# Patient Record
Sex: MALE | Race: WHITE | NOT HISPANIC OR LATINO | Employment: OTHER | URBAN - NONMETROPOLITAN AREA
[De-identification: names, ages, dates, MRNs, and addresses within clinical notes are randomized per-mention and may not be internally consistent; named-entity substitution may affect disease eponyms.]

---

## 2017-01-26 RX ORDER — PRAVASTATIN SODIUM 20 MG
TABLET ORAL
Qty: 30 TABLET | Refills: 5 | Status: SHIPPED | OUTPATIENT
Start: 2017-01-26 | End: 2017-08-22 | Stop reason: SDUPTHER

## 2017-02-23 RX ORDER — AMLODIPINE BESYLATE 5 MG/1
TABLET ORAL
Qty: 30 TABLET | Refills: 6 | Status: SHIPPED | OUTPATIENT
Start: 2017-02-23 | End: 2017-06-06

## 2017-02-23 RX ORDER — LOSARTAN POTASSIUM AND HYDROCHLOROTHIAZIDE 12.5; 1 MG/1; MG/1
TABLET ORAL
Qty: 30 TABLET | Refills: 4 | Status: SHIPPED | OUTPATIENT
Start: 2017-02-23 | End: 2017-07-05

## 2017-03-07 RX ORDER — MONTELUKAST SODIUM 10 MG/1
10 TABLET ORAL NIGHTLY
Qty: 30 TABLET | Refills: 2 | Status: SHIPPED | OUTPATIENT
Start: 2017-03-07 | End: 2017-05-22 | Stop reason: SDUPTHER

## 2017-05-22 ENCOUNTER — OFFICE VISIT (OUTPATIENT)
Dept: FAMILY MEDICINE CLINIC | Facility: CLINIC | Age: 62
End: 2017-05-22

## 2017-05-22 VITALS
RESPIRATION RATE: 16 BRPM | BODY MASS INDEX: 23.69 KG/M2 | OXYGEN SATURATION: 94 % | WEIGHT: 169.2 LBS | SYSTOLIC BLOOD PRESSURE: 136 MMHG | HEIGHT: 71 IN | HEART RATE: 80 BPM | DIASTOLIC BLOOD PRESSURE: 84 MMHG | TEMPERATURE: 98.2 F

## 2017-05-22 DIAGNOSIS — J44.9 CHRONIC OBSTRUCTIVE PULMONARY DISEASE, UNSPECIFIED COPD TYPE (HCC): Primary | ICD-10-CM

## 2017-05-22 DIAGNOSIS — J60 COALWORKER'S PNEUMOCONIOSIS (HCC): ICD-10-CM

## 2017-05-22 DIAGNOSIS — I10 ESSENTIAL HYPERTENSION: ICD-10-CM

## 2017-05-22 DIAGNOSIS — E78.2 MIXED HYPERLIPIDEMIA: ICD-10-CM

## 2017-05-22 DIAGNOSIS — I49.9 IRREGULAR HEART BEATS: ICD-10-CM

## 2017-05-22 DIAGNOSIS — R53.83 FATIGUE, UNSPECIFIED TYPE: ICD-10-CM

## 2017-05-22 PROCEDURE — 99214 OFFICE O/P EST MOD 30 MIN: CPT | Performed by: NURSE PRACTITIONER

## 2017-05-22 PROCEDURE — 93000 ELECTROCARDIOGRAM COMPLETE: CPT | Performed by: NURSE PRACTITIONER

## 2017-05-22 RX ORDER — BUDESONIDE AND FORMOTEROL FUMARATE DIHYDRATE 160; 4.5 UG/1; UG/1
2 AEROSOL RESPIRATORY (INHALATION) 2 TIMES DAILY
Qty: 1 INHALER | Refills: 5 | Status: SHIPPED | OUTPATIENT
Start: 2017-05-22 | End: 2019-11-15 | Stop reason: ALTCHOICE

## 2017-05-22 RX ORDER — MONTELUKAST SODIUM 10 MG/1
10 TABLET ORAL NIGHTLY
COMMUNITY
End: 2017-05-22 | Stop reason: SDUPTHER

## 2017-05-22 RX ORDER — MONTELUKAST SODIUM 10 MG/1
10 TABLET ORAL NIGHTLY
Qty: 30 TABLET | Refills: 5 | Status: SHIPPED | OUTPATIENT
Start: 2017-05-22 | End: 2017-10-24 | Stop reason: SDUPTHER

## 2017-05-22 RX ORDER — ALBUTEROL SULFATE 90 UG/1
1-2 AEROSOL, METERED RESPIRATORY (INHALATION) EVERY 4 HOURS PRN
Qty: 1 INHALER | Refills: 5 | Status: SHIPPED | OUTPATIENT
Start: 2017-05-22 | End: 2017-06-06 | Stop reason: SDUPTHER

## 2017-05-24 ENCOUNTER — LAB (OUTPATIENT)
Dept: LAB | Facility: OTHER | Age: 62
End: 2017-05-24

## 2017-05-24 DIAGNOSIS — E78.2 MIXED HYPERLIPIDEMIA: ICD-10-CM

## 2017-05-24 DIAGNOSIS — I49.9 IRREGULAR HEART BEATS: ICD-10-CM

## 2017-05-24 DIAGNOSIS — R53.83 FATIGUE, UNSPECIFIED TYPE: ICD-10-CM

## 2017-05-24 DIAGNOSIS — I10 ESSENTIAL HYPERTENSION: ICD-10-CM

## 2017-05-24 LAB
ALBUMIN SERPL-MCNC: 4.3 G/DL (ref 3.2–5.5)
ALBUMIN/GLOB SERPL: 1.3 G/DL (ref 1–3)
ALP SERPL-CCNC: 57 U/L (ref 15–121)
ALT SERPL W P-5'-P-CCNC: 32 U/L (ref 10–60)
ANION GAP SERPL CALCULATED.3IONS-SCNC: 11 MMOL/L (ref 5–15)
AST SERPL-CCNC: 33 U/L (ref 10–60)
BASOPHILS # BLD AUTO: 0.03 10*3/MM3 (ref 0–0.2)
BASOPHILS NFR BLD AUTO: 0.5 % (ref 0–2)
BILIRUB SERPL-MCNC: 0.7 MG/DL (ref 0.2–1)
BUN BLD-MCNC: 9 MG/DL (ref 8–25)
BUN/CREAT SERPL: 10 (ref 7–25)
CALCIUM SPEC-SCNC: 9.9 MG/DL (ref 8.4–10.8)
CHLORIDE SERPL-SCNC: 97 MMOL/L (ref 100–112)
CHOLEST SERPL-MCNC: 182 MG/DL (ref 150–200)
CO2 SERPL-SCNC: 29 MMOL/L (ref 20–32)
CREAT BLD-MCNC: 0.9 MG/DL (ref 0.4–1.3)
DEPRECATED RDW RBC AUTO: 45.1 FL (ref 35.1–43.9)
EOSINOPHIL # BLD AUTO: 0.43 10*3/MM3 (ref 0–0.7)
EOSINOPHIL NFR BLD AUTO: 6.5 % (ref 0–7)
ERYTHROCYTE [DISTWIDTH] IN BLOOD BY AUTOMATED COUNT: 13.6 % (ref 11.5–14.5)
GFR SERPL CREATININE-BSD FRML MDRD: 86 ML/MIN/1.73 (ref 49–113)
GLOBULIN UR ELPH-MCNC: 3.3 GM/DL (ref 2.5–4.6)
GLUCOSE BLD-MCNC: 120 MG/DL (ref 70–100)
HCT VFR BLD AUTO: 50.6 % (ref 39–49)
HDLC SERPL-MCNC: 91 MG/DL (ref 35–100)
HGB BLD-MCNC: 17.6 G/DL (ref 13.7–17.3)
LDLC SERPL CALC-MCNC: 82 MG/DL
LDLC/HDLC SERPL: 0.9 {RATIO}
LYMPHOCYTES # BLD AUTO: 1.42 10*3/MM3 (ref 0.6–4.2)
LYMPHOCYTES NFR BLD AUTO: 21.4 % (ref 10–50)
MAGNESIUM SERPL-MCNC: 2.1 MG/DL (ref 1.8–2.5)
MCH RBC QN AUTO: 32.5 PG (ref 26.5–34)
MCHC RBC AUTO-ENTMCNC: 34.8 G/DL (ref 31.5–36.3)
MCV RBC AUTO: 93.4 FL (ref 80–98)
MONOCYTES # BLD AUTO: 0.99 10*3/MM3 (ref 0–0.9)
MONOCYTES NFR BLD AUTO: 14.9 % (ref 0–12)
NEUTROPHILS # BLD AUTO: 3.78 10*3/MM3 (ref 2–8.6)
NEUTROPHILS NFR BLD AUTO: 56.7 % (ref 37–80)
PLATELET # BLD AUTO: 343 10*3/MM3 (ref 150–450)
PMV BLD AUTO: 9.6 FL (ref 8–12)
POTASSIUM BLD-SCNC: 5.6 MMOL/L (ref 3.4–5.4)
PROT SERPL-MCNC: 7.6 G/DL (ref 6.7–8.2)
RBC # BLD AUTO: 5.42 10*6/MM3 (ref 4.37–5.74)
SODIUM BLD-SCNC: 137 MMOL/L (ref 134–146)
TRIGL SERPL-MCNC: 47 MG/DL (ref 35–160)
VLDLC SERPL-MCNC: 9.4 MG/DL
WBC NRBC COR # BLD: 6.65 10*3/MM3 (ref 3.2–9.8)

## 2017-05-24 PROCEDURE — 84443 ASSAY THYROID STIM HORMONE: CPT | Performed by: NURSE PRACTITIONER

## 2017-05-24 PROCEDURE — 85025 COMPLETE CBC W/AUTO DIFF WBC: CPT | Performed by: NURSE PRACTITIONER

## 2017-05-24 PROCEDURE — 36415 COLL VENOUS BLD VENIPUNCTURE: CPT | Performed by: NURSE PRACTITIONER

## 2017-05-24 PROCEDURE — 80061 LIPID PANEL: CPT | Performed by: NURSE PRACTITIONER

## 2017-05-24 PROCEDURE — 80053 COMPREHEN METABOLIC PANEL: CPT | Performed by: NURSE PRACTITIONER

## 2017-05-24 PROCEDURE — 83735 ASSAY OF MAGNESIUM: CPT | Performed by: NURSE PRACTITIONER

## 2017-05-24 PROCEDURE — 84439 ASSAY OF FREE THYROXINE: CPT | Performed by: NURSE PRACTITIONER

## 2017-05-25 LAB
T4 FREE SERPL-MCNC: 1.54 NG/DL (ref 0.78–2.19)
TSH SERPL DL<=0.05 MIU/L-ACNC: 0.57 MIU/ML (ref 0.46–4.68)

## 2017-05-26 DIAGNOSIS — E87.5 HYPERKALEMIA: Primary | ICD-10-CM

## 2017-06-02 LAB
ANION GAP SERPL CALCULATED.3IONS-SCNC: 12 MMOL/L (ref 5–15)
BASOPHILS # BLD AUTO: 0.03 10*3/MM3 (ref 0–0.2)
BASOPHILS NFR BLD AUTO: 0.4 % (ref 0–2)
BUN BLD-MCNC: 10 MG/DL (ref 8–25)
BUN/CREAT SERPL: 11.1 (ref 7–25)
CALCIUM SPEC-SCNC: 9.2 MG/DL (ref 8.4–10.8)
CHLORIDE SERPL-SCNC: 98 MMOL/L (ref 100–112)
CO2 SERPL-SCNC: 26 MMOL/L (ref 20–32)
CREAT BLD-MCNC: 0.9 MG/DL (ref 0.4–1.3)
DEPRECATED RDW RBC AUTO: 45.2 FL (ref 35.1–43.9)
EOSINOPHIL # BLD AUTO: 0.4 10*3/MM3 (ref 0–0.7)
EOSINOPHIL NFR BLD AUTO: 5.3 % (ref 0–7)
ERYTHROCYTE [DISTWIDTH] IN BLOOD BY AUTOMATED COUNT: 13.6 % (ref 11.5–14.5)
GFR SERPL CREATININE-BSD FRML MDRD: 86 ML/MIN/1.73 (ref 49–113)
GLUCOSE BLD-MCNC: 121 MG/DL (ref 70–100)
HCT VFR BLD AUTO: 49.8 % (ref 39–49)
HGB BLD-MCNC: 17.3 G/DL (ref 13.7–17.3)
LYMPHOCYTES # BLD AUTO: 1.16 10*3/MM3 (ref 0.6–4.2)
LYMPHOCYTES NFR BLD AUTO: 15.3 % (ref 10–50)
MCH RBC QN AUTO: 32.3 PG (ref 26.5–34)
MCHC RBC AUTO-ENTMCNC: 34.7 G/DL (ref 31.5–36.3)
MCV RBC AUTO: 92.9 FL (ref 80–98)
MONOCYTES # BLD AUTO: 0.75 10*3/MM3 (ref 0–0.9)
MONOCYTES NFR BLD AUTO: 9.9 % (ref 0–12)
NEUTROPHILS # BLD AUTO: 5.24 10*3/MM3 (ref 2–8.6)
NEUTROPHILS NFR BLD AUTO: 69.1 % (ref 37–80)
PLATELET # BLD AUTO: 300 10*3/MM3 (ref 150–450)
PMV BLD AUTO: 9.3 FL (ref 8–12)
POTASSIUM BLD-SCNC: 4.4 MMOL/L (ref 3.4–5.4)
RBC # BLD AUTO: 5.36 10*6/MM3 (ref 4.37–5.74)
SODIUM BLD-SCNC: 136 MMOL/L (ref 134–146)
WBC NRBC COR # BLD: 7.58 10*3/MM3 (ref 3.2–9.8)

## 2017-06-02 PROCEDURE — 85025 COMPLETE CBC W/AUTO DIFF WBC: CPT | Performed by: NURSE PRACTITIONER

## 2017-06-02 PROCEDURE — 36415 COLL VENOUS BLD VENIPUNCTURE: CPT | Performed by: NURSE PRACTITIONER

## 2017-06-02 PROCEDURE — 80048 BASIC METABOLIC PNL TOTAL CA: CPT | Performed by: NURSE PRACTITIONER

## 2017-06-06 ENCOUNTER — OFFICE VISIT (OUTPATIENT)
Dept: FAMILY MEDICINE CLINIC | Facility: CLINIC | Age: 62
End: 2017-06-06

## 2017-06-06 VITALS
WEIGHT: 168 LBS | HEIGHT: 69 IN | DIASTOLIC BLOOD PRESSURE: 82 MMHG | BODY MASS INDEX: 24.88 KG/M2 | TEMPERATURE: 98.8 F | HEART RATE: 76 BPM | SYSTOLIC BLOOD PRESSURE: 152 MMHG

## 2017-06-06 DIAGNOSIS — D75.1 POLYCYTHEMIA: ICD-10-CM

## 2017-06-06 DIAGNOSIS — J44.9 CHRONIC OBSTRUCTIVE PULMONARY DISEASE, UNSPECIFIED COPD TYPE (HCC): ICD-10-CM

## 2017-06-06 DIAGNOSIS — E87.5 HYPERKALEMIA: ICD-10-CM

## 2017-06-06 DIAGNOSIS — I10 ESSENTIAL HYPERTENSION: Primary | ICD-10-CM

## 2017-06-06 PROCEDURE — 99214 OFFICE O/P EST MOD 30 MIN: CPT | Performed by: NURSE PRACTITIONER

## 2017-06-06 RX ORDER — AMLODIPINE BESYLATE 10 MG/1
10 TABLET ORAL DAILY
Qty: 30 TABLET | Refills: 5 | Status: SHIPPED | OUTPATIENT
Start: 2017-06-06 | End: 2017-07-05 | Stop reason: SDUPTHER

## 2017-06-06 RX ORDER — ALBUTEROL SULFATE 90 UG/1
1-2 AEROSOL, METERED RESPIRATORY (INHALATION) EVERY 4 HOURS PRN
Qty: 1 INHALER | Refills: 5 | Status: SHIPPED | OUTPATIENT
Start: 2017-06-06 | End: 2017-06-12 | Stop reason: CLARIF

## 2017-06-06 RX ORDER — ASPIRIN 81 MG/1
81 TABLET, CHEWABLE ORAL DAILY
COMMUNITY

## 2017-06-06 NOTE — PROGRESS NOTES
Subjective   Derrell Bynum is a 62 y.o. male.     Chief Complaint   Patient presents with   • Follow-up     lab results       History of Present Illness     Here to f/u on elevated potassium noted on recent labs.  His potassium had returned to normal on recheck on June 2 after stopping the losartan. His blood pressure is higher today due to not taking the medication.  He has polycythemia, that is stable, but was probably being aggravated by the HCTZ combined with the losartan. He has been noticing hand cramping, but this has improved since stopping the medication.    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  Proventil inhaler for COPD was not affordable.  He requests ventolin.  He continues to smoke and is not ready for smoking cessation despite encouragement.      The following portions of the patient's history were reviewed and updated as appropriate: allergies, past family history, past medical history, past social history, past surgical history and problem list.  Current Outpatient Prescriptions on File Prior to Visit   Medication Sig Dispense Refill   • albuterol (PROVENTIL) (2.5 MG/3ML) 0.083% nebulizer solution Take 2.5 mg by nebulization Every 4 (Four) Hours As Needed for wheezing (Neb tx q6hrs prn cough, congestion, wheezing).     • budesonide-formoterol (SYMBICORT) 160-4.5 MCG/ACT inhaler Inhale 2 puffs 2 (Two) Times a Day. 1 inhaler 5   • fluticasone (FLONASE) 50 MCG/ACT nasal spray 1 spray into each nostril 2 (Two) Times a Day.     • loratadine (CLARITIN) 10 MG tablet Take 10 mg by mouth Daily.     • montelukast (SINGULAIR) 10 MG tablet Take 1 tablet by mouth Every Night. 30 tablet 5   • pravastatin (PRAVACHOL) 20 MG tablet 1 TABLET(S) BY MOUTH AT BEDTIME 30 tablet 5   • [DISCONTINUED] albuterol (PROVENTIL HFA;VENTOLIN HFA) 108 (90 BASE) MCG/ACT inhaler Inhale 1-2 puffs Every 4 (Four) Hours As Needed for Wheezing or Shortness of Air. 1 inhaler 5  "  • [DISCONTINUED] amLODIPine (NORVASC) 5 MG tablet TAKE ONE TABLET BY MOUTH DAILY WITH LISINOPRIL FOR BLOOD PRESSURE 30 tablet 6   • losartan-hydrochlorothiazide (HYZAAR) 100-12.5 MG per tablet TAKE 1 TABLET BY MOUTH EVERY DAY 30 tablet 4   • tiotropium (SPIRIVA) 18 MCG per inhalation capsule Place 1 capsule into inhaler and inhale Daily.       No current facility-administered medications on file prior to visit.      Review of Systems   Constitutional: Negative for activity change, chills, fatigue and fever.   HENT: Negative for congestion, rhinorrhea, sinus pressure and sore throat.    Eyes: Negative for pain, discharge, itching and visual disturbance.   Respiratory: Positive for cough, shortness of breath and wheezing.         Chronic cough from COPD   Cardiovascular: Negative for chest pain, palpitations and leg swelling.   Gastrointestinal: Negative for abdominal pain, blood in stool, constipation, diarrhea, nausea and vomiting.   Endocrine: Negative for polydipsia and polyuria.   Genitourinary: Negative for dysuria, flank pain, frequency, hematuria and urgency.   Musculoskeletal: Positive for arthralgias. Negative for back pain, gait problem and myalgias.        Chronic arthritis   Skin: Negative for rash.   Neurological: Negative for dizziness, tremors, seizures, syncope, weakness and headaches.   Hematological: Negative for adenopathy. Does not bruise/bleed easily.   Psychiatric/Behavioral: Negative for confusion, dysphoric mood, sleep disturbance and suicidal ideas. The patient is not nervous/anxious.        Objective    Vitals:    06/06/17 0818   BP: 152/82   Pulse: 76   Temp: 98.8 °F (37.1 °C)   Weight: 168 lb (76.2 kg)   Height: 69\" (175.3 cm)   PainSc: 0-No pain     Physical Exam   Constitutional: He is oriented to person, place, and time. He appears well-developed and well-nourished. No distress.   HENT:   Head: Normocephalic.   Right Ear: External ear normal.   Left Ear: External ear normal.   Nose: " Nose normal.   Mouth/Throat: Oropharynx is clear and moist.   TMs normal.    Eyes: Conjunctivae are normal. Pupils are equal, round, and reactive to light. Right eye exhibits no discharge. Left eye exhibits no discharge. No scleral icterus.   Neck: Neck supple. No thyromegaly present.   No cervical, occipital or supraclavicular adenopathy.    Cardiovascular: Normal rate and normal heart sounds.    No murmur heard.  HR is irregular.   No dependent edema.    Pulmonary/Chest: Effort normal. No respiratory distress. He has wheezes. He has no rales.   Diminished breath sounds to bases as usual for this patient.    Genitourinary:   Genitourinary Comments: No CVAT.    Musculoskeletal: He exhibits no edema or deformity.   Lymphadenopathy:     He has no cervical adenopathy.   Neurological: He is alert and oriented to person, place, and time. No cranial nerve deficit. Coordination normal.   No balance disturbance observed.    Skin: Skin is warm and dry. No rash noted. He is not diaphoretic. No pallor.   Psychiatric: He has a normal mood and affect. His behavior is normal.   Nursing note and vitals reviewed.      Assessment/Plan   Problems Addressed this Visit        Cardiovascular and Mediastinum    Essential hypertension - Primary    Relevant Medications    amLODIPine (NORVASC) 10 MG tablet       Respiratory    COPD (chronic obstructive pulmonary disease)    Relevant Medications    Fluticasone Furoate-Vilanterol (BREO ELLIPTA) 100-25 MCG/INH aerosol powder     albuterol (PROVENTIL HFA;VENTOLIN HFA) 108 (90 BASE) MCG/ACT inhaler       Hematopoietic and Hemostatic    Polycythemia       Other    Hyperkalemia          Continue amlodipine and other medications.  Will increase the amlodipine to 10 mg daily.  Counseled potential medication side effects and benefits. Instructed to report side effects. Report if symptoms worsen or persist. Understanding expressed.   follow-up 1 month and sooner if needed.

## 2017-07-05 ENCOUNTER — OFFICE VISIT (OUTPATIENT)
Dept: FAMILY MEDICINE CLINIC | Facility: CLINIC | Age: 62
End: 2017-07-05

## 2017-07-05 VITALS
SYSTOLIC BLOOD PRESSURE: 152 MMHG | HEIGHT: 69 IN | WEIGHT: 167 LBS | HEART RATE: 80 BPM | DIASTOLIC BLOOD PRESSURE: 88 MMHG | BODY MASS INDEX: 24.73 KG/M2

## 2017-07-05 DIAGNOSIS — I10 ESSENTIAL HYPERTENSION: Primary | ICD-10-CM

## 2017-07-05 DIAGNOSIS — J44.9 CHRONIC OBSTRUCTIVE PULMONARY DISEASE, UNSPECIFIED COPD TYPE (HCC): ICD-10-CM

## 2017-07-05 DIAGNOSIS — G89.29 CHRONIC LEFT-SIDED LOW BACK PAIN WITHOUT SCIATICA: ICD-10-CM

## 2017-07-05 DIAGNOSIS — M54.50 CHRONIC LEFT-SIDED LOW BACK PAIN WITHOUT SCIATICA: ICD-10-CM

## 2017-07-05 PROCEDURE — 99214 OFFICE O/P EST MOD 30 MIN: CPT | Performed by: NURSE PRACTITIONER

## 2017-07-05 RX ORDER — AMLODIPINE BESYLATE 10 MG/1
10 TABLET ORAL DAILY
Qty: 30 TABLET | Refills: 5 | Status: SHIPPED | OUTPATIENT
Start: 2017-07-05 | End: 2017-10-24 | Stop reason: SDUPTHER

## 2017-07-05 RX ORDER — CLONIDINE HYDROCHLORIDE 0.1 MG/1
0.1 TABLET ORAL 2 TIMES DAILY
Qty: 60 TABLET | Refills: 5 | Status: SHIPPED | OUTPATIENT
Start: 2017-07-05 | End: 2018-11-29 | Stop reason: SDUPTHER

## 2017-07-05 RX ORDER — HYDROCODONE BITARTRATE AND ACETAMINOPHEN 7.5; 325 MG/1; MG/1
TABLET ORAL
Qty: 60 TABLET | Refills: 0 | Status: SHIPPED | OUTPATIENT
Start: 2017-07-05 | End: 2017-07-27 | Stop reason: SDUPTHER

## 2017-07-05 NOTE — PROGRESS NOTES
Subjective   Derrell Bynum is a 62 y.o. male.     Chief Complaint   Patient presents with   • Follow-up       History of Present Illness     Here to f/u on HTN after increasing amlodpine.  He had elevated potasisum with losartan but this returned to normal.   His B/P still runs high sometimes. Upon recheck today it is 150/82. He is having a lot of low back pain.     He has polycythemia, that is stable. This was likely aggravated by the HCTZ and his smoking.      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  Symptoms improved  with his current inhalers but he does have daily dyspnea with activity. He continues to smoke and is not ready for smoking cessation despite encouragement.      The following portions of the patient's history were reviewed and updated as appropriate: allergies, past family history, past medical history, past social history, past surgical history and problem list.  Current Outpatient Prescriptions on File Prior to Visit   Medication Sig Dispense Refill   • albuterol (PROAIR RESPICLICK) 108 (90 BASE) MCG/ACT inhaler Inhale 2 puffs Every 4 (Four) Hours As Needed for Wheezing. 1 inhaler 11   • albuterol (PROVENTIL) (2.5 MG/3ML) 0.083% nebulizer solution Take 2.5 mg by nebulization Every 4 (Four) Hours As Needed for wheezing (Neb tx q6hrs prn cough, congestion, wheezing).     • aspirin 81 MG chewable tablet Chew 81 mg Daily.     • fluticasone (FLONASE) 50 MCG/ACT nasal spray 1 spray into each nostril 2 (Two) Times a Day.     • Fluticasone Furoate-Vilanterol (BREO ELLIPTA) 100-25 MCG/INH aerosol powder  Inhale 1 puff Daily.     • loratadine (CLARITIN) 10 MG tablet Take 10 mg by mouth Daily.     • montelukast (SINGULAIR) 10 MG tablet Take 1 tablet by mouth Every Night. 30 tablet 5   • pravastatin (PRAVACHOL) 20 MG tablet 1 TABLET(S) BY MOUTH AT BEDTIME 30 tablet 5   • [DISCONTINUED] amLODIPine (NORVASC) 10 MG tablet Take 1 tablet by mouth Daily. 30  "tablet 5   • budesonide-formoterol (SYMBICORT) 160-4.5 MCG/ACT inhaler Inhale 2 puffs 2 (Two) Times a Day. 1 inhaler 5   • tiotropium (SPIRIVA) 18 MCG per inhalation capsule Place 1 capsule into inhaler and inhale Daily.     • [DISCONTINUED] losartan-hydrochlorothiazide (HYZAAR) 100-12.5 MG per tablet TAKE 1 TABLET BY MOUTH EVERY DAY 30 tablet 4     No current facility-administered medications on file prior to visit.      Review of Systems   Constitutional: Negative for activity change, chills, fatigue and fever.   HENT: Negative for congestion, rhinorrhea, sinus pressure and sore throat.    Eyes: Negative for pain, discharge, itching and visual disturbance.   Respiratory: Positive for cough, shortness of breath and wheezing.         Chronic cough from COPD   Cardiovascular: Negative for chest pain, palpitations and leg swelling.   Gastrointestinal: Negative for abdominal pain, blood in stool, constipation, diarrhea, nausea and vomiting.   Endocrine: Negative for polydipsia and polyuria.   Genitourinary: Negative for dysuria, flank pain, frequency, hematuria and urgency.   Musculoskeletal: Positive for arthralgias and back pain. Negative for gait problem and myalgias.        Chronic arthritis   Skin: Negative for rash.   Neurological: Negative for dizziness, tremors, seizures, syncope, weakness and headaches.   Hematological: Negative for adenopathy. Does not bruise/bleed easily.   Psychiatric/Behavioral: Negative for confusion, dysphoric mood, sleep disturbance and suicidal ideas. The patient is not nervous/anxious.        Objective    Vitals:    07/05/17 0809   BP: 152/88   Pulse: 80   Weight: 167 lb (75.8 kg)   Height: 69\" (175.3 cm)   PainSc: 10-Worst pain ever   PainLoc: Back     Physical Exam   Constitutional: He is oriented to person, place, and time. He appears well-developed and well-nourished. No distress.   HENT:   Head: Normocephalic.   Right Ear: External ear normal.   Left Ear: External ear normal. "   Nose: Nose normal.   Mouth/Throat: Oropharynx is clear and moist.   TMs normal.    Eyes: Conjunctivae are normal. Pupils are equal, round, and reactive to light. Right eye exhibits no discharge. Left eye exhibits no discharge. No scleral icterus.   Neck: Neck supple. No thyromegaly present.   No cervical, occipital or supraclavicular adenopathy.    Cardiovascular: Normal rate and normal heart sounds.    No murmur heard.  No dependent edema.    Pulmonary/Chest: Effort normal. No respiratory distress. He has wheezes. He has no rales.   Diminished breath sounds to bases as usual for this patient.    Genitourinary:   Genitourinary Comments: No CVAT.    Musculoskeletal: He exhibits tenderness. He exhibits no edema or deformity.   Mild kyphosis and lumbar tenderness.    Lymphadenopathy:     He has no cervical adenopathy.   Neurological: He is alert and oriented to person, place, and time. No cranial nerve deficit. Coordination normal.   No balance disturbance observed.    Skin: Skin is warm and dry. No rash noted. He is not diaphoretic. No pallor.   Psychiatric: He has a normal mood and affect. His behavior is normal.   Nursing note and vitals reviewed.      Assessment/Plan   Problems Addressed this Visit        Cardiovascular and Mediastinum    Essential hypertension    Relevant Medications    amLODIPine (NORVASC) 10 MG tablet    CloNIDine (CATAPRES) 0.1 MG tablet       Respiratory    COPD (chronic obstructive pulmonary disease)      Other Visit Diagnoses     Chronic left-sided low back pain without sciatica    -  Primary    Relevant Medications    HYDROcodone-acetaminophen (LORTAB) 7.5-325 MG per tablet    Other Relevant Orders    XR Spine Lumbar AP & Lateral          Continue current medications.  We will add Lortab when necessary for pain and he will take this only when needed.  Counseled potential medication side effects and benefits. Instructed to report side effects. Report if symptoms worsen or persist.  Understanding expressed.   follow-up 1 month and sooner if needed.

## 2017-07-27 ENCOUNTER — OFFICE VISIT (OUTPATIENT)
Dept: FAMILY MEDICINE CLINIC | Facility: CLINIC | Age: 62
End: 2017-07-27

## 2017-07-27 VITALS
DIASTOLIC BLOOD PRESSURE: 78 MMHG | BODY MASS INDEX: 25.03 KG/M2 | SYSTOLIC BLOOD PRESSURE: 114 MMHG | WEIGHT: 169 LBS | HEIGHT: 69 IN | HEART RATE: 78 BPM

## 2017-07-27 DIAGNOSIS — J44.9 CHRONIC OBSTRUCTIVE PULMONARY DISEASE, UNSPECIFIED COPD TYPE (HCC): Primary | ICD-10-CM

## 2017-07-27 DIAGNOSIS — I10 ESSENTIAL HYPERTENSION: ICD-10-CM

## 2017-07-27 DIAGNOSIS — M54.50 CHRONIC LEFT-SIDED LOW BACK PAIN WITHOUT SCIATICA: ICD-10-CM

## 2017-07-27 DIAGNOSIS — J60 COALWORKER'S PNEUMOCONIOSIS (HCC): ICD-10-CM

## 2017-07-27 DIAGNOSIS — G89.29 CHRONIC LEFT-SIDED LOW BACK PAIN WITHOUT SCIATICA: ICD-10-CM

## 2017-07-27 PROCEDURE — 99214 OFFICE O/P EST MOD 30 MIN: CPT | Performed by: NURSE PRACTITIONER

## 2017-07-27 RX ORDER — HYDROCODONE BITARTRATE AND ACETAMINOPHEN 7.5; 325 MG/1; MG/1
TABLET ORAL
Qty: 60 TABLET | Refills: 0 | Status: SHIPPED | OUTPATIENT
Start: 2017-07-27 | End: 2017-08-25 | Stop reason: SDUPTHER

## 2017-07-27 NOTE — PROGRESS NOTES
Subjective   Derrell Bynum is a 62 y.o. male.     Chief Complaint   Patient presents with   • Follow-up     x rays/back       History of Present Illness     Here to f/u on chronic back pain and arthritis. Lumbar x-ray last month revealed OA and DDD.   Lortab has helped his pain and his B/P imporoved with the pain relief and recent addition of amlodipine.  He denies medication side effects.    He has COPD and pneumoconiosis, which as been present for several years.  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  Symptoms have improved with his current inhalers but he does have daily dyspnea with activity.  He continues to smoke and is not ready for smoking cessation despite encouragement.  He uses home oxygen, especially at night but states he may have to let this go back to the DME company due to insurance not covering well.    The following portions of the patient's history were reviewed and updated as appropriate: allergies, past family history, past medical history, past social history, past surgical history and problem list.  Current Outpatient Prescriptions on File Prior to Visit   Medication Sig Dispense Refill   • albuterol (PROAIR RESPICLICK) 108 (90 BASE) MCG/ACT inhaler Inhale 2 puffs Every 4 (Four) Hours As Needed for Wheezing. 1 inhaler 11   • albuterol (PROVENTIL) (2.5 MG/3ML) 0.083% nebulizer solution Take 2.5 mg by nebulization Every 4 (Four) Hours As Needed for wheezing (Neb tx q6hrs prn cough, congestion, wheezing).     • amLODIPine (NORVASC) 10 MG tablet Take 1 tablet by mouth Daily. 30 tablet 5   • aspirin 81 MG chewable tablet Chew 81 mg Daily.     • budesonide-formoterol (SYMBICORT) 160-4.5 MCG/ACT inhaler Inhale 2 puffs 2 (Two) Times a Day. 1 inhaler 5   • CloNIDine (CATAPRES) 0.1 MG tablet Take 1 tablet by mouth 2 (Two) Times a Day. If needed for B/P 150/90 or higher. 60 tablet 5   • Fluticasone Furoate-Vilanterol (BREO ELLIPTA) 100-25 MCG/INH aerosol powder  Inhale 1 puff Daily.    "  • loratadine (CLARITIN) 10 MG tablet Take 10 mg by mouth Daily.     • montelukast (SINGULAIR) 10 MG tablet Take 1 tablet by mouth Every Night. 30 tablet 5   • pravastatin (PRAVACHOL) 20 MG tablet 1 TABLET(S) BY MOUTH AT BEDTIME 30 tablet 5   • tiotropium (SPIRIVA) 18 MCG per inhalation capsule Place 1 capsule into inhaler and inhale Daily.     • fluticasone (FLONASE) 50 MCG/ACT nasal spray 1 spray into each nostril 2 (Two) Times a Day.       No current facility-administered medications on file prior to visit.      Review of Systems   Constitutional: Negative for activity change, chills, fatigue and fever.   HENT: Negative for congestion, rhinorrhea, sinus pressure and sore throat.    Eyes: Negative for pain, discharge, itching and visual disturbance.   Respiratory: Positive for cough, shortness of breath and wheezing.         Chronic cough from COPD   Cardiovascular: Negative for chest pain, palpitations and leg swelling.   Gastrointestinal: Negative for abdominal pain, blood in stool, constipation, diarrhea, nausea and vomiting.   Endocrine: Negative for polydipsia and polyuria.   Genitourinary: Negative for dysuria, flank pain, frequency, hematuria and urgency.   Musculoskeletal: Positive for arthralgias and back pain. Negative for gait problem and myalgias.        Chronic arthritis   Skin: Negative for rash.   Neurological: Negative for dizziness, tremors, seizures, syncope, weakness and headaches.   Hematological: Negative for adenopathy. Does not bruise/bleed easily.   Psychiatric/Behavioral: Negative for confusion, dysphoric mood, sleep disturbance and suicidal ideas. The patient is not nervous/anxious.        Objective    Vitals:    07/27/17 0849   BP: 114/78   Pulse: 78   Weight: 169 lb (76.7 kg)   Height: 69\" (175.3 cm)   PainSc: 0-No pain     Physical Exam   Constitutional: He is oriented to person, place, and time. He appears well-developed and well-nourished. No distress.   HENT:   Head: Normocephalic. "   Right Ear: External ear normal.   Left Ear: External ear normal.   Nose: Nose normal.   Mouth/Throat: Oropharynx is clear and moist.   TMs normal.    Eyes: Conjunctivae are normal. Pupils are equal, round, and reactive to light. Right eye exhibits no discharge. Left eye exhibits no discharge. No scleral icterus.   Neck: Neck supple. No thyromegaly present.   No cervical, occipital or supraclavicular adenopathy.    Cardiovascular: Normal rate and normal heart sounds.    No murmur heard.  No dependent edema.    Pulmonary/Chest: Effort normal. No respiratory distress. He has wheezes. He has no rales.   Diminished breath sounds to bases as usual for this patient.    Genitourinary:   Genitourinary Comments: No CVAT.    Musculoskeletal: He exhibits tenderness. He exhibits no edema or deformity.   Mild kyphosis and lumbar tenderness.    Lymphadenopathy:     He has no cervical adenopathy.   Neurological: He is alert and oriented to person, place, and time. No cranial nerve deficit. Coordination normal.   No balance disturbance observed.    Skin: Skin is warm and dry. No rash noted. He is not diaphoretic. No pallor.   Psychiatric: He has a normal mood and affect. His behavior is normal.   Nursing note and vitals reviewed.    9++  Assessment/Plan   Problems Addressed this Visit        Cardiovascular and Mediastinum    Essential hypertension       Respiratory    Coalworker's pneumoconiosis    COPD (chronic obstructive pulmonary disease) - Primary      Other Visit Diagnoses     Chronic left-sided low back pain without sciatica        Relevant Medications    HYDROcodone-acetaminophen (LORTAB) 7.5-325 MG per tablet          Continue current medications.  Encouraged him to continue the home oxygen at all possible .  He has filed for some scattered disability due to his lungs and I support this decision.  Counseled potential medication side effects and benefits. Instructed to report side effects. Report if symptoms worsen or  persist. Understanding expressed.     follow-up 3 months and sooner if needed. He can call for refills in between visit.

## 2017-08-04 RX ORDER — MONTELUKAST SODIUM 10 MG/1
TABLET ORAL
Qty: 30 TABLET | Refills: 2 | Status: SHIPPED | OUTPATIENT
Start: 2017-08-04 | End: 2017-10-24 | Stop reason: SDUPTHER

## 2017-08-22 RX ORDER — PRAVASTATIN SODIUM 20 MG
TABLET ORAL
Qty: 30 TABLET | Refills: 6 | Status: SHIPPED | OUTPATIENT
Start: 2017-08-22 | End: 2018-04-02 | Stop reason: SDUPTHER

## 2017-08-25 DIAGNOSIS — M54.50 CHRONIC LEFT-SIDED LOW BACK PAIN WITHOUT SCIATICA: ICD-10-CM

## 2017-08-25 DIAGNOSIS — G89.29 CHRONIC LEFT-SIDED LOW BACK PAIN WITHOUT SCIATICA: ICD-10-CM

## 2017-08-25 RX ORDER — HYDROCODONE BITARTRATE AND ACETAMINOPHEN 7.5; 325 MG/1; MG/1
TABLET ORAL
Qty: 60 TABLET | Refills: 0 | Status: SHIPPED | OUTPATIENT
Start: 2017-08-25 | End: 2017-09-26 | Stop reason: SDUPTHER

## 2017-09-26 DIAGNOSIS — M54.50 CHRONIC LEFT-SIDED LOW BACK PAIN WITHOUT SCIATICA: ICD-10-CM

## 2017-09-26 DIAGNOSIS — G89.29 CHRONIC LEFT-SIDED LOW BACK PAIN WITHOUT SCIATICA: ICD-10-CM

## 2017-09-26 RX ORDER — HYDROCODONE BITARTRATE AND ACETAMINOPHEN 7.5; 325 MG/1; MG/1
TABLET ORAL
Qty: 60 TABLET | Refills: 0 | Status: SHIPPED | OUTPATIENT
Start: 2017-09-26 | End: 2017-10-24 | Stop reason: SDUPTHER

## 2017-10-24 ENCOUNTER — OFFICE VISIT (OUTPATIENT)
Dept: FAMILY MEDICINE CLINIC | Facility: CLINIC | Age: 62
End: 2017-10-24

## 2017-10-24 VITALS
BODY MASS INDEX: 24.14 KG/M2 | HEART RATE: 73 BPM | WEIGHT: 163 LBS | HEIGHT: 69 IN | OXYGEN SATURATION: 91 % | DIASTOLIC BLOOD PRESSURE: 82 MMHG | SYSTOLIC BLOOD PRESSURE: 160 MMHG | RESPIRATION RATE: 16 BRPM

## 2017-10-24 DIAGNOSIS — I10 ESSENTIAL HYPERTENSION: ICD-10-CM

## 2017-10-24 DIAGNOSIS — G89.29 CHRONIC LEFT-SIDED LOW BACK PAIN WITHOUT SCIATICA: ICD-10-CM

## 2017-10-24 DIAGNOSIS — J44.9 CHRONIC OBSTRUCTIVE PULMONARY DISEASE, UNSPECIFIED COPD TYPE (HCC): ICD-10-CM

## 2017-10-24 DIAGNOSIS — M54.50 CHRONIC LEFT-SIDED LOW BACK PAIN WITHOUT SCIATICA: ICD-10-CM

## 2017-10-24 PROCEDURE — 99214 OFFICE O/P EST MOD 30 MIN: CPT | Performed by: NURSE PRACTITIONER

## 2017-10-24 RX ORDER — AMLODIPINE BESYLATE 10 MG/1
10 TABLET ORAL DAILY
Qty: 30 TABLET | Refills: 5 | Status: SHIPPED | OUTPATIENT
Start: 2017-10-24 | End: 2018-04-02 | Stop reason: SDUPTHER

## 2017-10-24 RX ORDER — AZITHROMYCIN 250 MG/1
TABLET, FILM COATED ORAL
Qty: 6 TABLET | Refills: 0 | Status: SHIPPED | OUTPATIENT
Start: 2017-10-24 | End: 2018-01-22

## 2017-10-24 RX ORDER — MONTELUKAST SODIUM 10 MG/1
10 TABLET ORAL NIGHTLY
Qty: 30 TABLET | Refills: 5 | Status: SHIPPED | OUTPATIENT
Start: 2017-10-24 | End: 2018-01-22 | Stop reason: SDUPTHER

## 2017-10-24 RX ORDER — HYDROCODONE BITARTRATE AND ACETAMINOPHEN 7.5; 325 MG/1; MG/1
TABLET ORAL
Qty: 60 TABLET | Refills: 0 | Status: SHIPPED | OUTPATIENT
Start: 2017-10-24 | End: 2017-11-30 | Stop reason: SDUPTHER

## 2017-10-24 RX ORDER — PREDNISONE 10 MG/1
TABLET ORAL
Qty: 21 TABLET | Refills: 0 | Status: SHIPPED | OUTPATIENT
Start: 2017-10-24 | End: 2018-01-22

## 2017-10-24 RX ORDER — MONTELUKAST SODIUM 10 MG/1
10 TABLET ORAL NIGHTLY
Qty: 30 TABLET | Refills: 5 | Status: SHIPPED | OUTPATIENT
Start: 2017-10-24 | End: 2018-04-02 | Stop reason: SDUPTHER

## 2017-11-30 DIAGNOSIS — M54.50 CHRONIC LEFT-SIDED LOW BACK PAIN WITHOUT SCIATICA: ICD-10-CM

## 2017-11-30 DIAGNOSIS — G89.29 CHRONIC LEFT-SIDED LOW BACK PAIN WITHOUT SCIATICA: ICD-10-CM

## 2017-11-30 RX ORDER — HYDROCODONE BITARTRATE AND ACETAMINOPHEN 7.5; 325 MG/1; MG/1
TABLET ORAL
Qty: 60 TABLET | Refills: 0 | Status: SHIPPED | OUTPATIENT
Start: 2017-11-30 | End: 2017-12-28 | Stop reason: SDUPTHER

## 2017-12-28 DIAGNOSIS — G89.29 CHRONIC LEFT-SIDED LOW BACK PAIN WITHOUT SCIATICA: ICD-10-CM

## 2017-12-28 DIAGNOSIS — M54.50 CHRONIC LEFT-SIDED LOW BACK PAIN WITHOUT SCIATICA: ICD-10-CM

## 2017-12-28 RX ORDER — HYDROCODONE BITARTRATE AND ACETAMINOPHEN 7.5; 325 MG/1; MG/1
TABLET ORAL
Qty: 60 TABLET | Refills: 0 | Status: SHIPPED | OUTPATIENT
Start: 2017-12-28 | End: 2018-01-22 | Stop reason: SDUPTHER

## 2018-01-22 ENCOUNTER — OFFICE VISIT (OUTPATIENT)
Dept: FAMILY MEDICINE CLINIC | Facility: CLINIC | Age: 63
End: 2018-01-22

## 2018-01-22 VITALS
HEART RATE: 82 BPM | TEMPERATURE: 98.5 F | SYSTOLIC BLOOD PRESSURE: 144 MMHG | HEIGHT: 69 IN | WEIGHT: 164 LBS | DIASTOLIC BLOOD PRESSURE: 92 MMHG | BODY MASS INDEX: 24.29 KG/M2

## 2018-01-22 DIAGNOSIS — Z12.5 PROSTATE CANCER SCREENING: ICD-10-CM

## 2018-01-22 DIAGNOSIS — J44.9 CHRONIC OBSTRUCTIVE PULMONARY DISEASE, UNSPECIFIED COPD TYPE (HCC): Primary | ICD-10-CM

## 2018-01-22 DIAGNOSIS — M54.50 CHRONIC LEFT-SIDED LOW BACK PAIN WITHOUT SCIATICA: ICD-10-CM

## 2018-01-22 DIAGNOSIS — G89.29 CHRONIC LEFT-SIDED LOW BACK PAIN WITHOUT SCIATICA: ICD-10-CM

## 2018-01-22 DIAGNOSIS — E78.5 HYPERLIPIDEMIA, UNSPECIFIED HYPERLIPIDEMIA TYPE: ICD-10-CM

## 2018-01-22 DIAGNOSIS — I10 ESSENTIAL HYPERTENSION: ICD-10-CM

## 2018-01-22 DIAGNOSIS — J60 COALWORKER'S PNEUMOCONIOSIS (HCC): ICD-10-CM

## 2018-01-22 PROCEDURE — 99214 OFFICE O/P EST MOD 30 MIN: CPT | Performed by: NURSE PRACTITIONER

## 2018-01-22 RX ORDER — HYDROCODONE BITARTRATE AND ACETAMINOPHEN 7.5; 325 MG/1; MG/1
TABLET ORAL
Qty: 60 TABLET | Refills: 0 | Status: SHIPPED | OUTPATIENT
Start: 2018-01-22 | End: 2018-02-27 | Stop reason: SDUPTHER

## 2018-02-01 ENCOUNTER — LAB (OUTPATIENT)
Dept: LAB | Facility: OTHER | Age: 63
End: 2018-02-01

## 2018-02-01 DIAGNOSIS — Z12.5 PROSTATE CANCER SCREENING: ICD-10-CM

## 2018-02-01 DIAGNOSIS — I10 ESSENTIAL HYPERTENSION: ICD-10-CM

## 2018-02-01 DIAGNOSIS — E78.5 HYPERLIPIDEMIA, UNSPECIFIED HYPERLIPIDEMIA TYPE: ICD-10-CM

## 2018-02-01 LAB
ALBUMIN SERPL-MCNC: 4.1 G/DL (ref 3.2–5.5)
ALBUMIN/GLOB SERPL: 1.2 G/DL (ref 1–3)
ALP SERPL-CCNC: 63 U/L (ref 15–121)
ALT SERPL W P-5'-P-CCNC: 36 U/L (ref 10–60)
ANION GAP SERPL CALCULATED.3IONS-SCNC: 10 MMOL/L (ref 5–15)
AST SERPL-CCNC: 39 U/L (ref 10–60)
BILIRUB SERPL-MCNC: 0.7 MG/DL (ref 0.2–1)
BUN BLD-MCNC: <6 MG/DL (ref 8–25)
BUN/CREAT SERPL: ABNORMAL (ref 7–25)
CALCIUM SPEC-SCNC: 9 MG/DL (ref 8.4–10.8)
CHLORIDE SERPL-SCNC: 95 MMOL/L (ref 100–112)
CHOLEST SERPL-MCNC: 191 MG/DL (ref 150–200)
CO2 SERPL-SCNC: 31 MMOL/L (ref 20–32)
CREAT BLD-MCNC: 0.6 MG/DL (ref 0.4–1.3)
GFR SERPL CREATININE-BSD FRML MDRD: 137 ML/MIN/1.73 (ref 49–113)
GLOBULIN UR ELPH-MCNC: 3.4 GM/DL (ref 2.5–4.6)
GLUCOSE BLD-MCNC: 115 MG/DL (ref 70–100)
HDLC SERPL-MCNC: 121 MG/DL (ref 35–100)
LDLC SERPL CALC-MCNC: 61 MG/DL
LDLC/HDLC SERPL: 0.5 {RATIO}
POTASSIUM BLD-SCNC: 4.1 MMOL/L (ref 3.4–5.4)
PROT SERPL-MCNC: 7.5 G/DL (ref 6.7–8.2)
PSA SERPL-MCNC: 1.93 NG/ML (ref 0–4)
SODIUM BLD-SCNC: 136 MMOL/L (ref 134–146)
TRIGL SERPL-MCNC: 45 MG/DL (ref 35–160)
VLDLC SERPL-MCNC: 9 MG/DL

## 2018-02-01 PROCEDURE — 36415 COLL VENOUS BLD VENIPUNCTURE: CPT | Performed by: NURSE PRACTITIONER

## 2018-02-01 PROCEDURE — G0103 PSA SCREENING: HCPCS | Performed by: NURSE PRACTITIONER

## 2018-02-01 PROCEDURE — 80061 LIPID PANEL: CPT | Performed by: NURSE PRACTITIONER

## 2018-02-01 PROCEDURE — 80053 COMPREHEN METABOLIC PANEL: CPT | Performed by: NURSE PRACTITIONER

## 2018-02-27 DIAGNOSIS — G89.29 CHRONIC LEFT-SIDED LOW BACK PAIN WITHOUT SCIATICA: ICD-10-CM

## 2018-02-27 DIAGNOSIS — M54.50 CHRONIC LEFT-SIDED LOW BACK PAIN WITHOUT SCIATICA: ICD-10-CM

## 2018-03-01 RX ORDER — HYDROCODONE BITARTRATE AND ACETAMINOPHEN 7.5; 325 MG/1; MG/1
TABLET ORAL
Qty: 60 TABLET | Refills: 0 | Status: SHIPPED | OUTPATIENT
Start: 2018-03-01 | End: 2018-04-02 | Stop reason: SDUPTHER

## 2018-04-02 ENCOUNTER — OFFICE VISIT (OUTPATIENT)
Dept: FAMILY MEDICINE CLINIC | Facility: CLINIC | Age: 63
End: 2018-04-02

## 2018-04-02 VITALS
BODY MASS INDEX: 32.27 KG/M2 | HEIGHT: 60 IN | SYSTOLIC BLOOD PRESSURE: 160 MMHG | RESPIRATION RATE: 16 BRPM | WEIGHT: 164.38 LBS | TEMPERATURE: 98.8 F | HEART RATE: 74 BPM | OXYGEN SATURATION: 96 % | DIASTOLIC BLOOD PRESSURE: 82 MMHG

## 2018-04-02 DIAGNOSIS — G89.29 CHRONIC LEFT-SIDED LOW BACK PAIN WITHOUT SCIATICA: ICD-10-CM

## 2018-04-02 DIAGNOSIS — M54.50 CHRONIC LEFT-SIDED LOW BACK PAIN WITHOUT SCIATICA: ICD-10-CM

## 2018-04-02 DIAGNOSIS — I10 ESSENTIAL HYPERTENSION: ICD-10-CM

## 2018-04-02 DIAGNOSIS — M47.9 OSTEOARTHRITIS OF SPINE, UNSPECIFIED SPINAL OSTEOARTHRITIS COMPLICATION STATUS, UNSPECIFIED SPINAL REGION: Primary | ICD-10-CM

## 2018-04-02 DIAGNOSIS — Z79.899 ENCOUNTER FOR LONG-TERM CURRENT USE OF MEDICATION: ICD-10-CM

## 2018-04-02 DIAGNOSIS — E78.5 DYSLIPIDEMIA: ICD-10-CM

## 2018-04-02 DIAGNOSIS — M62.838 MUSCLE SPASM: ICD-10-CM

## 2018-04-02 DIAGNOSIS — J60 COALWORKER'S PNEUMOCONIOSIS (HCC): ICD-10-CM

## 2018-04-02 DIAGNOSIS — J44.9 CHRONIC OBSTRUCTIVE PULMONARY DISEASE, UNSPECIFIED COPD TYPE (HCC): ICD-10-CM

## 2018-04-02 PROCEDURE — 99214 OFFICE O/P EST MOD 30 MIN: CPT | Performed by: NURSE PRACTITIONER

## 2018-04-02 RX ORDER — IBUPROFEN AND FAMOTIDINE 26.6; 8 MG/1; MG/1
1 TABLET, FILM COATED ORAL 3 TIMES DAILY PRN
Qty: 90 TABLET | Refills: 5 | Status: SHIPPED | OUTPATIENT
Start: 2018-04-02 | End: 2018-11-29

## 2018-04-02 RX ORDER — PRAVASTATIN SODIUM 20 MG
20 TABLET ORAL
Qty: 30 TABLET | Refills: 5 | Status: SHIPPED | OUTPATIENT
Start: 2018-04-02 | End: 2018-09-25 | Stop reason: SDUPTHER

## 2018-04-02 RX ORDER — AMLODIPINE BESYLATE 10 MG/1
10 TABLET ORAL DAILY
Qty: 30 TABLET | Refills: 5 | Status: SHIPPED | OUTPATIENT
Start: 2018-04-02 | End: 2018-04-11 | Stop reason: SDUPTHER

## 2018-04-02 RX ORDER — MONTELUKAST SODIUM 10 MG/1
10 TABLET ORAL NIGHTLY
Qty: 30 TABLET | Refills: 5 | Status: SHIPPED | OUTPATIENT
Start: 2018-04-02 | End: 2018-09-25 | Stop reason: SDUPTHER

## 2018-04-02 RX ORDER — HYDROCODONE BITARTRATE AND ACETAMINOPHEN 7.5; 325 MG/1; MG/1
TABLET ORAL
Qty: 60 TABLET | Refills: 0 | Status: SHIPPED | OUTPATIENT
Start: 2018-04-02 | End: 2018-05-08 | Stop reason: SDUPTHER

## 2018-04-02 RX ORDER — TIZANIDINE 2 MG/1
2 TABLET ORAL EVERY 8 HOURS PRN
Qty: 30 TABLET | Refills: 0 | Status: SHIPPED | OUTPATIENT
Start: 2018-04-02 | End: 2018-05-03 | Stop reason: SDUPTHER

## 2018-04-02 RX ORDER — LORATADINE 10 MG/1
10 TABLET ORAL DAILY
Qty: 30 TABLET | Refills: 3 | Status: SHIPPED | OUTPATIENT
Start: 2018-04-02 | End: 2019-11-15 | Stop reason: SDUPTHER

## 2018-04-02 NOTE — PROGRESS NOTES
Chief Complaint   Patient presents with   • Back Pain     controlled med refill, r#[id number], last dose sun am   • Med Refill     controlled and other meds       Subjective   Derrell Bynum is a 63 y.o. male presents to the office for evaluation of back pain and refill of norco.    PMH  OA- stable; moderately controlled with prn norco    HTN-stable; well controlled with norvasc and prn clonidine    COPD- stable; managed with singulair, albuterol, breo    HPI   Patient presents for evaluation of back pain and refill of nroco. He is accompanied by his wife, Lacey who provides additional history.   Patient has worked in the underground coal mines for 17 years. All of his work requires him to physically be bend over at the waist, pulling and pushing weight. He injured his back in 1978 which required a saddle block. He has had back pain since that time.   He was being treated at [facility] pain clinic- however, he stopped going because his pain was not adequately being treated; they were only giving him IBU. PCP Vinod has been treating his pain since 07/2017. He takes norco 7.5 mg BID and reports good pain control for at least 4 hours after taking his medication. He states pain medication wears off early. But he reserves his second dose of medication for bedtime so he can sleep.     He has not taken any HTN medication of pain medication in 2 days because he has been out.     He is requesting a refill today of all medications.     mary reviewed  uds not current      HPI  Family History   Problem Relation Age of Onset   • Colon cancer Father      Colorectal Cancer   • Diabetes Father      Social History     Social History   • Marital status:      Spouse name: N/A   • Number of children: N/A   • Years of education: N/A     Occupational History   • Not on file.     Social History Main Topics   • Smoking status: Current Every Day Smoker     Packs/day: 1.00     Years: 35.00     Types: Cigarettes   • Smokeless tobacco:  "Never Used      Comment: 20-39 cigarettes/day (5/9/16)   • Alcohol use Yes      Comment: Beer - 8 drinks a day. A  typical drinking day (5/9/16)   • Drug use:      Types: Hydrocodone   • Sexual activity: Yes     Partners: Female     Other Topics Concern   • Not on file     Social History Narrative   • No narrative on file       The following portions of the patient's history were reviewed and updated as appropriate: allergies, current medications, past family history, past medical history, past social history, past surgical history and problem list.    Review of Systems   Constitutional: Negative for activity change, chills, fatigue and fever.   HENT: Negative for congestion, rhinorrhea, sinus pressure and sore throat.    Eyes: Negative for pain, discharge, itching and visual disturbance.   Respiratory: Positive for cough, shortness of breath and wheezing.         Chronic cough from COPD   Cardiovascular: Negative for chest pain, palpitations and leg swelling.   Gastrointestinal: Negative for abdominal pain, blood in stool, constipation, diarrhea, nausea and vomiting.   Endocrine: Negative for polydipsia and polyuria.   Genitourinary: Negative for dysuria, flank pain, frequency, hematuria and urgency.   Musculoskeletal: Positive for arthralgias, back pain and myalgias. Negative for gait problem.        Chronic arthritis   Skin: Negative for rash.   Neurological: Negative for dizziness, tremors, seizures, syncope, weakness and headaches.   Hematological: Negative for adenopathy. Does not bruise/bleed easily.   Psychiatric/Behavioral: Negative for confusion, dysphoric mood, sleep disturbance and suicidal ideas. The patient is not nervous/anxious.      14 Point ROS completed with pertinent positives discussed. All other systems reviewed and are negative.       Objective   Encounter Vitals  /82 Comment: pt bp up due to being in pain  Pulse 74   Temp 98.8 °F (37.1 °C) (Tympanic)   Resp 16   Ht 69 cm (27.17\")   " "Wt 74.6 kg (164 lb 6 oz)   SpO2 96%   .61 kg/m²   Vitals:    04/02/18 1456   BP: 160/82   Pulse: 74   Resp: 16   Temp: 98.8 °F (37.1 °C)   TempSrc: Tympanic   SpO2: 96%   Weight: 74.6 kg (164 lb 6 oz)   Height: 69 cm (27.17\")       Physical Exam   Constitutional: He is oriented to person, place, and time. Vital signs are normal. He appears well-developed and well-nourished. No distress.   HENT:   Head: Normocephalic and atraumatic.   Right Ear: Tympanic membrane, external ear and ear canal normal.   Left Ear: Tympanic membrane, external ear and ear canal normal.   Nose: Nose normal.   Mouth/Throat: Uvula is midline, oropharynx is clear and moist and mucous membranes are normal. No posterior oropharyngeal edema or posterior oropharyngeal erythema.   Eyes: Conjunctivae and lids are normal. Pupils are equal, round, and reactive to light. Right eye exhibits no discharge. Left eye exhibits no discharge. No scleral icterus.   Neck: Trachea normal and normal range of motion. Neck supple. No thyroid mass and no thyromegaly present.   No cervical, occipital or supraclavicular adenopathy.    Cardiovascular: Normal rate, S1 normal, S2 normal, normal heart sounds and intact distal pulses.  A regularly irregular rhythm present. Frequent extrasystoles are present. Exam reveals no gallop and no friction rub.    No murmur heard.  Elevated BP- has not taken HTN medication in 2 days  No dependent edema.   Previous EKG shows sinus rhythm with frequent premature beats   Pulmonary/Chest: Effort normal. No respiratory distress. He has decreased breath sounds in the right lower field and the left lower field. He has wheezes. He has rhonchi in the right upper field, the right lower field and the left lower field. He has no rales.   Diminished breath sounds to bases as usual for this patient.    Abdominal: Soft. Normal appearance and bowel sounds are normal. He exhibits no mass. There is no rebound and no guarding. "   Musculoskeletal: He exhibits tenderness. He exhibits no edema or deformity.        Lumbar back: He exhibits decreased range of motion, tenderness and spasm.   Mild kyphosis and lumbar tenderness.    Lymphadenopathy:     He has no cervical adenopathy.   Neurological: He is alert and oriented to person, place, and time. He has normal strength. No cranial nerve deficit or sensory deficit. Coordination and gait normal.   No balance disturbance observed.    Skin: Skin is warm and dry. No rash noted. He is not diaphoretic. No pallor.   Psychiatric: He has a normal mood and affect. His speech is normal and behavior is normal. Judgment and thought content normal. Cognition and memory are normal.   Nursing note and vitals reviewed.      Pertinent Labs  Lab on 02/01/2018   Component Date Value Ref Range Status   • Total Cholesterol 02/01/2018 191  150 - 200 mg/dL Final   • Triglycerides 02/01/2018 45  35 - 160 mg/dL Final   • HDL Cholesterol 02/01/2018 121* 35 - 100 mg/dL Final   • LDL Cholesterol  02/01/2018 61  mg/dL Final   • VLDL Cholesterol 02/01/2018 9  mg/dL Final   • LDL/HDL Ratio 02/01/2018 0.50   Final   • Glucose 02/01/2018 115* 70 - 100 mg/dL Final   • BUN 02/01/2018 <6* 8 - 25 mg/dL Final   • Creatinine 02/01/2018 0.60  0.40 - 1.30 mg/dL Final   • Sodium 02/01/2018 136  134 - 146 mmol/L Final   • Potassium 02/01/2018 4.1  3.4 - 5.4 mmol/L Final   • Chloride 02/01/2018 95* 100 - 112 mmol/L Final   • CO2 02/01/2018 31.0  20.0 - 32.0 mmol/L Final   • Calcium 02/01/2018 9.0  8.4 - 10.8 mg/dL Final   • Total Protein 02/01/2018 7.5  6.7 - 8.2 g/dL Final   • Albumin 02/01/2018 4.10  3.20 - 5.50 g/dL Final   • ALT (SGPT) 02/01/2018 36  10 - 60 U/L Final   • AST (SGOT) 02/01/2018 39  10 - 60 U/L Final   • Alkaline Phosphatase 02/01/2018 63  15 - 121 U/L Final   • Total Bilirubin 02/01/2018 0.7  0.2 - 1.0 mg/dL Final   • eGFR Non African Amer 02/01/2018 137* 49 - 113 mL/min/1.73 Final   • Globulin 02/01/2018 3.4  2.5 -  4.6 gm/dL Final   • A/G Ratio 02/01/2018 1.2  1.0 - 3.0 g/dL Final   • BUN/Creatinine Ratio 02/01/2018   7.0 - 25.0 Final    Unable to calculate Bun/Crea Ratio.   • Anion Gap 02/01/2018 10.0  5.0 - 15.0 mmol/L Final   • PSA 02/01/2018 1.930  0.000 - 4.000 ng/mL Final     Labs have been independently reviewed    Key Imaging/Tracings/POC Testing    Assessment and Medications  Problems Addressed this Visit        Cardiovascular and Mediastinum    Essential hypertension    Relevant Medications    amLODIPine (NORVASC) 10 MG tablet       Respiratory    Coalworker's pneumoconiosis    Relevant Medications    montelukast (SINGULAIR) 10 MG tablet    loratadine (CLARITIN) 10 MG tablet    COPD (chronic obstructive pulmonary disease)    Relevant Medications    montelukast (SINGULAIR) 10 MG tablet    loratadine (CLARITIN) 10 MG tablet       Nervous and Auditory    Chronic left-sided low back pain without sciatica    Relevant Medications    HYDROcodone-acetaminophen (LORTAB) 7.5-325 MG per tablet       Musculoskeletal and Integument    Muscle spasm    Relevant Medications    tiZANidine (ZANAFLEX) 2 MG tablet    Osteoarthritis of spine - Primary    Relevant Medications    Ibuprofen-Famotidine (DUEXIS) 800-26.6 MG tablet       Other    Encounter for long-term current use of medication    Relevant Orders    Pain Management Profile (13 Drugs) Urine - Urine, Clean Catch      Other Visit Diagnoses     Dyslipidemia        Relevant Medications    pravastatin (PRAVACHOL) 20 MG tablet        Side effects of ordered medications discussed with patient.     Plan/Additional Notes/Instructions  Plan   1. terry reviewed- will continue same dose norco  2. UDS tbc in 1-2 days  3. The patient has read and signed the [facility] Controlled Substance Contract. The patient understands the risks associated with this controlled medication, including tolerance and addiction.  TERRY was obtained and reviewed today, and will continue to be obtained and  reviewed every 3 months. Patient is aware, per the signed Controlled Substance Contract, that they are subject to random UDS and pill counts to remain in compliance. I will continue to see patient for regular follow-up appointments to monitor efficacy of treatment and compliance.   4. Refill of all other medications given today  5. Patient refuses EKG today-  6. Will re-assess heart at follow up- if he remains irregular, will proceed with cardiac work up as needed  7. Will add duexis to start today- samples given.   8. Will add tizanidine to start tonight- may take only at night, or up to 3 times/daily as needed for muscle spasm  9. F/u in 3 months and prn    Follow-Up  Return in about 3 months (around 7/2/2018), or if symptoms worsen or fail to improve.    Patient/caregiver verbalizes understanding of all orders and instructions in this plan of care.           This document has been electronically signed by JASMINE Brock on April 2, 2018 5:35 PM

## 2018-04-11 DIAGNOSIS — I10 ESSENTIAL HYPERTENSION: ICD-10-CM

## 2018-04-11 RX ORDER — AMLODIPINE BESYLATE 10 MG/1
10 TABLET ORAL DAILY
Qty: 30 TABLET | Refills: 5 | Status: SHIPPED | OUTPATIENT
Start: 2018-04-11 | End: 2018-09-25 | Stop reason: SDUPTHER

## 2018-04-30 DIAGNOSIS — M54.50 CHRONIC LEFT-SIDED LOW BACK PAIN WITHOUT SCIATICA: ICD-10-CM

## 2018-04-30 DIAGNOSIS — Z79.899 ENCOUNTER FOR LONG-TERM CURRENT USE OF MEDICATION: Primary | ICD-10-CM

## 2018-04-30 DIAGNOSIS — G89.29 CHRONIC LEFT-SIDED LOW BACK PAIN WITHOUT SCIATICA: ICD-10-CM

## 2018-04-30 DIAGNOSIS — M62.838 MUSCLE SPASM: ICD-10-CM

## 2018-04-30 RX ORDER — TIZANIDINE 2 MG/1
2 TABLET ORAL EVERY 8 HOURS PRN
Qty: 30 TABLET | Refills: 0 | OUTPATIENT
Start: 2018-04-30

## 2018-04-30 RX ORDER — HYDROCODONE BITARTRATE AND ACETAMINOPHEN 7.5; 325 MG/1; MG/1
TABLET ORAL
Qty: 60 TABLET | Refills: 0 | OUTPATIENT
Start: 2018-04-30

## 2018-05-02 ENCOUNTER — LAB (OUTPATIENT)
Dept: LAB | Facility: OTHER | Age: 63
End: 2018-05-02

## 2018-05-02 DIAGNOSIS — Z79.899 ENCOUNTER FOR LONG-TERM CURRENT USE OF MEDICATION: ICD-10-CM

## 2018-05-02 PROCEDURE — G0481 DRUG TEST DEF 8-14 CLASSES: HCPCS | Performed by: NURSE PRACTITIONER

## 2018-05-02 PROCEDURE — 80307 DRUG TEST PRSMV CHEM ANLYZR: CPT | Performed by: NURSE PRACTITIONER

## 2018-05-03 DIAGNOSIS — M62.838 MUSCLE SPASM: ICD-10-CM

## 2018-05-03 RX ORDER — TIZANIDINE 2 MG/1
2 TABLET ORAL EVERY 8 HOURS PRN
Qty: 30 TABLET | Refills: 0 | Status: SHIPPED | OUTPATIENT
Start: 2018-05-03 | End: 2018-05-31 | Stop reason: SDUPTHER

## 2018-05-08 DIAGNOSIS — M54.50 CHRONIC LEFT-SIDED LOW BACK PAIN WITHOUT SCIATICA: ICD-10-CM

## 2018-05-08 DIAGNOSIS — G89.29 CHRONIC LEFT-SIDED LOW BACK PAIN WITHOUT SCIATICA: ICD-10-CM

## 2018-05-08 LAB
7-AMINOCLONAZEPAM UR: NOT DETECTED NG/MG CREAT
A-OH ALPRAZ/CREAT UR: NOT DETECTED NG/MG CREAT
ALFENTANIL UR QL: NOT DETECTED NG/MG CREAT
ALPHA-HYDROXYTRIAZOLAM, URINE: NOT DETECTED NG/MG CREAT
AMOBARBITAL UR: NOT DETECTED
AMPHET UR QL CFM: NOT DETECTED NG/MG CREAT
AMPHETAMINES UR QL SCN: NEGATIVE
BARBITAL UR QL CFM: NOT DETECTED
BARBITURATES UR QL SCN: NEGATIVE
BENZODIAZ UR QL SCN: NEGATIVE
BENZOYLECGONINE UR: NOT DETECTED NG/MG CREAT
BUPRENORPHINE UR QL: NEGATIVE
BUPRENORPHINE UR QL: NOT DETECTED NG/MG CREAT
BUTABARBITAL UR QL: NOT DETECTED
BUTALBITAL UR QL: NOT DETECTED
CANNABINOIDS UR QL CFM: NEGATIVE
CLONAZEPAM UR QL: NOT DETECTED NG/MG CREAT
COCAETHYLENE UR QL CFM: NOT DETECTED NG/MG CREAT
COCAINE UR QL CFM: NEGATIVE
CODEINE UR QL: NOT DETECTED NG/MG CREAT
CONV COCAINE, UR: NOT DETECTED NG/MG CREAT
CONV REPORT SUMMARY: NORMAL
CREAT 24H UR-MCNC: 76 MG/DL
DESALKYLFLURAZ/CREAT UR: NOT DETECTED NG/MG CREAT
DIAZEPAM UR-MCNC: NOT DETECTED NG/MG CREAT
DIHYDROCODEINE UR: 166 NG/MG CREAT
EDDP SERPL QL: NOT DETECTED NG/MG CREAT
ETHANOL SCREEN URINE (REF): NORMAL
ETHANOL UR-MCNC: 0.06 G/DL
FENTANYL UR QL: NEGATIVE
FENTANYL+NORFENTANYL UR QL SCN: NOT DETECTED NG/MG CREAT
HX OF MEDICATION USE: NORMAL
HYDROCODONE UR QL: 1436 NG/MG CREAT
HYDROMORPHONE UR QL: 407 NG/MG CREAT
LEVEL OF DETECTION:: NORMAL
LORAZEPAM UR-MCNC: NOT DETECTED NG/MG CREAT
LORAZEPAM/CREAT UR: NOT DETECTED NG/MG CREAT
MDA SERPLBLD-MCNC: NOT DETECTED NG/MG CREAT
MDMA UR QL SCN: NOT DETECTED NG/MG CREAT
MEPHOBARBITAL UR QL CFM: NOT DETECTED
METHADONE BLD QL SCN: NEGATIVE
METHADONE, URINE: NOT DETECTED NG/MG CREAT
METHAMPHETAMINE UR: NOT DETECTED NG/MG CREAT
MIDAZOLAM UR-MCNC: NOT DETECTED NG/MG CREAT
MORPHINE UR QL: NOT DETECTED NG/MG CREAT
N-DESMETHYLTRAMADOL, U: NOT DETECTED
NARCOTICS UR: NEGATIVE
NORBUPRENORPHINE SERPLBLD-MCNC: NOT DETECTED NG/MG CREAT
NORCODEINE UR-MCNC: NOT DETECTED NG/MG CREAT
NORDIAZEPAM SERPL CFM-MCNC: NOT DETECTED NG/MG CREAT
NORFENTANYL UR: NOT DETECTED NG/MG CREAT
NOROXYMORPHONE: NOT DETECTED NG/MG CREAT
O-DESMETHYLTRAMADOL, UR: NOT DETECTED
OPIATES UR QL CFM: NORMAL
OPIATES, URINE, NORHYDROCODONE: 467 NG/MG CREAT
OPIATES, URINE, NOROXYCODONE: NOT DETECTED NG/MG CREAT
OXAZEPAM UR QL: NOT DETECTED NG/MG CREAT
OXYCODONE UR QL: NEGATIVE
OXYCODONE UR: NOT DETECTED NG/MG CREAT
OXYMORPHONE UR: NOT DETECTED NG/MG CREAT
PENTOBARBITAL UR: NOT DETECTED
PHENOBARB UR QL: NOT DETECTED
SECOBARBITAL UR QL: NOT DETECTED
SUFENTANIL UR QL: NOT DETECTED NG/MG CREAT
TAPENTADOL SERPLBLD-MCNC: NEGATIVE NG/ML
TAPENTADOL UR-MCNC: NOT DETECTED NG/MG CREAT
TEMAZEPAM UR QL CFM: NOT DETECTED NG/MG CREAT
THC UR CFM-MCNC: NOT DETECTED NG/MG CREAT
THIOPENTAL UR QL CFM: NOT DETECTED
TRAMADOL UR: NOT DETECTED

## 2018-05-08 RX ORDER — HYDROCODONE BITARTRATE AND ACETAMINOPHEN 7.5; 325 MG/1; MG/1
TABLET ORAL
Qty: 60 TABLET | Refills: 0 | Status: SHIPPED | OUTPATIENT
Start: 2018-05-08 | End: 2018-06-06 | Stop reason: SDUPTHER

## 2018-05-31 DIAGNOSIS — M62.838 MUSCLE SPASM: ICD-10-CM

## 2018-05-31 RX ORDER — TIZANIDINE 2 MG/1
2 TABLET ORAL EVERY 8 HOURS PRN
Qty: 30 TABLET | Refills: 0 | Status: SHIPPED | OUTPATIENT
Start: 2018-05-31 | End: 2018-06-28 | Stop reason: SDUPTHER

## 2018-06-06 DIAGNOSIS — G89.29 CHRONIC LEFT-SIDED LOW BACK PAIN WITHOUT SCIATICA: ICD-10-CM

## 2018-06-06 DIAGNOSIS — M54.50 CHRONIC LEFT-SIDED LOW BACK PAIN WITHOUT SCIATICA: ICD-10-CM

## 2018-06-06 RX ORDER — HYDROCODONE BITARTRATE AND ACETAMINOPHEN 7.5; 325 MG/1; MG/1
TABLET ORAL
Qty: 60 TABLET | Refills: 0 | Status: SHIPPED | OUTPATIENT
Start: 2018-06-06 | End: 2018-06-28 | Stop reason: SDUPTHER

## 2018-06-28 ENCOUNTER — OFFICE VISIT (OUTPATIENT)
Dept: FAMILY MEDICINE CLINIC | Facility: CLINIC | Age: 63
End: 2018-06-28

## 2018-06-28 VITALS
DIASTOLIC BLOOD PRESSURE: 74 MMHG | SYSTOLIC BLOOD PRESSURE: 136 MMHG | HEIGHT: 69 IN | TEMPERATURE: 97.9 F | RESPIRATION RATE: 20 BRPM | HEART RATE: 92 BPM | OXYGEN SATURATION: 97 % | WEIGHT: 151 LBS | BODY MASS INDEX: 22.36 KG/M2

## 2018-06-28 DIAGNOSIS — I10 ESSENTIAL HYPERTENSION: ICD-10-CM

## 2018-06-28 DIAGNOSIS — M54.50 CHRONIC LEFT-SIDED LOW BACK PAIN WITHOUT SCIATICA: ICD-10-CM

## 2018-06-28 DIAGNOSIS — G89.29 CHRONIC LEFT-SIDED LOW BACK PAIN WITHOUT SCIATICA: ICD-10-CM

## 2018-06-28 DIAGNOSIS — M47.9 OSTEOARTHRITIS OF SPINE, UNSPECIFIED SPINAL OSTEOARTHRITIS COMPLICATION STATUS, UNSPECIFIED SPINAL REGION: Primary | ICD-10-CM

## 2018-06-28 DIAGNOSIS — M62.838 MUSCLE SPASM: ICD-10-CM

## 2018-06-28 DIAGNOSIS — R73.9 HYPERGLYCEMIA: ICD-10-CM

## 2018-06-28 DIAGNOSIS — Z78.9 ALCOHOL USE: ICD-10-CM

## 2018-06-28 PROCEDURE — 99213 OFFICE O/P EST LOW 20 MIN: CPT | Performed by: NURSE PRACTITIONER

## 2018-06-28 RX ORDER — HYDROCODONE BITARTRATE AND ACETAMINOPHEN 7.5; 325 MG/1; MG/1
TABLET ORAL
Qty: 60 TABLET | Refills: 0 | Status: SHIPPED | OUTPATIENT
Start: 2018-06-28 | End: 2018-07-31 | Stop reason: SDUPTHER

## 2018-06-28 RX ORDER — HYDROCODONE BITARTRATE AND ACETAMINOPHEN 7.5; 325 MG/1; MG/1
TABLET ORAL
Qty: 60 TABLET | Refills: 0 | Status: SHIPPED | OUTPATIENT
Start: 2018-06-28 | End: 2018-06-28 | Stop reason: SDUPTHER

## 2018-06-28 RX ORDER — TIZANIDINE 2 MG/1
2 TABLET ORAL EVERY 8 HOURS PRN
Qty: 30 TABLET | Refills: 5 | Status: SHIPPED | OUTPATIENT
Start: 2018-06-28 | End: 2018-11-29 | Stop reason: SDUPTHER

## 2018-06-28 NOTE — PROGRESS NOTES
"Chief Complaint   Patient presents with   • Back Pain     Controlled refill, R#[id number], Last dose:this am. Pt states \"here for refills, only hurting a little bit at the moment.\"   • Med Refill     controlled refill.       Subjective   Derrell Bynum is a 63 y.o. male presents to the office for evaluation of chronic back pain, OA and refill of pain medication.    PMH  OA- stable; moderately controlled with prn norco    HTN-stable; well controlled with norvasc and prn clonidine    COPD- stable; managed with singulair, albuterol, breo    HPI   Patient presents for re-evaluation of OA and refill of pain medication. He is accompanied by his wife, Lacey who provides additional history.   Patient has worked in the underground coal mines for 17 years. All of his work requires him to physically be bend over at the waist, pulling and pushing weight. He injured his back in 1978 which required a saddle block. He has had back pain since that time.  Pain is localized to lumbar spine- is constant and described as an ache. He denies radiation of pain. No loss of bowel and bladder function.   He takes norco 7.5 mg BID and reports good pain control for at least 4 hours after taking his medication. He states pain medication wears off early. But he reserves his second dose of medication for bedtime so he can sleep.     Daily ETOH use  Last BM today    mary reviewed  UDS 05/2/18    HPI  Family History   Problem Relation Age of Onset   • Colon cancer Father         Colorectal Cancer   • Diabetes Father      Social History     Social History   • Marital status:      Spouse name: N/A   • Number of children: N/A   • Years of education: N/A     Occupational History   • Not on file.     Social History Main Topics   • Smoking status: Current Every Day Smoker     Packs/day: 1.00     Years: 35.00     Types: Cigarettes   • Smokeless tobacco: Never Used      Comment: 20-39 cigarettes/day (5/9/16)   • Alcohol use Yes      Comment: Beer " "- 8 drinks a day. A  typical drinking day (5/9/16)   • Drug use: Yes     Types: Hydrocodone   • Sexual activity: Yes     Partners: Female     Other Topics Concern   • Not on file     Social History Narrative   • No narrative on file       The following portions of the patient's history were reviewed and updated as appropriate: allergies, current medications, past family history, past medical history, past social history, past surgical history and problem list.    Review of Systems   Constitutional: Negative for activity change, chills, fatigue and fever.   HENT: Negative for congestion, rhinorrhea, sinus pressure and sore throat.    Eyes: Negative for pain, discharge, itching and visual disturbance.   Respiratory: Positive for cough, shortness of breath and wheezing.         Chronic cough from COPD   Cardiovascular: Negative for chest pain, palpitations and leg swelling.   Gastrointestinal: Negative for abdominal pain, blood in stool, constipation, diarrhea, nausea and vomiting.   Endocrine: Negative for polydipsia and polyuria.   Genitourinary: Negative for dysuria, flank pain, frequency, hematuria and urgency.   Musculoskeletal: Positive for arthralgias, back pain and myalgias. Negative for gait problem.        Chronic arthritis   Skin: Negative for rash.   Neurological: Negative for dizziness, tremors, seizures, syncope, weakness and headaches.   Hematological: Negative for adenopathy. Does not bruise/bleed easily.   Psychiatric/Behavioral: Negative for confusion, dysphoric mood, sleep disturbance and suicidal ideas. The patient is not nervous/anxious.      14 Point ROS completed with pertinent positives discussed. All other systems reviewed and are negative.       Objective   Encounter Vitals  /74   Pulse 92   Temp 97.9 °F (36.6 °C) (Tympanic)   Resp 20   Ht 175.3 cm (69\")   Wt 68.5 kg (151 lb)   SpO2 97%   BMI 22.30 kg/m²   Vitals:    06/28/18 0904   BP: 136/74   Pulse: 92   Resp: 20   Temp: 97.9 " "°F (36.6 °C)   TempSrc: Tympanic   SpO2: 97%   Weight: 68.5 kg (151 lb)   Height: 175.3 cm (69\")       Physical Exam   Constitutional: He is oriented to person, place, and time. Vital signs are normal. He appears well-developed and well-nourished. No distress.   HENT:   Head: Normocephalic and atraumatic.   Right Ear: Tympanic membrane, external ear and ear canal normal.   Left Ear: Tympanic membrane, external ear and ear canal normal.   Nose: Nose normal.   Mouth/Throat: Uvula is midline, oropharynx is clear and moist and mucous membranes are normal. No posterior oropharyngeal edema or posterior oropharyngeal erythema.   Eyes: Conjunctivae and lids are normal. Pupils are equal, round, and reactive to light. Right eye exhibits no discharge. Left eye exhibits no discharge. No scleral icterus.   Neck: Trachea normal and normal range of motion. Neck supple. No thyroid mass and no thyromegaly present.   No cervical, occipital or supraclavicular adenopathy.    Cardiovascular: Normal rate, regular rhythm, S1 normal, S2 normal, normal heart sounds and intact distal pulses.   Occasional extrasystoles are present. Exam reveals no gallop and no friction rub.    No murmur heard.  Pulmonary/Chest: Effort normal. No respiratory distress. He has decreased breath sounds in the right lower field and the left lower field. He has wheezes. He has rhonchi in the right upper field, the right lower field and the left lower field. He has no rales.   Diminished breath sounds to bases as usual for this patient.    Abdominal: Soft. Normal appearance and bowel sounds are normal. He exhibits no mass. There is no rebound and no guarding.   Musculoskeletal: He exhibits tenderness. He exhibits no edema or deformity.        Lumbar back: He exhibits decreased range of motion, tenderness and spasm.   Mild kyphosis and lumbar tenderness.    Lymphadenopathy:     He has no cervical adenopathy.   Neurological: He is alert and oriented to person, place, " and time. He has normal strength. No cranial nerve deficit or sensory deficit. Coordination and gait normal.   No balance disturbance observed.    Skin: Skin is warm and dry. No rash noted. He is not diaphoretic. No pallor.   Psychiatric: He has a normal mood and affect. His speech is normal and behavior is normal. Judgment and thought content normal. Cognition and memory are normal.   Nursing note and vitals reviewed.      Pertinent Labs  Lab on 05/02/2018   Component Date Value Ref Range Status   • Report Summary 05/02/2018 FINAL   Final    Comment: ====================================================================  [facility] SELECT 13 K[facility]-DIS  ====================================================================  Test                             Result       Flag       Units  Drug Present    Alcohol, Ethyl                 0.063                   g/dL     Sources of ethyl alcohol include alcoholic beverages or as a     fermentation product of glucose; glucose was not detected in this     specimen. Ethyl alcohol result should be interpreted in the     context of all available clinical and behavioral information.    Hydrocodone                    1436                    ng/mg creat    Hydromorphone                  407                     ng/mg creat    Dihydrocodeine                 166                     ng/mg creat    Norhydrocodone                 467                     ng/mg creat     Sources of hydrocodone include scheduled prescription     medications. Hydromorphone, dihydrocodeine and norhydrocodone are     expected                            metabolites of hydrocodone. Hydromorphone and     dihydrocodeine are also available as scheduled prescription     medications.  ====================================================================  Test                      Result    Flag   Units      Ref Range    Creatinine              76               mg/dL       >=20  ====================================================================  Declared Medications:   Medication list was not provided.  ====================================================================  For clinical consultation, please call [phone].  ====================================================================   • Ethanol Screen Urine (Ref) 05/02/2018 +POSITIVE+   Final   • Ethanol, Urine 05/02/2018 0.063  g/dL Final   • Amphetamine, Urine Qual 05/02/2018 Negative   Final   • Methamphetamine, Urine 05/02/2018 Not Detected  ng/mg creat Final   • Amphetamine, Urine 05/02/2018 Not Detected  ng/mg creat Final   • MDMA URINE 05/02/2018 Not Detected  ng/mg creat Final   • MDA 05/02/2018 Not Detected  ng/mg creat Final   • Benzodiazepine Screen, Urine 05/02/2018 Negative   Final   • DIAZEPAM URINE QUANT (REF) 05/02/2018 Not Detected  ng/mg creat Final   • Desmethyldiazepam 05/02/2018 Not Detected  ng/mg creat Final   • Oxazepam, urine 05/02/2018 Not Detected  ng/mg creat Final   • Temazepam, urine 05/02/2018 Not Detected  ng/mg creat Final    Expected metabolism of benzodiazepine class drugs:   Parent Drug       Detected Metabolites   -----------       --------------------   Diazepam:         Desmethyldiazepam, Temazepam, Oxazepam   Chlordiazepoxide: Desmethyldiazepam, Oxazepam   Clorazepate:      Desmethyldiazepam, Oxazepam   Halazepam:        Desmethyldiazepam, Oxazepam   Temazepam:        Oxazepam   Oxazepam:         None   • ALPRAZOLAM 05/02/2018 Not Detected  ng/mg creat Final   • ALPHA-HYDROXYALPRAZOLAM UR, QT (RE* 05/02/2018 Not Detected  ng/mg creat Final   • DESALKLFLURAZEPAM UR QUANT (REF) 05/02/2018 Not Detected  ng/mg creat Final   • LORAZEPAM URINE QUANT (REF) 05/02/2018 Not Detected  ng/mg creat Final   • Alpha-hydroxytriazolam, Urine 05/02/2018 Not Detected  ng/mg creat Final   • Clonazepam Urine 05/02/2018 Not Detected  ng/mg creat Final   • 7-Aminoclonazepam Urine 05/02/2018 Not Detected   ng/mg creat Final   • MIDAZOLAM UR, QUANT (REF) 05/02/2018 Not Detected  ng/mg creat Final   • Cocaine & Metabolite 05/02/2018 Negative   Final   • Cocaine Screen, Urine 05/02/2018 Not Detected  ng/mg creat Final   • Benzoylecgonine, Urine 05/02/2018 Not Detected  ng/mg creat Final   • Cocaethylene Ur 05/02/2018 Not Detected  ng/mg creat Final   • THC, Urine 05/02/2018 Negative   Final   • Carboxy THC, Urine 05/02/2018 Not Detected  ng/mg creat Final   • Opiate Class 05/02/2018 +POSITIVE+   Final   • Codeine, Urine 05/02/2018 Not Detected  ng/mg creat Final   • Morphine, Urine 05/02/2018 Not Detected  ng/mg creat Final   • Norcodeine Ur 05/02/2018 Not Detected  ng/mg creat Final   • Hydrocodone UR 05/02/2018 1436  ng/mg creat Final   • Hydromorphone Urine 05/02/2018 407  ng/mg creat Final   • Dihydrocodeine, Urine 05/02/2018 166  ng/mg creat Final   • Opiates, Norhydrocodone, Urine 05/02/2018 467  ng/mg creat Final    Expected metabolism of opiate class drugs:   Parent Drug       Detected Metabolites   -----------       --------------------   Codeine:          Major:  Morphine, Norcodeine                     Minor:  Hydrocodone, Hydromorphone,                             Dihydrocodeine, Norhydrocodone   Morphine:         Minor:  Hydromorphone   Hydrocodone:      Hydromorphone, Dihydrocodeine,                     Norhydrocodone   Hydromorphone:    None   Dihydrocodeine:   None   Heroin:           6-Acetylmorphine (if included),                     Morphine                     Codeine, in small amounts in comparison                      to morphine, is often detected when                      heroin is the source drug.   • Oxycodone Class Ur 05/02/2018 Negative   Final   • Oxycodone, Confirmation, Urine 05/02/2018 Not Detected  ng/mg creat Final   • Oxymorphone UR 05/02/2018 Not Detected  ng/mg creat Final   • Opiates, Noroxycodone, Urine 05/02/2018 Not Detected  ng/mg creat Final   • Noroxymorphone 05/02/2018 Not  Detected  ng/mg creat Final    Expected metabolism of oxycodone class drugs:   Parent Drug       Detected Metabolites   -----------       --------------------   Oxycodone:        Oxymorphone, Noroxycodone, Noroxymorphone   Oxymorphone:      Noroxymorphone   • Methadone 05/02/2018 Negative   Final   • Methadone, Quantitative 05/02/2018 Not Detected  ng/mg creat Final   • EDDP 05/02/2018 Not Detected  ng/mg creat Final   • Fentanyl and Analogues, Ur 05/02/2018 Negative   Final   • Fentanyl, Urine 05/02/2018 Not Detected  ng/mg creat Final   • Norfentanyl Urine 05/02/2018 Not Detected  ng/mg creat Final   • Sufentanil, Ur 05/02/2018 Not Detected  ng/mg creat Final   • Alfentanil, Ur 05/02/2018 Not Detected  ng/mg creat Final   • BUPRENORPHINE 05/02/2018 Negative   Final   • Buprenorphine, Urine 05/02/2018 Not Detected  ng/mg creat Final   • Norbuprenorphine 05/02/2018 Not Detected  ng/mg creat Final   • BARBITURATES, URINE 05/02/2018 Negative   Final   • Amobarbital, Urine 05/02/2018 Not Detected   Final   • Barbital 05/02/2018 Not Detected   Final   • Butabarbital, Ur 05/02/2018 Not Detected   Final   • Butalbital Screen, Urine 05/02/2018 Not Detected   Final   • Mephobarbital Urine 05/02/2018 Not Detected   Final   • Pentobarbital UR 05/02/2018 Not Detected   Final   • Phenobarbital 05/02/2018 Not Detected   Final   • Secobarbital, urine 05/02/2018 Not Detected   Final   • Thiopental, Ur 05/02/2018 Not Detected   Final   • Tapentadol 05/02/2018 Negative   Final   • Tapentadol Ur 05/02/2018 Not Detected  ng/mg creat Final   • Opoidss other, UR 05/02/2018 Negative   Final   • Tramadol, Urine 05/02/2018 Not Detected   Final   • O-desmethyltramadol, Ur 05/02/2018 Not Detected   Final   • N-Desmethyltramadol, U 05/02/2018 Not Detected   Final   • Creatinine, Urine 05/02/2018 76  mg/dL Final    REFERENCE RANGE: Ref Range>=20   • Declared Medications: 05/02/2018 Comment   Final    Not Provided  Medication list was not  provided.   • Level of Detection: 05/02/2018 Comment   Final    Level of detection:  TESTING THRESHOLDS ARE AS FOLLOWS:  AMPHETAMINES: 50 ng/mL  BENZODIAZEPINES: 50 ng/mL  COCAINE / METABOLITE: 50 ng/mL  ALCOHOL, ETHYL: 0.020 gm/dL  FENTANYL / ANALOGUES: fentanyl-1.0 ng/mL, others-5.0 ng/mL  BUPRENORPHINE: buprenorphine-1.0 ng/mL,                 norbuprenorphine-5 ng/mL  TAPENTADOL: 50 ng/mL  CANNABINOIDS: total-20 ng/mL, carboxy-THC-2 ng/mL  METHADONE: 50 ng/mL  OPIATE CLASS: 50 ng/mL  OXYCODONE CLASS: 50 ng/mL  BARBITURATES: 200 ng/mL  TRAMADOL: 50 ng/mL  This test was developed and its performance characteristics  determined by [facility].  It has not been cleared or approved  by the Food and Drug Administration.     Labs have been independently reviewed    Key Imaging/Tracings/POC Testing    Assessment and Medications  Problems Addressed this Visit        Cardiovascular and Mediastinum    Essential hypertension    Relevant Orders    CBC & Differential    Comprehensive Metabolic Panel    Hemoglobin A1c    LDL Cholesterol, Direct    TSH    T4, Free    Vitamin B12       Nervous and Auditory    Chronic left-sided low back pain without sciatica    Relevant Medications    HYDROcodone-acetaminophen (LORTAB) 7.5-325 MG per tablet       Musculoskeletal and Integument    Muscle spasm    Relevant Medications    tiZANidine (ZANAFLEX) 2 MG tablet    Osteoarthritis of spine - Primary      Other Visit Diagnoses     Hyperglycemia        Relevant Orders    Hemoglobin A1c    Alcohol use        Relevant Orders    Vitamin B12        Side effects of ordered medications discussed with patient.     Plan/Additional Notes/Instructions  Plan   1. mary reviewed- will continue same dose norco for OA pain  2. Refills for 2 months will be reviewed by Dr. Roche  3. Labs reviewed-UDS compliant  4. Routine fasting labs tbc in 08/2018- schedule f/u for review after completion  5. Refill of zanaflex today X 6 months  6. If you experience any  acute worsening of symptoms,respiratory distress, chest pain, confusion, syncope, lethargy, or feelings of impending doom, call 911 or have someone drive you to the nearest ER.  7. Otherwise f/u in 3 months for re-evaluation and medication refill    Follow-Up  Return in about 3 months (around 9/28/2018), or if symptoms worsen or fail to improve.    Patient/caregiver verbalizes understanding of all orders and instructions in this plan of care.           This document has been electronically signed by JASMINE Brock on June 28, 2018 2:17 PM

## 2018-07-31 DIAGNOSIS — M54.50 CHRONIC LEFT-SIDED LOW BACK PAIN WITHOUT SCIATICA: ICD-10-CM

## 2018-07-31 DIAGNOSIS — G89.29 CHRONIC LEFT-SIDED LOW BACK PAIN WITHOUT SCIATICA: ICD-10-CM

## 2018-07-31 RX ORDER — HYDROCODONE BITARTRATE AND ACETAMINOPHEN 7.5; 325 MG/1; MG/1
TABLET ORAL
Qty: 60 TABLET | Refills: 0 | Status: SHIPPED | OUTPATIENT
Start: 2018-07-31 | End: 2018-08-31 | Stop reason: SDUPTHER

## 2018-08-31 DIAGNOSIS — M54.50 CHRONIC LEFT-SIDED LOW BACK PAIN WITHOUT SCIATICA: ICD-10-CM

## 2018-08-31 DIAGNOSIS — G89.29 CHRONIC LEFT-SIDED LOW BACK PAIN WITHOUT SCIATICA: ICD-10-CM

## 2018-08-31 RX ORDER — HYDROCODONE BITARTRATE AND ACETAMINOPHEN 7.5; 325 MG/1; MG/1
TABLET ORAL
Qty: 60 TABLET | Refills: 0 | Status: SHIPPED | OUTPATIENT
Start: 2018-08-31 | End: 2018-09-25 | Stop reason: SDUPTHER

## 2018-09-25 ENCOUNTER — OFFICE VISIT (OUTPATIENT)
Dept: FAMILY MEDICINE CLINIC | Facility: CLINIC | Age: 63
End: 2018-09-25

## 2018-09-25 VITALS
HEART RATE: 75 BPM | BODY MASS INDEX: 23.28 KG/M2 | HEIGHT: 69 IN | OXYGEN SATURATION: 98 % | TEMPERATURE: 98.6 F | DIASTOLIC BLOOD PRESSURE: 80 MMHG | WEIGHT: 157.2 LBS | SYSTOLIC BLOOD PRESSURE: 144 MMHG

## 2018-09-25 DIAGNOSIS — J44.9 CHRONIC OBSTRUCTIVE PULMONARY DISEASE, UNSPECIFIED COPD TYPE (HCC): ICD-10-CM

## 2018-09-25 DIAGNOSIS — E78.5 DYSLIPIDEMIA: ICD-10-CM

## 2018-09-25 DIAGNOSIS — I10 ESSENTIAL HYPERTENSION: ICD-10-CM

## 2018-09-25 DIAGNOSIS — M47.9 OSTEOARTHRITIS OF SPINE, UNSPECIFIED SPINAL OSTEOARTHRITIS COMPLICATION STATUS, UNSPECIFIED SPINAL REGION: ICD-10-CM

## 2018-09-25 DIAGNOSIS — G89.29 CHRONIC LEFT-SIDED LOW BACK PAIN WITHOUT SCIATICA: Primary | ICD-10-CM

## 2018-09-25 DIAGNOSIS — M54.50 CHRONIC LEFT-SIDED LOW BACK PAIN WITHOUT SCIATICA: Primary | ICD-10-CM

## 2018-09-25 PROCEDURE — 99214 OFFICE O/P EST MOD 30 MIN: CPT | Performed by: FAMILY MEDICINE

## 2018-09-25 RX ORDER — MONTELUKAST SODIUM 10 MG/1
10 TABLET ORAL NIGHTLY
Qty: 30 TABLET | Refills: 5 | Status: SHIPPED | OUTPATIENT
Start: 2018-09-25 | End: 2018-11-29 | Stop reason: SDUPTHER

## 2018-09-25 RX ORDER — HYDROCODONE BITARTRATE AND ACETAMINOPHEN 7.5; 325 MG/1; MG/1
TABLET ORAL
Qty: 60 TABLET | Refills: 0 | Status: SHIPPED | OUTPATIENT
Start: 2018-09-25 | End: 2018-10-29 | Stop reason: SDUPTHER

## 2018-09-25 RX ORDER — AMLODIPINE BESYLATE 10 MG/1
10 TABLET ORAL DAILY
Qty: 30 TABLET | Refills: 5 | Status: SHIPPED | OUTPATIENT
Start: 2018-09-25 | End: 2018-11-29 | Stop reason: SDUPTHER

## 2018-09-25 RX ORDER — PRAVASTATIN SODIUM 20 MG
20 TABLET ORAL
Qty: 30 TABLET | Refills: 5 | Status: SHIPPED | OUTPATIENT
Start: 2018-09-25 | End: 2018-11-29 | Stop reason: SDUPTHER

## 2018-09-25 NOTE — PROGRESS NOTES
Subjective   Derrell Bynum is a 63 y.o. male.   Chief Complaint   Patient presents with   • Med Refill     Control       Back Pain   This is a chronic problem. The current episode started more than 1 year ago. The problem occurs daily. The problem is unchanged. The pain is present in the lumbar spine. The pain is at a severity of 4/10. The pain is moderate. The pain is worse during the day. The symptoms are aggravated by bending, position, standing and twisting. Associated symptoms include numbness and weakness. Pertinent negatives include no abdominal pain, bladder incontinence, bowel incontinence, chest pain, dysuria, fever or headaches. He has tried NSAIDs, muscle relaxant and analgesics for the symptoms. The treatment provided significant relief.        The following portions of the patient's history were reviewed and updated as appropriate: allergies, current medications, past family history, past medical history, past social history, past surgical history and problem list.    Review of Systems   Constitutional: Negative for activity change, chills, fatigue and fever.   HENT: Negative for congestion, rhinorrhea, sinus pressure and sore throat.    Eyes: Negative for pain, discharge, itching and visual disturbance.   Respiratory: Positive for cough, shortness of breath and wheezing.         Chronic cough from COPD   Cardiovascular: Negative for chest pain, palpitations and leg swelling.   Gastrointestinal: Negative for abdominal pain, blood in stool, bowel incontinence, constipation, diarrhea, nausea and vomiting.   Endocrine: Negative for polydipsia and polyuria.   Genitourinary: Negative for bladder incontinence, dysuria, flank pain, frequency, hematuria and urgency.   Musculoskeletal: Positive for arthralgias, back pain and myalgias. Negative for gait problem.        Chronic arthritis   Skin: Negative for rash.   Neurological: Positive for weakness and numbness. Negative for dizziness, tremors, seizures,  syncope and headaches.   Hematological: Negative for adenopathy. Does not bruise/bleed easily.   Psychiatric/Behavioral: Negative for confusion, dysphoric mood, sleep disturbance and suicidal ideas. The patient is not nervous/anxious.        Objective   Physical Exam   Constitutional: He is oriented to person, place, and time. He appears well-developed and well-nourished.   HENT:   Head: Normocephalic and atraumatic.   Right Ear: External ear normal.   Left Ear: External ear normal.   Nose: Nose normal.   Mouth/Throat: Oropharynx is clear and moist.   Eyes: Pupils are equal, round, and reactive to light. Conjunctivae and EOM are normal.   Neck: Normal range of motion. Neck supple.   Cardiovascular: Normal rate, regular rhythm, normal heart sounds and intact distal pulses.  Exam reveals no gallop and no friction rub.    No murmur heard.  Pulmonary/Chest: Effort normal. He has decreased breath sounds. He has wheezes. He has no rales.   Abdominal: Soft. Bowel sounds are normal. He exhibits no mass. There is no tenderness. There is no rebound and no guarding.   Musculoskeletal:        Lumbar back: He exhibits decreased range of motion and tenderness.   Neurological: He is alert and oriented to person, place, and time. He has normal reflexes. No cranial nerve deficit. He exhibits normal muscle tone.   Skin: Skin is warm and dry. No rash noted.   Psychiatric: He has a normal mood and affect. His behavior is normal. Judgment and thought content normal.   Nursing note and vitals reviewed.  Ravi reviewed    Assessment/Plan   Derrell was seen today for med refill.    Diagnoses and all orders for this visit:    Chronic left-sided low back pain without sciatica  -     HYDROcodone-acetaminophen (LORTAB) 7.5-325 MG per tablet; Take one po bid only if needed for pain.    Osteoarthritis of spine, unspecified spinal osteoarthritis complication status, unspecified spinal region    Essential hypertension  -     amLODIPine (NORVASC) 10  MG tablet; Take 1 tablet by mouth Daily.    Chronic obstructive pulmonary disease, unspecified COPD type (CMS/HCC)  -     montelukast (SINGULAIR) 10 MG tablet; Take 1 tablet by mouth Every Night.    Dyslipidemia  -     pravastatin (PRAVACHOL) 20 MG tablet; Take 1 tablet by mouth every night at bedtime.

## 2018-10-10 DIAGNOSIS — I10 ESSENTIAL HYPERTENSION: ICD-10-CM

## 2018-10-10 RX ORDER — AMLODIPINE BESYLATE 10 MG/1
10 TABLET ORAL DAILY
Qty: 30 TABLET | Refills: 5 | OUTPATIENT
Start: 2018-10-10

## 2018-10-29 DIAGNOSIS — M54.50 CHRONIC LEFT-SIDED LOW BACK PAIN WITHOUT SCIATICA: ICD-10-CM

## 2018-10-29 DIAGNOSIS — G89.29 CHRONIC LEFT-SIDED LOW BACK PAIN WITHOUT SCIATICA: ICD-10-CM

## 2018-10-29 RX ORDER — HYDROCODONE BITARTRATE AND ACETAMINOPHEN 7.5; 325 MG/1; MG/1
1 TABLET ORAL 2 TIMES DAILY PRN
Qty: 60 TABLET | Refills: 0 | Status: SHIPPED | OUTPATIENT
Start: 2018-10-29 | End: 2018-11-29 | Stop reason: SDUPTHER

## 2018-10-29 NOTE — TELEPHONE ENCOUNTER
[facility] [place], Quin'Dee Marie, [place]IC[place]  Phone Number: [phone]         Wife called.  Patient was a patient of Dr. Roche.     He is needing refill for Hydrocodone.     Pharmacy- [facility], [place].

## 2018-11-29 ENCOUNTER — OFFICE VISIT (OUTPATIENT)
Dept: FAMILY MEDICINE CLINIC | Facility: CLINIC | Age: 63
End: 2018-11-29

## 2018-11-29 ENCOUNTER — LAB (OUTPATIENT)
Dept: LAB | Facility: OTHER | Age: 63
End: 2018-11-29

## 2018-11-29 VITALS
DIASTOLIC BLOOD PRESSURE: 74 MMHG | BODY MASS INDEX: 22.66 KG/M2 | SYSTOLIC BLOOD PRESSURE: 122 MMHG | TEMPERATURE: 98.1 F | WEIGHT: 153 LBS | HEIGHT: 69 IN | HEART RATE: 85 BPM | OXYGEN SATURATION: 98 % | RESPIRATION RATE: 16 BRPM

## 2018-11-29 DIAGNOSIS — M54.50 CHRONIC MIDLINE LOW BACK PAIN WITHOUT SCIATICA: Chronic | ICD-10-CM

## 2018-11-29 DIAGNOSIS — J30.89 CHRONIC NONSEASONAL ALLERGIC RHINITIS DUE TO POLLEN: ICD-10-CM

## 2018-11-29 DIAGNOSIS — M54.50 CHRONIC LEFT-SIDED LOW BACK PAIN WITHOUT SCIATICA: ICD-10-CM

## 2018-11-29 DIAGNOSIS — E78.5 DYSLIPIDEMIA: ICD-10-CM

## 2018-11-29 DIAGNOSIS — G89.29 CHRONIC MIDLINE LOW BACK PAIN WITHOUT SCIATICA: Chronic | ICD-10-CM

## 2018-11-29 DIAGNOSIS — M62.838 MUSCLE SPASM: ICD-10-CM

## 2018-11-29 DIAGNOSIS — J44.9 CHRONIC OBSTRUCTIVE PULMONARY DISEASE, UNSPECIFIED COPD TYPE (HCC): ICD-10-CM

## 2018-11-29 DIAGNOSIS — D75.1 POLYCYTHEMIA: ICD-10-CM

## 2018-11-29 DIAGNOSIS — Z79.899 ENCOUNTER FOR LONG-TERM CURRENT USE OF MEDICATION: ICD-10-CM

## 2018-11-29 DIAGNOSIS — I10 ESSENTIAL HYPERTENSION: Primary | ICD-10-CM

## 2018-11-29 DIAGNOSIS — M54.50 CHRONIC MIDLINE LOW BACK PAIN WITHOUT SCIATICA: ICD-10-CM

## 2018-11-29 DIAGNOSIS — Z79.891 ENCOUNTER FOR LONG-TERM USE OF OPIATE ANALGESIC: ICD-10-CM

## 2018-11-29 DIAGNOSIS — G89.29 CHRONIC LEFT-SIDED LOW BACK PAIN WITHOUT SCIATICA: ICD-10-CM

## 2018-11-29 DIAGNOSIS — Z79.899 ENCOUNTER FOR LONG-TERM CURRENT USE OF MEDICATION: Chronic | ICD-10-CM

## 2018-11-29 DIAGNOSIS — R73.9 HYPERGLYCEMIA: ICD-10-CM

## 2018-11-29 DIAGNOSIS — R93.7 ABNORMAL X-RAY OF LUMBAR SPINE: ICD-10-CM

## 2018-11-29 DIAGNOSIS — D75.1 POLYCYTHEMIA: Chronic | ICD-10-CM

## 2018-11-29 DIAGNOSIS — Z79.891 ENCOUNTER FOR LONG-TERM USE OF OPIATE ANALGESIC: Chronic | ICD-10-CM

## 2018-11-29 DIAGNOSIS — J60 COALWORKER'S PNEUMOCONIOSIS (HCC): ICD-10-CM

## 2018-11-29 DIAGNOSIS — I10 ESSENTIAL HYPERTENSION: Chronic | ICD-10-CM

## 2018-11-29 DIAGNOSIS — G89.29 CHRONIC MIDLINE LOW BACK PAIN WITHOUT SCIATICA: ICD-10-CM

## 2018-11-29 PROBLEM — M47.9 OSTEOARTHRITIS OF SPINE: Chronic | Status: ACTIVE | Noted: 2018-04-02

## 2018-11-29 LAB
ALBUMIN SERPL-MCNC: 5.1 G/DL (ref 3.5–5)
ALBUMIN/GLOB SERPL: 1.5 G/DL (ref 1.1–1.8)
ALP SERPL-CCNC: 78 U/L (ref 38–126)
ALT SERPL W P-5'-P-CCNC: 33 U/L
AMPHET+METHAMPHET UR QL: NEGATIVE
ANION GAP SERPL CALCULATED.3IONS-SCNC: 6 MMOL/L (ref 5–15)
AST SERPL-CCNC: 37 U/L (ref 17–59)
BARBITURATES UR QL SCN: NEGATIVE
BASOPHILS # BLD AUTO: 0.02 10*3/MM3 (ref 0–0.2)
BASOPHILS NFR BLD AUTO: 0.2 % (ref 0–2)
BENZODIAZ UR QL SCN: NEGATIVE
BILIRUB SERPL-MCNC: 0.9 MG/DL (ref 0.2–1.3)
BUN BLD-MCNC: 7 MG/DL (ref 9–20)
BUN/CREAT SERPL: 8.9 (ref 7–25)
CALCIUM SPEC-SCNC: 9.8 MG/DL (ref 8.4–10.2)
CANNABINOIDS SERPL QL: NEGATIVE
CHLORIDE SERPL-SCNC: 101 MMOL/L (ref 98–107)
CO2 SERPL-SCNC: 32 MMOL/L (ref 22–30)
COCAINE UR QL: NEGATIVE
CREAT BLD-MCNC: 0.79 MG/DL (ref 0.66–1.25)
DEPRECATED RDW RBC AUTO: 47.2 FL (ref 35.1–43.9)
EOSINOPHIL # BLD AUTO: 0.15 10*3/MM3 (ref 0–0.7)
EOSINOPHIL NFR BLD AUTO: 1.6 % (ref 0–7)
ERYTHROCYTE [DISTWIDTH] IN BLOOD BY AUTOMATED COUNT: 13.6 % (ref 11.5–14.5)
GFR SERPL CREATININE-BSD FRML MDRD: 100 ML/MIN/1.73 (ref 49–113)
GLOBULIN UR ELPH-MCNC: 3.3 GM/DL (ref 2.3–3.5)
GLUCOSE BLD-MCNC: 116 MG/DL (ref 74–99)
HBA1C MFR BLD: 5.6 % (ref 4–5.6)
HCT VFR BLD AUTO: 53.2 % (ref 39–49)
HGB BLD-MCNC: 18 G/DL (ref 13.7–17.3)
LYMPHOCYTES # BLD AUTO: 1.35 10*3/MM3 (ref 0.6–4.2)
LYMPHOCYTES NFR BLD AUTO: 14.2 % (ref 10–50)
MCH RBC QN AUTO: 32.4 PG (ref 26.5–34)
MCHC RBC AUTO-ENTMCNC: 33.8 G/DL (ref 31.5–36.3)
MCV RBC AUTO: 95.7 FL (ref 80–98)
METHADONE UR QL SCN: NEGATIVE
MONOCYTES # BLD AUTO: 1.01 10*3/MM3 (ref 0–0.9)
MONOCYTES NFR BLD AUTO: 10.6 % (ref 0–12)
NEUTROPHILS # BLD AUTO: 6.98 10*3/MM3 (ref 2–8.6)
NEUTROPHILS NFR BLD AUTO: 73.4 % (ref 37–80)
OPIATES UR QL: POSITIVE
OXYCODONE UR QL SCN: NEGATIVE
PLATELET # BLD AUTO: 317 10*3/MM3 (ref 150–450)
PMV BLD AUTO: 9.6 FL (ref 8–12)
POTASSIUM BLD-SCNC: 4.4 MMOL/L (ref 3.4–5)
PROT SERPL-MCNC: 8.4 G/DL (ref 6.3–8.2)
RBC # BLD AUTO: 5.56 10*6/MM3 (ref 4.37–5.74)
SODIUM BLD-SCNC: 139 MMOL/L (ref 137–145)
TSH SERPL DL<=0.05 MIU/L-ACNC: 0.99 MIU/ML (ref 0.46–4.68)
WBC NRBC COR # BLD: 9.51 10*3/MM3 (ref 3.2–9.8)

## 2018-11-29 PROCEDURE — 80307 DRUG TEST PRSMV CHEM ANLYZR: CPT | Performed by: NURSE PRACTITIONER

## 2018-11-29 PROCEDURE — 99214 OFFICE O/P EST MOD 30 MIN: CPT | Performed by: NURSE PRACTITIONER

## 2018-11-29 PROCEDURE — 83036 HEMOGLOBIN GLYCOSYLATED A1C: CPT | Performed by: NURSE PRACTITIONER

## 2018-11-29 PROCEDURE — 85025 COMPLETE CBC W/AUTO DIFF WBC: CPT | Performed by: NURSE PRACTITIONER

## 2018-11-29 PROCEDURE — 36415 COLL VENOUS BLD VENIPUNCTURE: CPT | Performed by: NURSE PRACTITIONER

## 2018-11-29 PROCEDURE — 80053 COMPREHEN METABOLIC PANEL: CPT | Performed by: NURSE PRACTITIONER

## 2018-11-29 PROCEDURE — 84443 ASSAY THYROID STIM HORMONE: CPT | Performed by: NURSE PRACTITIONER

## 2018-11-29 RX ORDER — MONTELUKAST SODIUM 10 MG/1
10 TABLET ORAL NIGHTLY
Qty: 30 TABLET | Refills: 5 | Status: SHIPPED | OUTPATIENT
Start: 2018-11-29 | End: 2019-10-29 | Stop reason: SDUPTHER

## 2018-11-29 RX ORDER — PRAVASTATIN SODIUM 20 MG
20 TABLET ORAL
Qty: 30 TABLET | Refills: 5 | Status: SHIPPED | OUTPATIENT
Start: 2018-11-29 | End: 2019-05-17 | Stop reason: SDUPTHER

## 2018-11-29 RX ORDER — AMLODIPINE BESYLATE 10 MG/1
10 TABLET ORAL DAILY
Qty: 30 TABLET | Refills: 5 | Status: SHIPPED | OUTPATIENT
Start: 2018-11-29 | End: 2019-05-17 | Stop reason: SDUPTHER

## 2018-11-29 RX ORDER — TIZANIDINE 2 MG/1
2 TABLET ORAL EVERY 8 HOURS PRN
Qty: 30 TABLET | Refills: 5 | Status: SHIPPED | OUTPATIENT
Start: 2018-11-29 | End: 2019-06-28 | Stop reason: SDUPTHER

## 2018-11-29 RX ORDER — CLONIDINE HYDROCHLORIDE 0.1 MG/1
0.1 TABLET ORAL 2 TIMES DAILY
Qty: 60 TABLET | Refills: 5 | Status: SHIPPED | OUTPATIENT
Start: 2018-11-29

## 2018-11-29 RX ORDER — HYDROCODONE BITARTRATE AND ACETAMINOPHEN 7.5; 325 MG/1; MG/1
1 TABLET ORAL 2 TIMES DAILY PRN
Qty: 60 TABLET | Refills: 0 | Status: SHIPPED | OUTPATIENT
Start: 2018-11-29 | End: 2018-12-27 | Stop reason: SDUPTHER

## 2018-11-29 NOTE — PROGRESS NOTES
Chief Complaint   Patient presents with   • [facility]     Controls medicine   • Med Refill     Subjective   Derrell Bynum is a 63 y.o. male who presents to the office for chronic lumbar pain management, and to establish care. Previously seen by Dr Roche and Malathi Brock.   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 His last lumbar xray on file here shows DDD with facet and endplate degeneration of lumbar spine.  I see no MRI results on file, would like to repeat the plain film and pursue the MRI.  He has COPD and [facility] some unknown degree of [facility] lung as well. He sees a pulmonologist there every three months. He has never been to physical therapy for his back pain. He has tried and failed Duexis (Nsaid with famotidine) and muscle relaxers.  He is on disability long term due to combination of comorbidities includine COPD, Black Lung, and his lumbago.  Is due for UDS today as well as other labs. Orders placed.    The following portions of the patient's history were reviewed and updated as appropriate: allergies, current medications, past family history, past medical history, past social history, past surgical history and problem list.    History of Present Illness     Past Medical History:   Diagnosis Date   • Acute bacterial sinusitis    • Acute pharyngitis    • Acute sinusitis    • Allergic rhinitis    • Chronic obstructive pulmonary disease with acute lower respiratory infection (CMS/HCC)    • Chronic sinusitis    • Coalworker's pneumoconiosis (CMS/HCC)    • Contact dermatitis    • COPD (chronic obstructive pulmonary disease) (CMS/HCC)    • Cough    • Dyspnea    • Encounter for immunization    • Erythrocytosis    • Essential hypertension    • Fever    • Hemoptysis    • Hoarse    • Hyperlipidemia    • Insect bite, nonvenomous of shoulder and upper arm, infected    • Malaise and fatigue     Other   • Need  "for immunization against influenza    • Osteoarthritis     Chronic daily pain   • Smokes tobacco daily    • Tobacco dependence syndrome    • Viral gastroenteritis 10/10/2013    Resolved   • Wheezing           Family History   Problem Relation Age of Onset   • Colon cancer Father         Colorectal Cancer   • Diabetes Father         Review of Systems   Constitutional: Negative for activity change, chills, fatigue and fever.   HENT: Negative for congestion, rhinorrhea, sinus pressure and sore throat.    Eyes: Negative for pain, discharge, itching and visual disturbance.   Respiratory: Positive for cough, shortness of breath and wheezing.         Chronic cough from COPD   Cardiovascular: Negative for chest pain, palpitations and leg swelling.   Gastrointestinal: Negative for abdominal pain, blood in stool, constipation, diarrhea, nausea and vomiting.   Endocrine: Negative for polydipsia and polyuria.   Genitourinary: Negative for dysuria, flank pain, frequency, hematuria and urgency.   Musculoskeletal: Positive for arthralgias, back pain and myalgias. Negative for gait problem.        Chronic arthritis   Skin: Negative for rash.   Neurological: Positive for weakness and numbness. Negative for dizziness, tremors, seizures, syncope and headaches.   Hematological: Negative for adenopathy. Does not bruise/bleed easily.   Psychiatric/Behavioral: Negative for confusion, dysphoric mood, sleep disturbance and suicidal ideas. The patient is not nervous/anxious.        Objective   Vitals:    11/29/18 0843   BP: 122/74   BP Location: Left arm   Patient Position: Sitting   Cuff Size: Adult   Pulse: 85   Resp: 16   Temp: 98.1 °F (36.7 °C)   TempSrc: Oral   SpO2: 98%   Weight: 69.4 kg (153 lb)   Height: 175.3 cm (69\")   PainSc:   8   PainLoc: Back     Physical Exam   Constitutional: He is oriented to person, place, and time. He appears well-developed and well-nourished. No distress.   HENT:   Head: Normocephalic and atraumatic. "   Right Ear: External ear normal.   Left Ear: External ear normal.   Nose: Nose normal.   Mouth/Throat: Oropharynx is clear and moist. No oropharyngeal exudate.   Eyes: Conjunctivae and EOM are normal. Pupils are equal, round, and reactive to light. Right eye exhibits no discharge. Left eye exhibits no discharge. No scleral icterus.   Neck: Normal range of motion. Neck supple. No JVD present. No tracheal deviation present. No thyromegaly present.   Cardiovascular: Normal rate, regular rhythm, normal heart sounds and intact distal pulses. Exam reveals no gallop and no friction rub.   No murmur heard.  Pulmonary/Chest: Effort normal. No accessory muscle usage. No tachypnea. No respiratory distress. He has decreased breath sounds in the right lower field and the left lower field. He has wheezes in the right upper field and the left upper field. He has no rhonchi. He has no rales. He exhibits no tenderness.   Abdominal: Soft. Bowel sounds are normal. He exhibits no mass. There is no tenderness. There is no rebound and no guarding.   Musculoskeletal: He exhibits no edema or deformity.        Lumbar back: He exhibits decreased range of motion, tenderness and pain.        Back:    Lymphadenopathy:     He has no cervical adenopathy.   Neurological: He is alert and oriented to person, place, and time. He has normal reflexes. No cranial nerve deficit. He exhibits normal muscle tone.   Skin: Skin is warm and dry. Capillary refill takes 2 to 3 seconds. No rash noted. He is not diaphoretic. No erythema. No pallor.   Psychiatric: He has a normal mood and affect. His behavior is normal. Judgment and thought content normal.   Nursing note and vitals reviewed.      Assessment/Plan   Derrell was seen today for establish care and med refill.    Diagnoses and all orders for this visit:    Essential hypertension  -     CBC & Differential  -     Comprehensive Metabolic Panel  -     TSH  -     amLODIPine (NORVASC) 10 MG tablet; Take 1  tablet by mouth Daily.  -     CloNIDine (CATAPRES) 0.1 MG tablet; Take 1 tablet by mouth 2 (Two) Times a Day. If needed for B/P 150/90 or higher.  -     CBC Auto Differential    Chronic midline low back pain without sciatica  -     XR Spine Lumbar 2 or 3 View; Future  -     HYDROcodone-acetaminophen (LORTAB) 7.5-325 MG per tablet; Take 1 tablet by mouth 2 (Two) Times a Day As Needed for Moderate Pain .  -     Cancel: MRI Lumbar Spine With & Without Contrast; Future  -     MRI Lumbar Spine Without Contrast; Future    Encounter for long-term current use of medication  -     Urine Drug Screen - Urine, Clean Catch; Future  -     Comprehensive Metabolic Panel  -     HYDROcodone-acetaminophen (LORTAB) 7.5-325 MG per tablet; Take 1 tablet by mouth 2 (Two) Times a Day As Needed for Moderate Pain .    Polycythemia  -     CBC & Differential  -     CBC Auto Differential    Encounter for long-term use of opiate analgesic  -     Urine Drug Screen - Urine, Clean Catch; Future    Chronic left-sided low back pain without sciatica  -     Cancel: MRI Lumbar Spine With & Without Contrast; Future    Chronic obstructive pulmonary disease, unspecified COPD type (CMS/HCC)  -     CBC & Differential  -     CBC Auto Differential    Coalworker's pneumoconiosis (CMS/HCC)  -     CBC & Differential  -     CBC Auto Differential    Dyslipidemia  -     pravastatin (PRAVACHOL) 20 MG tablet; Take 1 tablet by mouth every night at bedtime.    Muscle spasm  -     tiZANidine (ZANAFLEX) 2 MG tablet; Take 1 tablet by mouth Every 8 (Eight) Hours As Needed for Muscle Spasms.    Abnormal x-ray of lumbar spine  -     XR Spine Lumbar 2 or 3 View; Future  -     MRI Lumbar Spine Without Contrast; Future    Chronic nonseasonal allergic rhinitis due to pollen  -     CBC & Differential  -     montelukast (SINGULAIR) 10 MG tablet; Take 1 tablet by mouth Every Night.    Hyperglycemia  -     Comprehensive Metabolic Panel  -     Hemoglobin A1c           PHQ-2/PHQ-9  Depression Screening 11/29/2018   Little interest or pleasure in doing things 0   Feeling down, depressed, or hopeless 0   Total Score 0   Patient understands the risks associated with this controlled medication, including tolerance and addiction.  Patient also agrees to only obtain this medication from me, and not from a another provider, unless that provider is covering for me in my absence.  Patient also agrees to be compliant in dosing, and not self adjust the dose of medication.  A signed controlled substance agreement is on file, and the patient has received a controlled substance education sheet at this a previous visit.  The patient has also signed a consent for treatment with a controlled substance as per [facility] policy. TERRY was obtained.    JASMINE Noel         Return in about 4 weeks (around 12/27/2018).    There are no Patient Instructions on file for this visit.    No visits with results within 4 Month(s) from this visit.   Latest known visit with results is:   Lab on 05/02/2018   Component Date Value Ref Range Status   • Report Summary 05/02/2018 FINAL   Final    Comment: ====================================================================  TOXASSURE SELECT 13 KHOI-DIS  ====================================================================  Test                             Result       Flag       Units  Drug Present    Alcohol, Ethyl                 0.063                   g/dL     Sources of ethyl alcohol include alcoholic beverages or as a     fermentation product of glucose; glucose was not detected in this     specimen. Ethyl alcohol result should be interpreted in the     context of all available clinical and behavioral information.    Hydrocodone                    1436                    ng/mg creat    Hydromorphone                  407                     ng/mg creat    Dihydrocodeine                 166                     ng/mg creat    Norhydrocodone                 467                      ng/mg creat     Sources of hydrocodone include scheduled prescription     medications. Hydromorphone, dihydrocodeine and norhydrocodone are     expected                            metabolites of hydrocodone. Hydromorphone and     dihydrocodeine are also available as scheduled prescription     medications.  ====================================================================  Test                      Result    Flag   Units      Ref Range    Creatinine              76               mg/dL      >=20  ====================================================================  Declared Medications:   Medication list was not provided.  ====================================================================  For clinical consultation, please call [phone].  ====================================================================   • Ethanol Screen Urine (Ref) 05/02/2018 +POSITIVE+   Final   • Ethanol, Urine 05/02/2018 0.063  g/dL Final   • Amphetamine, Urine Qual 05/02/2018 Negative   Final   • Methamphetamine, Urine 05/02/2018 Not Detected  ng/mg creat Final   • Amphetamine, Urine 05/02/2018 Not Detected  ng/mg creat Final   • MDMA URINE 05/02/2018 Not Detected  ng/mg creat Final   • MDA 05/02/2018 Not Detected  ng/mg creat Final   • Benzodiazepine Screen, Urine 05/02/2018 Negative   Final   • DIAZEPAM URINE QUANT (REF) 05/02/2018 Not Detected  ng/mg creat Final   • Desmethyldiazepam 05/02/2018 Not Detected  ng/mg creat Final   • Oxazepam, urine 05/02/2018 Not Detected  ng/mg creat Final   • Temazepam, urine 05/02/2018 Not Detected  ng/mg creat Final    Expected metabolism of benzodiazepine class drugs:   Parent Drug       Detected Metabolites   -----------       --------------------   Diazepam:         Desmethyldiazepam, Temazepam, Oxazepam   Chlordiazepoxide: Desmethyldiazepam, Oxazepam   Clorazepate:      Desmethyldiazepam, Oxazepam   Halazepam:        Desmethyldiazepam, Oxazepam   Temazepam:        Oxazepam    Oxazepam:         None   • ALPRAZOLAM 05/02/2018 Not Detected  ng/mg creat Final   • ALPHA-HYDROXYALPRAZOLAM UR, QT (RE* 05/02/2018 Not Detected  ng/mg creat Final   • DESALKLFLURAZEPAM UR QUANT (REF) 05/02/2018 Not Detected  ng/mg creat Final   • LORAZEPAM URINE QUANT (REF) 05/02/2018 Not Detected  ng/mg creat Final   • Alpha-hydroxytriazolam, Urine 05/02/2018 Not Detected  ng/mg creat Final   • Clonazepam Urine 05/02/2018 Not Detected  ng/mg creat Final   • 7-Aminoclonazepam Urine 05/02/2018 Not Detected  ng/mg creat Final   • MIDAZOLAM UR, QUANT (REF) 05/02/2018 Not Detected  ng/mg creat Final   • Cocaine & Metabolite 05/02/2018 Negative   Final   • Cocaine Screen, Urine 05/02/2018 Not Detected  ng/mg creat Final   • Benzoylecgonine, Urine 05/02/2018 Not Detected  ng/mg creat Final   • Cocaethylene Ur 05/02/2018 Not Detected  ng/mg creat Final   • THC, Urine 05/02/2018 Negative   Final   • Carboxy THC, Urine 05/02/2018 Not Detected  ng/mg creat Final   • Opiate Class 05/02/2018 +POSITIVE+   Final   • Codeine, Urine 05/02/2018 Not Detected  ng/mg creat Final   • Morphine, Urine 05/02/2018 Not Detected  ng/mg creat Final   • Norcodeine Ur 05/02/2018 Not Detected  ng/mg creat Final   • Hydrocodone UR 05/02/2018 1436  ng/mg creat Final   • Hydromorphone Urine 05/02/2018 407  ng/mg creat Final   • Dihydrocodeine, Urine 05/02/2018 166  ng/mg creat Final   • Opiates, Norhydrocodone, Urine 05/02/2018 467  ng/mg creat Final    Expected metabolism of opiate class drugs:   Parent Drug       Detected Metabolites   -----------       --------------------   Codeine:          Major:  Morphine, Norcodeine                     Minor:  Hydrocodone, Hydromorphone,                             Dihydrocodeine, Norhydrocodone   Morphine:         Minor:  Hydromorphone   Hydrocodone:      Hydromorphone, Dihydrocodeine,                     Norhydrocodone   Hydromorphone:    None   Dihydrocodeine:   None   Heroin:           6-Acetylmorphine  (if included),                     Morphine                     Codeine, in small amounts in comparison                      to morphine, is often detected when                      heroin is the source drug.   • Oxycodone Class Ur 05/02/2018 Negative   Final   • Oxycodone, Confirmation, Urine 05/02/2018 Not Detected  ng/mg creat Final   • Oxymorphone UR 05/02/2018 Not Detected  ng/mg creat Final   • Opiates, Noroxycodone, Urine 05/02/2018 Not Detected  ng/mg creat Final   • Noroxymorphone 05/02/2018 Not Detected  ng/mg creat Final    Expected metabolism of oxycodone class drugs:   Parent Drug       Detected Metabolites   -----------       --------------------   Oxycodone:        Oxymorphone, Noroxycodone, Noroxymorphone   Oxymorphone:      Noroxymorphone   • Methadone 05/02/2018 Negative   Final   • Methadone, Quantitative 05/02/2018 Not Detected  ng/mg creat Final   • EDDP 05/02/2018 Not Detected  ng/mg creat Final   • Fentanyl and Analogues, Ur 05/02/2018 Negative   Final   • Fentanyl, Urine 05/02/2018 Not Detected  ng/mg creat Final   • Norfentanyl Urine 05/02/2018 Not Detected  ng/mg creat Final   • Sufentanil, Ur 05/02/2018 Not Detected  ng/mg creat Final   • Alfentanil, Ur 05/02/2018 Not Detected  ng/mg creat Final   • BUPRENORPHINE 05/02/2018 Negative   Final   • Buprenorphine, Urine 05/02/2018 Not Detected  ng/mg creat Final   • Norbuprenorphine 05/02/2018 Not Detected  ng/mg creat Final   • BARBITURATES, URINE 05/02/2018 Negative   Final   • Amobarbital, Urine 05/02/2018 Not Detected   Final   • Barbital 05/02/2018 Not Detected   Final   • Butabarbital, Ur 05/02/2018 Not Detected   Final   • Butalbital Screen, Urine 05/02/2018 Not Detected   Final   • Mephobarbital Urine 05/02/2018 Not Detected   Final   • Pentobarbital UR 05/02/2018 Not Detected   Final   • Phenobarbital 05/02/2018 Not Detected   Final   • Secobarbital, urine 05/02/2018 Not Detected   Final   • Thiopental, Ur 05/02/2018 Not Detected    Final   • Tapentadol 05/02/2018 Negative   Final   • Tapentadol Ur 05/02/2018 Not Detected  ng/mg creat Final   • Opoidss other, UR 05/02/2018 Negative   Final   • Tramadol, Urine 05/02/2018 Not Detected   Final   • O-desmethyltramadol, Ur 05/02/2018 Not Detected   Final   • N-Desmethyltramadol, U 05/02/2018 Not Detected   Final   • Creatinine, Urine 05/02/2018 76  mg/dL Final    REFERENCE RANGE: Ref Range>=20   • Declared Medications: 05/02/2018 Comment   Final    Not Provided  Medication list was not provided.   • Level of Detection: 05/02/2018 Comment   Final    Level of detection:  TESTING THRESHOLDS ARE AS FOLLOWS:  AMPHETAMINES: 50 ng/mL  BENZODIAZEPINES: 50 ng/mL  COCAINE / METABOLITE: 50 ng/mL  ALCOHOL, ETHYL: 0.020 gm/dL  FENTANYL / ANALOGUES: fentanyl-1.0 ng/mL, others-5.0 ng/mL  BUPRENORPHINE: buprenorphine-1.0 ng/mL,                 norbuprenorphine-5 ng/mL  TAPENTADOL: 50 ng/mL  CANNABINOIDS: total-20 ng/mL, carboxy-THC-2 ng/mL  METHADONE: 50 ng/mL  OPIATE CLASS: 50 ng/mL  OXYCODONE CLASS: 50 ng/mL  BARBITURATES: 200 ng/mL  TRAMADOL: 50 ng/mL  This test was developed and its performance characteristics  determined by [facility].  It has not been cleared or approved  by the Food and Drug Administration.   ]

## 2018-11-30 ENCOUNTER — TELEPHONE (OUTPATIENT)
Dept: GENERAL RADIOLOGY | Facility: CLINIC | Age: 63
End: 2018-11-30

## 2018-11-30 DIAGNOSIS — I70.0 CALCIFICATION OF AORTA (HCC): Primary | ICD-10-CM

## 2018-11-30 NOTE — TELEPHONE ENCOUNTER
----- Message from JASMINE Leal sent at 11/29/2018  4:11 PM CST -----  Please notify pt that the lumbar xray shows degenerative changes of lumbar vertebrae with multilevel bone spurs. The greatest loss of disc space is in the lower lumbar spine.  These findings are consistent with degenerative disc disease of the lumbar spine and are likely cause of his pain.  Incidentally there was a calcified nodule seen in the base of the right lung, likey an old scar from a previous infection. There is also calcification of the aorta, common with atherosclerosis. (Hardening of arteries).  Thanks clw

## 2018-11-30 NOTE — TELEPHONE ENCOUNTER
Please notify pt that to be safe, due to the calcification seen of his Aorta on the lumbar xray, it is only reasonable care for us to get an ultrasound of the abdomen to assess that the Aorta has no significant obstruction imposed by the hardening seen, which could result in injury or cardiac events in the future.  I am ordering an ultrasound for that purpose. They will call to schedule it. This is for his safety and ongoing health. Thanks preston

## 2018-12-27 ENCOUNTER — OFFICE VISIT (OUTPATIENT)
Dept: FAMILY MEDICINE CLINIC | Facility: CLINIC | Age: 63
End: 2018-12-27

## 2018-12-27 VITALS
TEMPERATURE: 98.2 F | RESPIRATION RATE: 16 BRPM | DIASTOLIC BLOOD PRESSURE: 70 MMHG | SYSTOLIC BLOOD PRESSURE: 130 MMHG | WEIGHT: 156 LBS | HEIGHT: 69 IN | OXYGEN SATURATION: 98 % | HEART RATE: 93 BPM | BODY MASS INDEX: 23.11 KG/M2

## 2018-12-27 DIAGNOSIS — Z79.891 ENCOUNTER FOR LONG-TERM USE OF OPIATE ANALGESIC: Chronic | ICD-10-CM

## 2018-12-27 DIAGNOSIS — R93.7 ABNORMAL X-RAY OF LUMBAR SPINE: Chronic | ICD-10-CM

## 2018-12-27 DIAGNOSIS — G89.29 CHRONIC MIDLINE LOW BACK PAIN WITHOUT SCIATICA: Primary | Chronic | ICD-10-CM

## 2018-12-27 DIAGNOSIS — M47.816 SPONDYLOSIS OF LUMBAR REGION WITHOUT MYELOPATHY OR RADICULOPATHY: Chronic | ICD-10-CM

## 2018-12-27 DIAGNOSIS — M54.50 CHRONIC MIDLINE LOW BACK PAIN WITHOUT SCIATICA: Primary | Chronic | ICD-10-CM

## 2018-12-27 DIAGNOSIS — Z79.899 ENCOUNTER FOR LONG-TERM CURRENT USE OF MEDICATION: ICD-10-CM

## 2018-12-27 DIAGNOSIS — M62.838 MUSCLE SPASM: Chronic | ICD-10-CM

## 2018-12-27 PROCEDURE — 99214 OFFICE O/P EST MOD 30 MIN: CPT | Performed by: NURSE PRACTITIONER

## 2018-12-27 RX ORDER — HYDROCODONE BITARTRATE AND ACETAMINOPHEN 7.5; 325 MG/1; MG/1
1 TABLET ORAL EVERY 8 HOURS PRN
Qty: 90 TABLET | Refills: 0 | Status: SHIPPED | OUTPATIENT
Start: 2018-12-27 | End: 2019-01-24 | Stop reason: SDUPTHER

## 2018-12-27 NOTE — PROGRESS NOTES
Chief Complaint   Patient presents with   • Follow-up     4 week follow-up   • Med Refill     Subjective   Derrell Bynum is a 63 y.o. male who presents to the office for management of chronic lower back pain. Some days the two lortab daily are controlling his pain but with the cold weather, his pain is worse and he is in need of a third pain pill about bedtime to be able to control pain until morning a little better. First tablet taken upon waking and second about noon. He is especially sore by morning and still.  No increase in dose over past 3 years. I will do so today and see if this helps. He does have significant DDD of lumbar spine by xray, and MRI was done at [facility] in late November but I do not have those results. Will send for these again today. Follow up in 4 weeks.     The following portions of the patient's history were reviewed and updated as appropriate: allergies, current medications, past family history, past medical history, past social history, past surgical history and problem list.    History of Present Illness     Past Medical History:   Diagnosis Date   • Acute bacterial sinusitis    • Acute pharyngitis    • Acute sinusitis    • Allergic rhinitis    • Chronic obstructive pulmonary disease with acute lower respiratory infection (CMS/HCC)    • Chronic sinusitis    • Coalworker's pneumoconiosis (CMS/HCC)    • Contact dermatitis    • COPD (chronic obstructive pulmonary disease) (CMS/HCC)    • Cough    • Dyspnea    • Encounter for immunization    • Erythrocytosis    • Essential hypertension    • Fever    • Hemoptysis    • Hoarse    • Hyperlipidemia    • Insect bite, nonvenomous of shoulder and upper arm, infected    • Malaise and fatigue     Other   • Need for immunization against influenza    • Osteoarthritis     Chronic daily pain   • Smokes tobacco daily    • Tobacco dependence syndrome    • Viral gastroenteritis 10/10/2013    Resolved   • Wheezing           Family History   Problem  "Relation Age of Onset   • Colon cancer Father         Colorectal Cancer   • Diabetes Father         Review of Systems   Constitutional: Negative for activity change, chills, fatigue and fever.   HENT: Negative for congestion, rhinorrhea, sinus pressure and sore throat.    Eyes: Negative for pain, discharge, itching and visual disturbance.   Respiratory: Positive for cough, shortness of breath and wheezing.         Chronic cough from COPD   Cardiovascular: Negative for chest pain, palpitations and leg swelling.   Gastrointestinal: Negative for abdominal pain, blood in stool, constipation, diarrhea, nausea and vomiting.   Endocrine: Negative for polydipsia and polyuria.   Genitourinary: Negative for dysuria, flank pain, frequency, hematuria and urgency.   Musculoskeletal: Positive for arthralgias, back pain and myalgias. Negative for gait problem.        Chronic arthritis   Skin: Negative for rash.   Neurological: Positive for weakness and numbness. Negative for dizziness, tremors, seizures, syncope and headaches.   Hematological: Negative for adenopathy. Does not bruise/bleed easily.   Psychiatric/Behavioral: Negative for confusion, dysphoric mood, sleep disturbance and suicidal ideas. The patient is not nervous/anxious.        Objective   Vitals:    12/27/18 0935   BP: 130/70   BP Location: Left arm   Patient Position: Sitting   Cuff Size: Adult   Pulse: 93   Resp: 16   Temp: 98.2 °F (36.8 °C)   TempSrc: Oral   SpO2: 98%   Weight: 70.8 kg (156 lb)   Height: 175.3 cm (69\")   PainSc:   8   PainLoc: Back     Physical Exam   Constitutional: He is oriented to person, place, and time. He appears well-developed and well-nourished. No distress.   HENT:   Head: Normocephalic and atraumatic.   Right Ear: External ear normal.   Left Ear: External ear normal.   Nose: Nose normal.   Mouth/Throat: Oropharynx is clear and moist. No oropharyngeal exudate.   Eyes: Conjunctivae and EOM are normal. Pupils are equal, round, and " reactive to light. Right eye exhibits no discharge. Left eye exhibits no discharge. No scleral icterus.   Neck: Normal range of motion. Neck supple. No JVD present. No tracheal deviation present. No thyromegaly present.   Cardiovascular: Normal rate, regular rhythm, normal heart sounds and intact distal pulses. Exam reveals no gallop and no friction rub.   No murmur heard.  Pulmonary/Chest: Effort normal. No accessory muscle usage. No tachypnea. No respiratory distress. He has decreased breath sounds in the right lower field and the left lower field. He has wheezes in the right upper field and the left upper field. He has no rhonchi. He has no rales. He exhibits no tenderness.   Abdominal: Soft. Bowel sounds are normal. He exhibits no mass. There is no tenderness. There is no rebound and no guarding.   Musculoskeletal: He exhibits no edema or deformity.        Lumbar back: He exhibits decreased range of motion, tenderness and pain.        Back:    Lymphadenopathy:     He has no cervical adenopathy.   Neurological: He is alert and oriented to person, place, and time. He has normal reflexes. No cranial nerve deficit. He exhibits normal muscle tone.   Skin: Skin is warm and dry. Capillary refill takes 2 to 3 seconds. No rash noted. He is not diaphoretic. No erythema. No pallor.   Psychiatric: He has a normal mood and affect. His behavior is normal. Judgment and thought content normal.   Nursing note and vitals reviewed.      Assessment/Plan   Derrell was seen today for follow-up and med refill.    Diagnoses and all orders for this visit:    Chronic midline low back pain without sciatica  -     HYDROcodone-acetaminophen (LORTAB) 7.5-325 MG per tablet; Take 1 tablet by mouth Every 8 (Eight) Hours As Needed for Moderate Pain . Dose increase on 12/27/18    Muscle spasm    Spondylosis of lumbar region without myelopathy or radiculopathy    Abnormal x-ray of lumbar spine    Encounter for long-term use of opiate  analgesic    Encounter for long-term current use of medication  -     HYDROcodone-acetaminophen (LORTAB) 7.5-325 MG per tablet; Take 1 tablet by mouth Every 8 (Eight) Hours As Needed for Moderate Pain . Dose increase on 12/27/18           PHQ-2/PHQ-9 Depression Screening 11/29/2018   Little interest or pleasure in doing things 0   Feeling down, depressed, or hopeless 0   Total Score 0   Patient understands the risks associated with this controlled medication, including tolerance and addiction.  Patient also agrees to only obtain this medication from me, and not from a another provider, unless that provider is covering for me in my absence.  Patient also agrees to be compliant in dosing, and not self adjust the dose of medication.  A signed controlled substance agreement is on file, and the patient has received a controlled substance education sheet at this a previous visit.  The patient has also signed a consent for treatment with a controlled substance as per [facility] policy. TERRY was obtained.    JASMINE Noel         Return in about 4 weeks (around 1/24/2019).    Patient Instructions    I will call with MRI results when received today  addendum:  Lumbar MRI report of 12/6/18 received. Shows :  1. left lateral disc herniation at L3-4 resulting in severe neural foraminal stenosis and effacement of the exiting nerve root on the left at this level.  2. Mild central canal stenosis at L4-5 secondary to a generalized disc bulge, facet hypertrophy and ligamentum flacum hypertrophy.  3. Severe neural foraminal stenosis on the right secondary to a mixed disc protrusion and overlying spur resulting in effacement of the exiting nerve root.     Patient will be notified of severity of findings and I will recommend a referral to neurosurgery, as this may progress at indeterminate speed and he will need an established relationship with the surgeon against that time. pre[place]

## 2018-12-28 DIAGNOSIS — R93.7 ABNORMAL X-RAY OF LUMBAR SPINE: ICD-10-CM

## 2018-12-28 DIAGNOSIS — M54.50 CHRONIC MIDLINE LOW BACK PAIN WITHOUT SCIATICA: ICD-10-CM

## 2018-12-28 DIAGNOSIS — G89.29 CHRONIC MIDLINE LOW BACK PAIN WITHOUT SCIATICA: ICD-10-CM

## 2019-01-24 ENCOUNTER — OFFICE VISIT (OUTPATIENT)
Dept: FAMILY MEDICINE CLINIC | Facility: CLINIC | Age: 64
End: 2019-01-24

## 2019-01-24 VITALS
TEMPERATURE: 97.6 F | SYSTOLIC BLOOD PRESSURE: 126 MMHG | HEIGHT: 69 IN | RESPIRATION RATE: 16 BRPM | HEART RATE: 70 BPM | OXYGEN SATURATION: 99 % | DIASTOLIC BLOOD PRESSURE: 70 MMHG | BODY MASS INDEX: 22.96 KG/M2 | WEIGHT: 155 LBS

## 2019-01-24 DIAGNOSIS — Z79.899 ENCOUNTER FOR LONG-TERM CURRENT USE OF MEDICATION: ICD-10-CM

## 2019-01-24 DIAGNOSIS — G89.29 CHRONIC MIDLINE LOW BACK PAIN WITHOUT SCIATICA: Chronic | ICD-10-CM

## 2019-01-24 DIAGNOSIS — M54.50 CHRONIC MIDLINE LOW BACK PAIN WITHOUT SCIATICA: Chronic | ICD-10-CM

## 2019-01-24 PROCEDURE — 99214 OFFICE O/P EST MOD 30 MIN: CPT | Performed by: NURSE PRACTITIONER

## 2019-01-24 RX ORDER — HYDROCODONE BITARTRATE AND ACETAMINOPHEN 7.5; 325 MG/1; MG/1
1 TABLET ORAL EVERY 8 HOURS PRN
Qty: 90 TABLET | Refills: 0 | Status: SHIPPED | OUTPATIENT
Start: 2019-01-24 | End: 2019-02-21 | Stop reason: SDUPTHER

## 2019-01-24 NOTE — PROGRESS NOTES
Chief Complaint   Patient presents with   • Follow-up     4 week follow-up   • Med Refill     Chief Complaint   Patient presents with   • Follow-up     4 week follow-up   • Med Refill     Subjective   Derrell Bynum is a 64 y.o. male who presents to the office for chronic back pain.    HPI:    chronic lower back pain.  Lumbar MRI report of 12/6/18 received. Shows :  1. left lateral disc herniation at L3-4 resulting in severe neural foraminal stenosis and effacement of the exiting nerve root on the left at this level.  2. Mild central canal stenosis at L4-5 secondary to a generalized disc bulge, facet hypertrophy and ligamentum flacum hypertrophy.  3. Severe neural foraminal stenosis on the right secondary to a mixed disc protrusion and overlying spur resulting in effacement of the exiting nerve root.    Pain is managed better now with three doses of lortab per day. Doesn't wake with the same severity of pain as he has an evening dose available.  He does not wish to pursue neurosurgery consult and discuss surgical options for repair at this time. 10 minutes is given to discussion of symptoms of progressing pressure of the L3/4 right nerve root and the L4-5 left nerve roots and that those symptoms would / will warrant immediate referral to neurosurgeon for treatment.  He elects to wait and watch for symptoms for now. Refill of hydrocodone issued, Follow up in 4 weeks.     The following portions of the patient's history were reviewed and updated as appropriate: allergies, current medications, past family history, past medical history, past social history, past surgical history and problem list.    History of Present Illness     Past Medical History:   Diagnosis Date   • Acute bacterial sinusitis    • Acute pharyngitis    • Acute sinusitis    • Allergic rhinitis    • Chronic obstructive pulmonary disease with acute lower respiratory infection (CMS/HCC)    • Chronic sinusitis    • Coalworker's pneumoconiosis (CMS/HCC)     • Contact dermatitis    • COPD (chronic obstructive pulmonary disease) (CMS/HCC)    • Cough    • Dyspnea    • Encounter for immunization    • Erythrocytosis    • Essential hypertension    • Fever    • Hemoptysis    • Hoarse    • Hyperlipidemia    • Insect bite, nonvenomous of shoulder and upper arm, infected    • Malaise and fatigue     Other   • Need for immunization against influenza    • Osteoarthritis     Chronic daily pain   • Smokes tobacco daily    • Tobacco dependence syndrome    • Viral gastroenteritis 10/10/2013    Resolved   • Wheezing           Family History   Problem Relation Age of Onset   • Colon cancer Father         Colorectal Cancer   • Diabetes Father         Review of Systems   Constitutional: Negative for activity change, chills, fatigue and fever.   HENT: Negative for congestion, rhinorrhea, sinus pressure and sore throat.    Eyes: Negative for pain, discharge, itching and visual disturbance.   Respiratory: Positive for cough, shortness of breath and wheezing.         Chronic cough from COPD   Cardiovascular: Negative for chest pain, palpitations and leg swelling.   Gastrointestinal: Negative for abdominal pain, blood in stool, constipation, diarrhea, nausea and vomiting.   Endocrine: Negative for polydipsia and polyuria.   Genitourinary: Negative for dysuria, flank pain, frequency, hematuria and urgency.   Musculoskeletal: Positive for arthralgias, back pain and myalgias. Negative for gait problem.        Chronic arthritis   Skin: Negative for rash.   Neurological: Positive for weakness and numbness. Negative for dizziness, tremors, seizures, syncope and headaches.   Hematological: Negative for adenopathy. Does not bruise/bleed easily.   Psychiatric/Behavioral: Negative for confusion, dysphoric mood, sleep disturbance and suicidal ideas. The patient is not nervous/anxious.        Objective   Vitals:    01/24/19 0940   BP: 126/70   BP Location: Left arm   Patient Position: Sitting   Cuff  "Size: Adult   Pulse: 70   Resp: 16   Temp: 97.6 °F (36.4 °C)   TempSrc: Oral   SpO2: 99%   Weight: 70.3 kg (155 lb)   Height: 175.3 cm (69\")   PainSc:   6   PainLoc: Back     Physical Exam   Constitutional: He is oriented to person, place, and time. He appears well-developed and well-nourished. No distress.   HENT:   Head: Normocephalic and atraumatic.   Right Ear: External ear normal.   Left Ear: External ear normal.   Nose: Nose normal.   Mouth/Throat: Oropharynx is clear and moist. No oropharyngeal exudate.   Eyes: Conjunctivae and EOM are normal. Pupils are equal, round, and reactive to light. Right eye exhibits no discharge. Left eye exhibits no discharge. No scleral icterus.   Neck: Normal range of motion. Neck supple. No JVD present. No tracheal deviation present. No thyromegaly present.   Cardiovascular: Normal rate, regular rhythm, normal heart sounds and intact distal pulses. Exam reveals no gallop and no friction rub.   No murmur heard.  Pulmonary/Chest: Effort normal. No accessory muscle usage. No tachypnea. No respiratory distress. He has decreased breath sounds in the right lower field and the left lower field. He has wheezes in the right upper field and the left upper field. He has no rhonchi. He has no rales. He exhibits no tenderness.   Abdominal: Soft. Bowel sounds are normal. He exhibits no mass. There is no tenderness. There is no rebound and no guarding.   Musculoskeletal: He exhibits no edema or deformity.        Lumbar back: He exhibits decreased range of motion, tenderness and pain.        Back:    Lymphadenopathy:     He has no cervical adenopathy.   Neurological: He is alert and oriented to person, place, and time. He has normal reflexes. No cranial nerve deficit. He exhibits normal muscle tone.   Skin: Skin is warm and dry. Capillary refill takes 2 to 3 seconds. No rash noted. He is not diaphoretic. No erythema. No pallor.   Psychiatric: He has a normal mood and affect. His behavior is " normal. Judgment and thought content normal.   Nursing note and vitals reviewed.      Assessment/Plan   Derrell was seen today for follow-up and med refill.    Diagnoses and all orders for this visit:    Chronic midline low back pain without sciatica  -     HYDROcodone-acetaminophen (LORTAB) 7.5-325 MG per tablet; Take 1 tablet by mouth Every 8 (Eight) Hours As Needed for Moderate Pain . Dose increase on 12/27/18    Encounter for long-term current use of medication  -     HYDROcodone-acetaminophen (LORTAB) 7.5-325 MG per tablet; Take 1 tablet by mouth Every 8 (Eight) Hours As Needed for Moderate Pain . Dose increase on 12/27/18           PHQ-2/PHQ-9 Depression Screening 11/29/2018   Little interest or pleasure in doing things 0   Feeling down, depressed, or hopeless 0   Total Score 0   Patient understands the risks associated with this controlled medication, including tolerance and addiction.  Patient also agrees to only obtain this medication from me, and not from a another provider, unless that provider is covering for me in my absence.  Patient also agrees to be compliant in dosing, and not self adjust the dose of medication.  A signed controlled substance agreement is on file, and the patient has received a controlled substance education sheet at this a previous visit.  The patient has also signed a consent for treatment with a controlled substance as per [facility] policy. TERRY was obtained.    JASMINE Noel         Return in about 4 weeks (around 2/21/2019).    There are no Patient Instructions on file for this visit.addendum:

## 2019-02-21 ENCOUNTER — OFFICE VISIT (OUTPATIENT)
Dept: FAMILY MEDICINE CLINIC | Facility: CLINIC | Age: 64
End: 2019-02-21

## 2019-02-21 VITALS
DIASTOLIC BLOOD PRESSURE: 80 MMHG | HEIGHT: 69 IN | OXYGEN SATURATION: 98 % | TEMPERATURE: 97.2 F | SYSTOLIC BLOOD PRESSURE: 140 MMHG | RESPIRATION RATE: 16 BRPM | WEIGHT: 159 LBS | HEART RATE: 83 BPM | BODY MASS INDEX: 23.55 KG/M2

## 2019-02-21 DIAGNOSIS — M54.50 CHRONIC MIDLINE LOW BACK PAIN WITHOUT SCIATICA: Primary | Chronic | ICD-10-CM

## 2019-02-21 DIAGNOSIS — D75.1 POLYCYTHEMIA: ICD-10-CM

## 2019-02-21 DIAGNOSIS — D75.1 POLYCYTHEMIA: Primary | Chronic | ICD-10-CM

## 2019-02-21 DIAGNOSIS — G89.29 CHRONIC MIDLINE LOW BACK PAIN WITHOUT SCIATICA: Primary | Chronic | ICD-10-CM

## 2019-02-21 DIAGNOSIS — Z79.899 ENCOUNTER FOR LONG-TERM CURRENT USE OF MEDICATION: ICD-10-CM

## 2019-02-21 LAB
BASOPHILS # BLD AUTO: 0.03 10*3/MM3 (ref 0–0.2)
BASOPHILS NFR BLD AUTO: 0.3 % (ref 0–2)
DEPRECATED RDW RBC AUTO: 47.5 FL (ref 35.1–43.9)
EOSINOPHIL # BLD AUTO: 0.26 10*3/MM3 (ref 0–0.7)
EOSINOPHIL NFR BLD AUTO: 2.5 % (ref 0–7)
ERYTHROCYTE [DISTWIDTH] IN BLOOD BY AUTOMATED COUNT: 14 % (ref 11.5–14.5)
HCT VFR BLD AUTO: 50 % (ref 39–49)
HGB BLD-MCNC: 17.1 G/DL (ref 13.7–17.3)
LYMPHOCYTES # BLD AUTO: 1.21 10*3/MM3 (ref 0.6–4.2)
LYMPHOCYTES NFR BLD AUTO: 11.5 % (ref 10–50)
MCH RBC QN AUTO: 32.3 PG (ref 26.5–34)
MCHC RBC AUTO-ENTMCNC: 34.2 G/DL (ref 31.5–36.3)
MCV RBC AUTO: 94.3 FL (ref 80–98)
MONOCYTES # BLD AUTO: 1.08 10*3/MM3 (ref 0–0.9)
MONOCYTES NFR BLD AUTO: 10.3 % (ref 0–12)
NEUTROPHILS # BLD AUTO: 7.95 10*3/MM3 (ref 2–8.6)
NEUTROPHILS NFR BLD AUTO: 75.4 % (ref 37–80)
PLATELET # BLD AUTO: 303 10*3/MM3 (ref 150–450)
PMV BLD AUTO: 9.1 FL (ref 8–12)
RBC # BLD AUTO: 5.3 10*6/MM3 (ref 4.37–5.74)
WBC NRBC COR # BLD: 10.53 10*3/MM3 (ref 3.2–9.8)

## 2019-02-21 PROCEDURE — 99214 OFFICE O/P EST MOD 30 MIN: CPT | Performed by: NURSE PRACTITIONER

## 2019-02-21 PROCEDURE — 36415 COLL VENOUS BLD VENIPUNCTURE: CPT | Performed by: NURSE PRACTITIONER

## 2019-02-21 PROCEDURE — 85025 COMPLETE CBC W/AUTO DIFF WBC: CPT | Performed by: NURSE PRACTITIONER

## 2019-02-21 RX ORDER — HYDROCODONE BITARTRATE AND ACETAMINOPHEN 7.5; 325 MG/1; MG/1
1 TABLET ORAL EVERY 8 HOURS PRN
Qty: 90 TABLET | Refills: 0 | Status: SHIPPED | OUTPATIENT
Start: 2019-02-21 | End: 2019-02-21 | Stop reason: SDUPTHER

## 2019-02-21 RX ORDER — HYDROCODONE BITARTRATE AND ACETAMINOPHEN 7.5; 325 MG/1; MG/1
1 TABLET ORAL EVERY 8 HOURS PRN
Qty: 90 TABLET | Refills: 0 | Status: SHIPPED | OUTPATIENT
Start: 2019-02-21 | End: 2019-03-21 | Stop reason: SDUPTHER

## 2019-02-21 NOTE — PROGRESS NOTES
"Chief Complaint   Patient presents with   • Follow-up     4 week follow-up   • Med Refill     Subjective   Derrell Bynum is a 64 y.o. male who presents to the office for chronic pain management of lower back pain.     Last UDS 11/29/18, appropriate. Due again by May 2019  Last fasting labs 11/29/18. Hgb was 18. CMP, lipids, A1C and TSH WNL.   Repeat CBC today    The following portions of the patient's history were reviewed and updated as appropriate: allergies, current medications, past family history, past medical history, past social history, past surgical history and problem list.    History of Present Illness   HPI:    chronic lower back pain.  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 Location is midline/ bilateral lower back, radiating down left leg at times. Onset gradual and gradually worsening over the years. Pain is aching. It is steady/ constant, worsens with twisting or lifing. Pain is consistently present through day and night, not worse one or the other. Stiffness is worse in the morning. Denies numbness, tingling or weakness of legs.  There is no loss of bowel / bladder control. Sometimes there is a shooting tingling running down left buttock along outer left leg to foot. Feels \"like by foot is going to sleep\". This radicular pain only occurs intermittently, once every 2-3 weeks, lasting about 10 minutes when it does occur and passes spontaneously.     Lumbar MRI report of 12/6/18  Shows :  1. left lateral disc herniation at L3-4 resulting in severe neural foraminal stenosis and effacement of the exiting nerve root on the left at this level.  2. Mild central canal stenosis at L4-5 secondary to a generalized disc bulge, facet hypertrophy and ligamentum flacum hypertrophy.  3. Severe neural foraminal stenosis on the right secondary to a mixed disc protrusion and overlying spur resulting in effacement of the exiting nerve root.      Pain is managed " better now with three doses of lortab per day. Doesn't wake with the same severity of pain as he has an evening dose available.  He does not wish to pursue neurosurgery consult and discuss surgical options for repair at this time. Again some time is given to discussion of symptoms of progressing pressure of the L3/4 right nerve root and the L4-5 left nerve roots and that those symptoms would / will warrant immediate referral to neurosurgeon for treatment.  He elects to wait and watch for symptoms for now. Refill of hydrocodone issued, Follow up in 4 weeks.        Past Medical History:   Diagnosis Date   • Acute bacterial sinusitis    • Acute pharyngitis    • Acute sinusitis    • Allergic rhinitis    • Chronic obstructive pulmonary disease with acute lower respiratory infection (CMS/HCC)    • Chronic sinusitis    • Coalworker's pneumoconiosis (CMS/HCC)    • Contact dermatitis    • COPD (chronic obstructive pulmonary disease) (CMS/HCC)    • Cough    • Dyspnea    • Encounter for immunization    • Erythrocytosis    • Essential hypertension    • Fever    • Hemoptysis    • Hoarse    • Hyperlipidemia    • Insect bite, nonvenomous of shoulder and upper arm, infected    • Malaise and fatigue     Other   • Need for immunization against influenza    • Osteoarthritis     Chronic daily pain   • Smokes tobacco daily    • Tobacco dependence syndrome    • Viral gastroenteritis 10/10/2013    Resolved   • Wheezing           Family History   Problem Relation Age of Onset   • Colon cancer Father         Colorectal Cancer   • Diabetes Father         Review of Systems   Constitutional: Negative for activity change, chills, fatigue and fever.   HENT: Negative for congestion, rhinorrhea, sinus pressure and sore throat.    Eyes: Negative for pain, discharge, itching and visual disturbance.   Respiratory: Positive for cough, shortness of breath and wheezing.         Chronic cough from COPD   Cardiovascular: Negative for chest pain,  "palpitations and leg swelling.   Gastrointestinal: Negative for abdominal pain, blood in stool, constipation, diarrhea, nausea and vomiting.   Endocrine: Negative for polydipsia and polyuria.   Genitourinary: Negative for dysuria, flank pain, frequency, hematuria and urgency.   Musculoskeletal: Positive for arthralgias, back pain and myalgias. Negative for gait problem.        Chronic arthritis   Skin: Negative for rash.   Neurological: Positive for weakness and numbness. Negative for dizziness, tremors, seizures, syncope and headaches.   Hematological: Negative for adenopathy. Does not bruise/bleed easily.   Psychiatric/Behavioral: Negative for confusion, dysphoric mood, sleep disturbance and suicidal ideas. The patient is not nervous/anxious.        Objective   Vitals:    02/21/19 0940   BP: 140/80   BP Location: Left arm   Patient Position: Sitting   Cuff Size: Adult   Pulse: 83   Resp: 16   Temp: 97.2 °F (36.2 °C)   TempSrc: Oral   SpO2: 98%   Weight: 72.1 kg (159 lb)   Height: 175.3 cm (69\")   PainSc:   8     Physical Exam   Constitutional: He is oriented to person, place, and time. He appears well-developed and well-nourished. No distress.   HENT:   Head: Normocephalic and atraumatic.   Right Ear: External ear normal.   Left Ear: External ear normal.   Nose: Nose normal.   Mouth/Throat: Oropharynx is clear and moist. No oropharyngeal exudate.   Eyes: Conjunctivae and EOM are normal. Pupils are equal, round, and reactive to light. Right eye exhibits no discharge. Left eye exhibits no discharge. No scleral icterus.   Neck: Normal range of motion. Neck supple. No JVD present. No tracheal deviation present. No thyromegaly present.   Cardiovascular: Normal rate, regular rhythm, normal heart sounds and intact distal pulses. Exam reveals no gallop and no friction rub.   No murmur heard.  Pulmonary/Chest: Effort normal. No accessory muscle usage. No tachypnea. No respiratory distress. He has decreased breath sounds in " the right lower field and the left lower field. He has wheezes in the right upper field and the left upper field. He has no rhonchi. He has no rales. He exhibits no tenderness.   Abdominal: Soft. Bowel sounds are normal. He exhibits no mass. There is no tenderness. There is no rebound and no guarding.   Musculoskeletal: He exhibits no edema or deformity.        Lumbar back: He exhibits decreased range of motion, tenderness and pain.        Back:    Lymphadenopathy:     He has no cervical adenopathy.   Neurological: He is alert and oriented to person, place, and time. He has normal reflexes. No cranial nerve deficit. He exhibits normal muscle tone.   Skin: Skin is warm and dry. Capillary refill takes 2 to 3 seconds. No rash noted. He is not diaphoretic. No erythema. No pallor.   Psychiatric: He has a normal mood and affect. His behavior is normal. Judgment and thought content normal.   Nursing note and vitals reviewed.      Assessment/Plan   Derrell was seen today for follow-up and med refill.    Diagnoses and all orders for this visit:    Chronic midline low back pain without sciatica  -     Discontinue: HYDROcodone-acetaminophen (LORTAB) 7.5-325 MG per tablet; Take 1 tablet by mouth Every 8 (Eight) Hours As Needed for Moderate Pain . Fill when due, last filled 1/25/19  -     HYDROcodone-acetaminophen (LORTAB) 7.5-325 MG per tablet; Take 1 tablet by mouth Every 8 (Eight) Hours As Needed for Moderate Pain . Fill when due, last filled 1/25/19    Polycythemia  -     CBC & Differential    Encounter for long-term current use of medication  -     Discontinue: HYDROcodone-acetaminophen (LORTAB) 7.5-325 MG per tablet; Take 1 tablet by mouth Every 8 (Eight) Hours As Needed for Moderate Pain . Fill when due, last filled 1/25/19  -     HYDROcodone-acetaminophen (LORTAB) 7.5-325 MG per tablet; Take 1 tablet by mouth Every 8 (Eight) Hours As Needed for Moderate Pain . Fill when due, last filled 1/25/19           PHQ-2/PHQ-9  Depression Screening 2/21/2019   Little interest or pleasure in doing things 0   Feeling down, depressed, or hopeless 0   Total Score 0     Patient understands the risks associated with this controlled medication, including tolerance and addiction.  Patient also agrees to only obtain this medication from me, and not from a another provider, unless that provider is covering for me in my absence.  Patient also agrees to be compliant in dosing, and not self adjust the dose of medication.  A signed controlled substance agreement is on file, and the patient has received a controlled substance education sheet at this a previous visit.  The patient has also signed a consent for treatment with a controlled substance as per [facility] policy. TERRY was obtained.    JASMINE Noel         Return in about 4 weeks (around 3/21/2019).    There are no Patient Instructions on file for this visit.

## 2019-03-21 ENCOUNTER — OFFICE VISIT (OUTPATIENT)
Dept: FAMILY MEDICINE CLINIC | Facility: CLINIC | Age: 64
End: 2019-03-21

## 2019-03-21 VITALS
SYSTOLIC BLOOD PRESSURE: 120 MMHG | HEART RATE: 86 BPM | HEIGHT: 69 IN | RESPIRATION RATE: 16 BRPM | BODY MASS INDEX: 23.7 KG/M2 | OXYGEN SATURATION: 98 % | TEMPERATURE: 97.6 F | DIASTOLIC BLOOD PRESSURE: 70 MMHG | WEIGHT: 160 LBS

## 2019-03-21 DIAGNOSIS — G89.29 CHRONIC MIDLINE LOW BACK PAIN WITHOUT SCIATICA: Chronic | ICD-10-CM

## 2019-03-21 DIAGNOSIS — Z79.899 ENCOUNTER FOR LONG-TERM CURRENT USE OF MEDICATION: ICD-10-CM

## 2019-03-21 DIAGNOSIS — M54.50 CHRONIC MIDLINE LOW BACK PAIN WITHOUT SCIATICA: Chronic | ICD-10-CM

## 2019-03-21 PROCEDURE — 99214 OFFICE O/P EST MOD 30 MIN: CPT | Performed by: NURSE PRACTITIONER

## 2019-03-21 RX ORDER — HYDROCODONE BITARTRATE AND ACETAMINOPHEN 7.5; 325 MG/1; MG/1
1 TABLET ORAL EVERY 8 HOURS PRN
Qty: 90 TABLET | Refills: 0 | Status: SHIPPED | OUTPATIENT
Start: 2019-03-21 | End: 2019-04-18 | Stop reason: SDUPTHER

## 2019-03-21 NOTE — PROGRESS NOTES
"Chief Complaint   Patient presents with   • Follow-up     4 week follow-up   • Med Refill     Subjective   Derrell Bynum is a 64 y.o. male who presents to the office for follow-up of chronic low back pain.    The following portions of the patient's history were reviewed and updated as appropriate: allergies, current medications, past family history, past medical history, past social history, past surgical history and problem list.    History of Present Illness   chronic lower back pain.  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 Location is midline/ bilateral lower back, radiating down left leg at times. Onset gradual and gradually worsening over the years. Pain is aching. It is steady/ constant, worsens with twisting or lifing. Pain is consistently present through day and night. Stiffness is worse in the morning. Denies numbness or weakness of legs. He reports a shooting tingling running down left buttock along outer left leg to foot. Feels \"like my foot is going to sleep\".  This radicular pain only occurs intermittently, once every 2-3 weeks, lasting about 10 minutes when it does occur.  States it improves with rest and rubbing his foot/leg. Reports this sensation occurs more frequently during cold weather. There is no loss of bowel / bladder control. He denies difficulty with moving bowels, reports one bowel movement daily without the use of stool softeners or laxatives.     Pain is managed to his satisfaction with three doses of lortab per day. States pain is worst in the morning, improves after first dose of Lortab. MRI last completed in 12/2018. Continues to decline referral to neurosurgery at this time to discuss surgical options for repair at this time.  He elects to continue waiting and watching for symptoms for now. Discussed signs and symptoms that would require a call to provider or emergent evaluation. Verbalized understanding. Refill of hydrocodone " "issued, Follow up in 4 weeks.       Past Medical History:   Diagnosis Date   • Acute bacterial sinusitis    • Acute pharyngitis    • Acute sinusitis    • Allergic rhinitis    • Chronic obstructive pulmonary disease with acute lower respiratory infection (CMS/HCC)    • Chronic sinusitis    • Coalworker's pneumoconiosis (CMS/HCC)    • Contact dermatitis    • COPD (chronic obstructive pulmonary disease) (CMS/HCC)    • Cough    • Dyspnea    • Encounter for immunization    • Erythrocytosis    • Essential hypertension    • Fever    • Hemoptysis    • Hoarse    • Hyperlipidemia    • Insect bite, nonvenomous of shoulder and upper arm, infected    • Malaise and fatigue     Other   • Need for immunization against influenza    • Osteoarthritis     Chronic daily pain   • Smokes tobacco daily    • Tobacco dependence syndrome    • Viral gastroenteritis 10/10/2013    Resolved   • Wheezing           Family History   Problem Relation Age of Onset   • Colon cancer Father         Colorectal Cancer   • Diabetes Father         Review of Systems   Constitutional: Negative for activity change, fever and unexpected weight change.   Respiratory: Negative for cough, chest tightness and shortness of breath.    Cardiovascular: Negative for chest pain.   Gastrointestinal: Negative for constipation.   Genitourinary: Negative for difficulty urinating and dysuria.   Musculoskeletal: Positive for back pain.   Neurological: Negative for weakness and numbness.   Psychiatric/Behavioral: Negative for sleep disturbance and suicidal ideas.       Objective   Vitals:    03/21/19 0933   BP: 120/70   BP Location: Left arm   Patient Position: Sitting   Cuff Size: Adult   Pulse: 86   Resp: 16   Temp: 97.6 °F (36.4 °C)   TempSrc: Oral   SpO2: 98%   Weight: 72.6 kg (160 lb)   Height: 175.3 cm (69\")   PainSc:   9     Physical Exam   Constitutional: He is oriented to person, place, and time. He appears well-developed and well-nourished.   HENT:   Head: " Normocephalic and atraumatic.   Eyes: Conjunctivae are normal. Pupils are equal, round, and reactive to light.   Neck: Normal range of motion.   Cardiovascular: Normal rate, regular rhythm, normal heart sounds and intact distal pulses. Exam reveals no gallop and no friction rub.   No murmur heard.  Pulmonary/Chest: Effort normal and breath sounds normal. No respiratory distress. He has no wheezes. He has no rales. He exhibits no tenderness.   Abdominal: Soft. Bowel sounds are normal. He exhibits no distension and no mass. There is no tenderness. There is no rebound and no guarding.   Musculoskeletal: He exhibits tenderness. He exhibits no edema or deformity.        Lumbar back: He exhibits decreased range of motion, tenderness, bony tenderness and pain.        Back:    Spinal and paraspinal tenderness throughout lumbar region   Lymphadenopathy:     He has no cervical adenopathy.   Neurological: He is alert and oriented to person, place, and time.   Skin: Skin is warm and dry. Capillary refill takes 2 to 3 seconds. No erythema. No pallor.   Psychiatric: He has a normal mood and affect. His behavior is normal. Judgment and thought content normal.   Nursing note and vitals reviewed.      Assessment/Plan   Derrell was seen today for follow-up and med refill.    Diagnoses and all orders for this visit:    Chronic midline low back pain without sciatica  -     HYDROcodone-acetaminophen (LORTAB) 7.5-325 MG per tablet; Take 1 tablet by mouth Every 8 (Eight) Hours As Needed for Moderate Pain . Fill when due, last filled 1/25/19    Encounter for long-term current use of medication  -     HYDROcodone-acetaminophen (LORTAB) 7.5-325 MG per tablet; Take 1 tablet by mouth Every 8 (Eight) Hours As Needed for Moderate Pain . Fill when due, last filled 1/25/19           PHQ-2/PHQ-9 Depression Screening 3/21/2019   Little interest or pleasure in doing things 0   Feeling down, depressed, or hopeless 0   Total Score 0     Patient  understands the risks associated with this controlled medication, including tolerance and addiction.  Patient also agrees to only obtain this medication from me, and not from a another provider, unless that provider is covering for me in my absence.  Patient also agrees to be compliant in dosing, and not self adjust the dose of medication.  A signed controlled substance agreement is on file, and the patient has received a controlled substance education sheet at this a previous visit.  The patient has also signed a consent for treatment with a controlled substance as per [facility] policy. TERRY was obtained.  JASMINE Noel         Return in about 4 weeks (around 4/18/2019).    Patient Instructions   Call office or report to ER with any sudden change in symptoms or loss of bowel or bladder control.

## 2019-03-21 NOTE — PATIENT INSTRUCTIONS
Call office or report to ER with any sudden change in symptoms or loss of bowel or bladder control.

## 2019-04-18 ENCOUNTER — OFFICE VISIT (OUTPATIENT)
Dept: FAMILY MEDICINE CLINIC | Facility: CLINIC | Age: 64
End: 2019-04-18

## 2019-04-18 VITALS
OXYGEN SATURATION: 95 % | HEART RATE: 73 BPM | WEIGHT: 159.1 LBS | TEMPERATURE: 97.3 F | DIASTOLIC BLOOD PRESSURE: 82 MMHG | HEIGHT: 69 IN | SYSTOLIC BLOOD PRESSURE: 144 MMHG | BODY MASS INDEX: 23.57 KG/M2 | RESPIRATION RATE: 16 BRPM

## 2019-04-18 DIAGNOSIS — E78.5 DYSLIPIDEMIA: ICD-10-CM

## 2019-04-18 DIAGNOSIS — Z12.2 ENCOUNTER FOR SCREENING FOR LUNG CANCER: ICD-10-CM

## 2019-04-18 DIAGNOSIS — J60 COALWORKER'S PNEUMOCONIOSIS (HCC): Chronic | ICD-10-CM

## 2019-04-18 DIAGNOSIS — Z79.899 ENCOUNTER FOR LONG-TERM CURRENT USE OF MEDICATION: ICD-10-CM

## 2019-04-18 DIAGNOSIS — M54.50 CHRONIC MIDLINE LOW BACK PAIN WITHOUT SCIATICA: Primary | Chronic | ICD-10-CM

## 2019-04-18 DIAGNOSIS — J44.9 CHRONIC OBSTRUCTIVE PULMONARY DISEASE, UNSPECIFIED COPD TYPE (HCC): Chronic | ICD-10-CM

## 2019-04-18 DIAGNOSIS — G89.29 CHRONIC MIDLINE LOW BACK PAIN WITHOUT SCIATICA: Primary | Chronic | ICD-10-CM

## 2019-04-18 DIAGNOSIS — Z71.6 TOBACCO ABUSE COUNSELING: ICD-10-CM

## 2019-04-18 DIAGNOSIS — Z87.891 PERSONAL HISTORY OF NICOTINE DEPENDENCE: ICD-10-CM

## 2019-04-18 DIAGNOSIS — Z79.891 ENCOUNTER FOR LONG-TERM USE OF OPIATE ANALGESIC: ICD-10-CM

## 2019-04-18 DIAGNOSIS — F17.200 CURRENT EVERY DAY SMOKER: ICD-10-CM

## 2019-04-18 PROCEDURE — G0439 PPPS, SUBSEQ VISIT: HCPCS | Performed by: NURSE PRACTITIONER

## 2019-04-18 PROCEDURE — 99214 OFFICE O/P EST MOD 30 MIN: CPT | Performed by: NURSE PRACTITIONER

## 2019-04-18 PROCEDURE — 96160 PT-FOCUSED HLTH RISK ASSMT: CPT | Performed by: NURSE PRACTITIONER

## 2019-04-18 RX ORDER — HYDROCODONE BITARTRATE AND ACETAMINOPHEN 7.5; 325 MG/1; MG/1
1 TABLET ORAL EVERY 8 HOURS PRN
Qty: 90 TABLET | Refills: 0 | Status: SHIPPED | OUTPATIENT
Start: 2019-04-18 | End: 2019-05-17 | Stop reason: SDUPTHER

## 2019-04-18 NOTE — PROGRESS NOTES
QUICK REFERENCE INFORMATION:  The ABCs of the Annual Wellness Visit    Subsequent Medicare Wellness Visit     HEALTH RISK ASSESSMENT    : 1955    Recent Hospitalizations:  No hospitalization(s) within the last year..  ccc      Current Medical Providers:  Patient Care Team:  Lou Hinds APRN as PCP - General (Family Medicine)        Smoking Status:  Social History     Tobacco Use   Smoking Status Current Every Day Smoker   • Packs/day: 1.00   • Years: 35.00   • Pack years: 35.00   • Types: Cigarettes   Smokeless Tobacco Never Used   Tobacco Comment    20-39 cigarettes/day (16)       Alcohol Consumption:  Social History     Substance and Sexual Activity   Alcohol Use Yes    Comment: Beer - 8 drinks a day. A  typical drinking day (16)       Depression Screen:   PHQ-2/PHQ-9 Depression Screening 2019   Little interest or pleasure in doing things 0   Feeling down, depressed, or hopeless 0   Total Score 0       Health Habits and Functional and Cognitive Screening:  Functional & Cognitive Status 2019   Do you have difficulty preparing food and eating? No   Do you have difficulty bathing yourself, getting dressed or grooming yourself? No   Do you have difficulty using the toilet? No   Do you have difficulty moving around from place to place? No   Do you have trouble with steps or getting out of a bed or a chair? No   In the past year have you fallen or experienced a near fall? No   Current Diet Well Balanced Diet   Dental Exam Up to date   Eye Exam Not up to date   Exercise (times per week) 6 times per week   Current Exercise Activities Include Yard Work   Do you need help using the phone?  (No Data)   Are you deaf or do you have serious difficulty hearing?  No   Do you need help with transportation? No   Do you need help shopping? No   Do you need help preparing meals?  No   Do you need help with housework?  No   Do you need help with laundry? No   Do you need help taking your medications?  No   Do you need help managing money? No   Do you ever drive or ride in a car without wearing a seat belt? No   Have you felt unusual stress, anger or loneliness in the last month? No   Who do you live with? Spouse   If you need help, do you have trouble finding someone available to you? No   Have you been bothered in the last four weeks by sexual problems? No   Do you have difficulty concentrating, remembering or making decisions? No           Does the patient have evidence of cognitive impairment? No    Asiprin use counseling: Taking ASA appropriately as indicated      Recent Lab Results:       Lab Results   Component Value Date    HGBA1C 5.6 11/29/2018     Lab Results   Component Value Date    CHOL 191 02/01/2018    TRIG 45 02/01/2018     (H) 02/01/2018    VLDL 9 02/01/2018    LDLHDL 0.50 02/01/2018           Age-appropriate Screening Schedule:  Refer to the list below for future screening recommendations based on patient's age, sex and/or medical conditions. Orders for these recommended tests are listed in the plan section. The patient has been provided with a written plan.    Health Maintenance   Topic Date Due   • ZOSTER VACCINE (2 of 2) 07/17/2017   • LIPID PANEL  02/01/2019   • INFLUENZA VACCINE  08/01/2019   • COLONOSCOPY  05/22/2024   • TDAP/TD VACCINES (2 - Td) 05/22/2027   • PNEUMOCOCCAL VACCINE (19-64 MEDIUM RISK)  Completed        Subjective   History of Present Illness    Derrell Bynum is a 64 y.o. male who presents for an Annual Wellness Visit.    The following portions of the patient's history were reviewed and updated as appropriate: allergies, current medications, past family history, past medical history, past social history, past surgical history and problem list.    Outpatient Medications Prior to Visit   Medication Sig Dispense Refill   • albuterol (PROVENTIL) (2.5 MG/3ML) 0.083% nebulizer solution Take 2.5 mg by nebulization Every 4 (Four) Hours As Needed for wheezing (Neb tx q6hrs  prn cough, congestion, wheezing).     • amLODIPine (NORVASC) 10 MG tablet Take 1 tablet by mouth Daily. 30 tablet 5   • aspirin 81 MG chewable tablet Chew 81 mg Daily.     • budesonide-formoterol (SYMBICORT) 160-4.5 MCG/ACT inhaler Inhale 2 puffs 2 (Two) Times a Day. 1 inhaler 5   • CloNIDine (CATAPRES) 0.1 MG tablet Take 1 tablet by mouth 2 (Two) Times a Day. If needed for B/P 150/90 or higher. 60 tablet 5   • fluticasone (FLONASE) 50 MCG/ACT nasal spray 1 spray into each nostril 2 (Two) Times a Day.     • Fluticasone Furoate-Vilanterol (BREO ELLIPTA) 100-25 MCG/INH aerosol powder  Inhale 1 puff Daily.     • loratadine (CLARITIN) 10 MG tablet Take 1 tablet by mouth Daily. 30 tablet 3   • montelukast (SINGULAIR) 10 MG tablet Take 1 tablet by mouth Every Night. 30 tablet 5   • pravastatin (PRAVACHOL) 20 MG tablet Take 1 tablet by mouth every night at bedtime. 30 tablet 5   • tiZANidine (ZANAFLEX) 2 MG tablet Take 1 tablet by mouth Every 8 (Eight) Hours As Needed for Muscle Spasms. 30 tablet 5   • HYDROcodone-acetaminophen (LORTAB) 7.5-325 MG per tablet Take 1 tablet by mouth Every 8 (Eight) Hours As Needed for Moderate Pain . Fill when due, last filled 1/25/19 90 tablet 0     No facility-administered medications prior to visit.        Patient Active Problem List   Diagnosis   • Coalworker's pneumoconiosis (CMS/[facility])   • COPD (chronic obstructive pulmonary disease) (CMS/[facility])   • Essential hypertension   • Hyperkalemia   • Polycythemia   • Muscle spasm   • Osteoarthritis of spine   • Encounter for long-term current use of medication   • Chronic midline low back pain without sciatica   • Encounter for long-term use of opiate analgesic   • Smoking   • Abnormal x-ray of lumbar spine       Advance Care Planning:  Patient does not have an advance directive - information provided to the patient today    Identification of Risk Factors:  Risk factors include: tobacco use.    Review of Systems    Compared to one year ago, the  "patient feels his physical health is worse.  Compared to one year ago, the patient feels his mental health is the same.    Objective     Physical Exam    Vitals:    04/18/19 0909   BP: 144/82   BP Location: Left arm   Patient Position: Sitting   Cuff Size: Adult   Pulse: 73   Resp: 16   Temp: 97.3 °F (36.3 °C)   TempSrc: Oral   SpO2: 95%   Weight: 72.2 kg (159 lb 1.6 oz)   Height: 175.3 cm (69\")   PainSc:   9   PainLoc: Back       Patient's Body mass index is 23.49 kg/m². BMI is within normal parameters. No follow-up required..      Assessment/Plan   Patient Self-Management and Personalized Health Advice  The patient has been provided with information about: tobacco cessation and preventive services including:   · Advance directive.    Visit Diagnoses:    ICD-10-CM ICD-9-CM   1. Chronic midline low back pain without sciatica M54.5 724.2    G89.29 338.29   2. Encounter for long-term current use of medication Z79.899 V58.69   3. Current every day smoker F17.200 305.1   4. Dyslipidemia E78.5 272.4   5. Encounter for long-term use of opiate analgesic Z79.891 V58.69   6. Chronic obstructive pulmonary disease, unspecified COPD type (CMS/[facility]) J44.9 496   7. Coalworker's pneumoconiosis (CMS/[facility]) J60 500   8. Personal history of nicotine dependence  Z87.891 V15.82       Orders Placed This Encounter   Procedures   • Lipid Panel   • ToxASSURE Select 13 Discrete -       Outpatient Encounter Medications as of 4/18/2019   Medication Sig Dispense Refill   • albuterol (PROVENTIL) (2.5 MG/3ML) 0.083% nebulizer solution Take 2.5 mg by nebulization Every 4 (Four) Hours As Needed for wheezing (Neb tx q6hrs prn cough, congestion, wheezing).     • amLODIPine (NORVASC) 10 MG tablet Take 1 tablet by mouth Daily. 30 tablet 5   • aspirin 81 MG chewable tablet Chew 81 mg Daily.     • budesonide-formoterol (SYMBICORT) 160-4.5 MCG/ACT inhaler Inhale 2 puffs 2 (Two) Times a Day. 1 inhaler 5   • CloNIDine (CATAPRES) 0.1 MG tablet Take 1 tablet by " mouth 2 (Two) Times a Day. If needed for B/P 150/90 or higher. 60 tablet 5   • fluticasone (FLONASE) 50 MCG/ACT nasal spray 1 spray into each nostril 2 (Two) Times a Day.     • Fluticasone Furoate-Vilanterol (BREO ELLIPTA) 100-25 MCG/INH aerosol powder  Inhale 1 puff Daily.     • HYDROcodone-acetaminophen (LORTAB) 7.5-325 MG per tablet Take 1 tablet by mouth Every 8 (Eight) Hours As Needed for Moderate Pain . Fill when due, last filled 1/25/19 90 tablet 0   • loratadine (CLARITIN) 10 MG tablet Take 1 tablet by mouth Daily. 30 tablet 3   • montelukast (SINGULAIR) 10 MG tablet Take 1 tablet by mouth Every Night. 30 tablet 5   • pravastatin (PRAVACHOL) 20 MG tablet Take 1 tablet by mouth every night at bedtime. 30 tablet 5   • tiZANidine (ZANAFLEX) 2 MG tablet Take 1 tablet by mouth Every 8 (Eight) Hours As Needed for Muscle Spasms. 30 tablet 5   • [DISCONTINUED] HYDROcodone-acetaminophen (LORTAB) 7.5-325 MG per tablet Take 1 tablet by mouth Every 8 (Eight) Hours As Needed for Moderate Pain . Fill when due, last filled 1/25/19 90 tablet 0     No facility-administered encounter medications on file as of 4/18/2019.        Reviewed use of high risk medication in the elderly: yes  Reviewed for potential of harmful drug interactions in the elderly: yes    Follow Up:  Return in about 4 weeks (around 5/16/2019).       An After Visit Summary and PPPS with all of these plans were given to the patient.             This document has been electronically signed by JASMINE Noel on April 18, 2019 9:44 AM           Low-Dose Lung Cancer CT Screening Visit    CHIEF COMPLAINT:    Shared Decision Making  I am discussing tobacco cessation today with Derrell Bynum    SMOKING HISTORY:     Social History     Tobacco Use   Smoking Status Current Every Day Smoker   • Packs/day: 1.00   • Years: 35.00   • Pack years: 35.00   • Types: Cigarettes   Smokeless Tobacco Never Used   Tobacco Comment    20-39 cigarettes/day (5/9/16)  "      SUBJECTIVE:     Derrell Bynum is currently smoking 1 pack(s) per day with a  48 pack year history.  Reports no use of alternate forms of tobacco, electronic cigarettes, marijuana or other substances.  Based on the recommendation of the [place] Preventive Services Task Force, this patient is at high risk for lung cancer and a low-dose CT screening scan is recommended.     The patient has had no hemoptysis, unintentional weight loss or increasing shortness of breath. The patient is asymptomatic and has no signs or symptoms of lung cancer.     Together we discussed the potential benefits and potential harms of being screened for lung cancer including the potential for follow up diagnostic testing, risk for over diagnosis, false positive rate and radiation exposure using the AHRQ: Is Lung Cancer Screening Right for Me Decision Aid (Publication [id number], web site [facility].gov). A copy of this decision aid resource has been provided in the after visit summary.  We also reviewed the patient's smoking history and counseled him on the importance and health benefits of stopping the use of tobacco products.      OBJECTIVE:    /82 (BP Location: Left arm, Patient Position: Sitting, Cuff Size: Adult)   Pulse 73   Temp 97.3 °F (36.3 °C) (Oral)   Resp 16   Ht 175.3 cm (69\")   Wt 72.2 kg (159 lb 1.6 oz)   SpO2 95%   BMI 23.49 kg/m²   General: no distress, alert and oriented  Chest: Lung sounds are clear to auscultation, no wheezes, rales or rhonchi, with symmetric air entry. No respiratory distress  Cardiovascular: RRR with no murmur auscultated      Smoking Cessation discussion:     We discussed that there are a number of resources and interventions to assist with smoking cessation if needed in the future including the 1-800-Quit Now line.(Included in the decision aid shared with the patient today).   On a scale of zero to ten, the patient rates their motivation to quit at a 2 out of 10 today.  " Referral to stop smoking class has been offered. Medication options for tobacco cessation have been discussed with the patient.     Recommendations for continued lung cancer screening:      We discussed the NCCN guidelines for lung cancer screening and the patient verbalized understanding that annual screening is recommended until fifteen years beyond smoking as long as they have no other disease or comorbidity that would prevent them from receiving cancer treatments such as surgery should a lung cancer be detected.  After review of the NCCN guidelines and recommendations for ongoing screening, the patient verbalized understanding of recommendations for follow-up.  The patient has decided to proceed with a Low Dose Lung Cancer Screening CT today.      5 minutes face-to-face spent counseling patient on the continued health benefits of having quit tobacco, maintaining smoking abstinence, smoking cessation strategies and resources, as well as the importance of adherence to annual lung cancer low-dose CT screening.

## 2019-04-18 NOTE — PROGRESS NOTES
"Chief Complaint   Patient presents with   • Pain     4 week f/u med refills DUEFOR MEDICARE WELNESS     Subjective   Derrell Bynum is a 64 y.o. male who presents to the office for chronic pain management requiring opioid pain medication.     The following portions of the patient's history were reviewed and updated as appropriate: allergies, current medications, past family history, past medical history, past social history, past surgical history and problem list.    History of Present Illness   chronic lower back pain.  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 Location is midline/ bilateral lower back, radiating down left leg at times. Onset gradual and gradually worsening over the years. Pain is aching. It is steady/ constant, worsens with twisting or lifing. Pain is consistently present through day and night. Stiffness is worse in the morning. Pain is rated at 9/10 today.  Denies numbness or weakness of legs. He reports a shooting tingling running down left buttock along outer left leg to foot. Feels \"like my foot is going to sleep\".  This radicular pain only occurs intermittently, once every 2-3 weeks, lasting about 10 minutes when it does occur.  States it improves with rest and rubbing his foot/leg. Reports this sensation occurs more frequently during cold weather. There is no loss of bowel / bladder control. He denies difficulty with moving bowels, reports one bowel movement daily without the use of stool softeners or laxatives.     Pain is managed to his satisfaction with three doses of lortab per day. States pain is worst in the morning, improves after first dose of Lortab. MRI last completed in 12/2018. Continues to decline referral to neurosurgery at this time to discuss surgical options for repair at this time.  He elects to continue waiting and watching for symptoms for now. Discussed signs and symptoms that would require a call to provider or " emergent evaluation. Verbalized understanding. Refill of hydrocodone issued, Follow up in 4 weeks.    COPD: chronic, stable symptoms managed well with Symbicort, Breo Ellipta and albuterol nebs. Complicated with chronic allergic rhinitis, managed well with montelucast and loratadine daily.     Current every day smoker/ personal history of nicotine addiction: not motivated to quit. Counseling x 5 minutes. Due for low dose CT scan screen for lung cancer, agrees to order.     Mixed hyperlipidemia: over due for labs, schedule today. Controlled with pravastatin 20 mg daily. Usually compliant with dietary guidelines.     Past Medical History:   Diagnosis Date   • Acute bacterial sinusitis    • Acute pharyngitis    • Acute sinusitis    • Allergic rhinitis    • Chronic obstructive pulmonary disease with acute lower respiratory infection (CMS/HCC)    • Chronic sinusitis    • Coalworker's pneumoconiosis (CMS/HCC)    • Contact dermatitis    • COPD (chronic obstructive pulmonary disease) (CMS/HCC)    • Cough    • Dyspnea    • Encounter for immunization    • Erythrocytosis    • Essential hypertension    • Fever    • Hemoptysis    • Hoarse    • Hyperlipidemia    • Insect bite, nonvenomous of shoulder and upper arm, infected    • Malaise and fatigue     Other   • Need for immunization against influenza    • Osteoarthritis     Chronic daily pain   • Smokes tobacco daily    • Tobacco dependence syndrome    • Viral gastroenteritis 10/10/2013    Resolved   • Wheezing           Family History   Problem Relation Age of Onset   • Colon cancer Father         Colorectal Cancer   • Diabetes Father         Review of Systems   Constitutional: Negative for activity change, fever and unexpected weight change.   Respiratory: Negative for cough, chest tightness and shortness of breath.    Cardiovascular: Negative for chest pain.   Gastrointestinal: Negative for constipation.   Genitourinary: Negative for difficulty urinating and dysuria.  "  Musculoskeletal: Positive for back pain.   Neurological: Negative for weakness and numbness.   Psychiatric/Behavioral: Negative for sleep disturbance and suicidal ideas.   All other systems reviewed and are negative.      Objective   Vitals:    04/18/19 0909   BP: 144/82   BP Location: Left arm   Patient Position: Sitting   Cuff Size: Adult   Pulse: 73   Resp: 16   Temp: 97.3 °F (36.3 °C)   TempSrc: Oral   SpO2: 95%   Weight: 72.2 kg (159 lb 1.6 oz)   Height: 175.3 cm (69\")   PainSc:   9   PainLoc: Back     Physical Exam   Constitutional: He is oriented to person, place, and time. He appears well-developed and well-nourished.   HENT:   Head: Normocephalic and atraumatic.   Eyes: Conjunctivae are normal. Pupils are equal, round, and reactive to light.   Neck: Normal range of motion.   Cardiovascular: Normal rate, regular rhythm, normal heart sounds and intact distal pulses. Exam reveals no gallop and no friction rub.   No murmur heard.  Pulmonary/Chest: Effort normal and breath sounds normal. No respiratory distress. He has no wheezes. He has no rales. He exhibits no tenderness.   Abdominal: Soft. Bowel sounds are normal. He exhibits no distension and no mass. There is no tenderness. There is no rebound and no guarding.   Musculoskeletal: He exhibits tenderness. He exhibits no edema or deformity.        Lumbar back: He exhibits decreased range of motion, tenderness, bony tenderness and pain.        Back:    Spinal and paraspinal tenderness throughout lumbar region   Lymphadenopathy:     He has no cervical adenopathy.   Neurological: He is alert and oriented to person, place, and time.   Skin: Skin is warm and dry. Capillary refill takes 2 to 3 seconds. No erythema. No pallor.   Psychiatric: He has a normal mood and affect. His behavior is normal. Judgment and thought content normal.   Nursing note and vitals reviewed.      Assessment/Plan   Derrell was seen today for pain.    Diagnoses and all orders for this " visit:    Chronic midline low back pain without sciatica  -     HYDROcodone-acetaminophen (LORTAB) 7.5-325 MG per tablet; Take 1 tablet by mouth Every 8 (Eight) Hours As Needed for Moderate Pain . Fill when due, last filled 1/25/19    Encounter for long-term current use of medication  -     HYDROcodone-acetaminophen (LORTAB) 7.5-325 MG per tablet; Take 1 tablet by mouth Every 8 (Eight) Hours As Needed for Moderate Pain . Fill when due, last filled 1/25/19    Current every day smoker    Dyslipidemia  -     Lipid Panel    Encounter for long-term use of opiate analgesic  -     ToxASSURE Select 13 Discrete -    Chronic obstructive pulmonary disease, unspecified COPD type (CMS/[facility])  Comments:  stable continue current meds. sees [facility] provider regulary for pneumoconiosis.     Coalworker's pneumoconiosis (CMS/[facility])  Comments:  stable, regular follow up with [facility] provider noted.     Personal history of nicotine dependence            PHQ-2/PHQ-9 Depression Screening 4/18/2019   Little interest or pleasure in doing things 0   Feeling down, depressed, or hopeless 0   Total Score 0       JASMINE Noel         Return in about 4 weeks (around 5/16/2019).    There are no Patient Instructions on file for this visit.    Office Visit on 02/21/2019   Component Date Value Ref Range Status   • WBC 02/21/2019 10.53* 3.20 - 9.80 10*3/mm3 Final   • RBC 02/21/2019 5.30  4.37 - 5.74 10*6/mm3 Final   • Hemoglobin 02/21/2019 17.1  13.7 - 17.3 g/dL Final   • Hematocrit 02/21/2019 50.0* 39.0 - 49.0 % Final   • MCV 02/21/2019 94.3  80.0 - 98.0 fL Final   • MCH 02/21/2019 32.3  26.5 - 34.0 pg Final   • MCHC 02/21/2019 34.2  31.5 - 36.3 g/dL Final   • RDW 02/21/2019 14.0  11.5 - 14.5 % Final   • RDW-SD 02/21/2019 47.5* 35.1 - 43.9 fl Final   • MPV 02/21/2019 9.1  8.0 - 12.0 fL Final   • Platelets 02/21/2019 303  150 - 450 10*3/mm3 Final   • Neutrophil % 02/21/2019 75.4  37.0 - 80.0 % Final   • Lymphocyte %  02/21/2019 11.5  10.0 - 50.0 % Final   • Monocyte % 02/21/2019 10.3  0.0 - 12.0 % Final   • Eosinophil % 02/21/2019 2.5  0.0 - 7.0 % Final   • Basophil % 02/21/2019 0.3  0.0 - 2.0 % Final   • Neutrophils, Absolute 02/21/2019 7.95  2.00 - 8.60 10*3/mm3 Final   • Lymphocytes, Absolute 02/21/2019 1.21  0.60 - 4.20 10*3/mm3 Final   • Monocytes, Absolute 02/21/2019 1.08* 0.00 - 0.90 10*3/mm3 Final   • Eosinophils, Absolute 02/21/2019 0.26  0.00 - 0.70 10*3/mm3 Final   • Basophils, Absolute 02/21/2019 0.03  0.00 - 0.20 10*3/mm3 Final   ]

## 2019-04-29 ENCOUNTER — LAB (OUTPATIENT)
Dept: LAB | Facility: OTHER | Age: 64
End: 2019-04-29

## 2019-04-29 DIAGNOSIS — D75.1 POLYCYTHEMIA: ICD-10-CM

## 2019-04-29 PROCEDURE — G0481 DRUG TEST DEF 8-14 CLASSES: HCPCS | Performed by: NURSE PRACTITIONER

## 2019-04-29 PROCEDURE — 80307 DRUG TEST PRSMV CHEM ANLYZR: CPT | Performed by: NURSE PRACTITIONER

## 2019-05-03 ENCOUNTER — TELEPHONE (OUTPATIENT)
Dept: FAMILY MEDICINE CLINIC | Facility: CLINIC | Age: 64
End: 2019-05-03

## 2019-05-03 NOTE — TELEPHONE ENCOUNTER
----- Message from JASMINE Leal sent at 4/30/2019  5:51 PM CDT -----  Please notify pt of no sign of lung cancer on his chest CT screening for cancers.  He does have emphysema changes and some right and left heart calcification consistent with heart disease. Repeat testing in 1 year. [place]

## 2019-05-04 LAB
7-AMINOCLONAZEPAM UR: NOT DETECTED NG/MG CREAT
A-OH ALPRAZ/CREAT UR: NOT DETECTED NG/MG CREAT
ALFENTANIL UR QL: NOT DETECTED NG/MG CREAT
ALPHA-HYDROXYTRIAZOLAM, URINE: NOT DETECTED NG/MG CREAT
AMOBARBITAL UR: NOT DETECTED
AMPHET UR QL CFM: NOT DETECTED NG/MG CREAT
AMPHETAMINES UR QL SCN: NEGATIVE
BARBITAL UR QL CFM: NOT DETECTED
BARBITURATES UR QL SCN: NEGATIVE
BENZODIAZ UR QL SCN: NEGATIVE
BENZOYLECGONINE UR: NOT DETECTED NG/MG CREAT
BUPRENORPHINE UR QL: NEGATIVE
BUPRENORPHINE UR QL: NOT DETECTED NG/MG CREAT
BUTABARBITAL UR QL: NOT DETECTED
BUTALBITAL UR QL: NOT DETECTED
CANNABINOIDS UR QL CFM: NEGATIVE
CLONAZEPAM UR QL: NOT DETECTED NG/MG CREAT
COCAETHYLENE UR QL CFM: NOT DETECTED NG/MG CREAT
COCAINE UR QL CFM: NEGATIVE
CODEINE UR QL: NOT DETECTED NG/MG CREAT
CONV COCAINE, UR: NOT DETECTED NG/MG CREAT
CONV REPORT SUMMARY: NORMAL
CREAT 24H UR-MCNC: 120 MG/DL
DESALKYLFLURAZ/CREAT UR: NOT DETECTED NG/MG CREAT
DIAZEPAM UR-MCNC: NOT DETECTED NG/MG CREAT
DIHYDROCODEINE UR: 142 NG/MG CREAT
EDDP SERPL QL: NOT DETECTED NG/MG CREAT
ETHANOL SCREEN URINE (REF): NEGATIVE
ETHANOL UR-MCNC: NOT DETECTED G/DL
FENTANYL UR QL: NEGATIVE
FENTANYL+NORFENTANYL UR QL SCN: NOT DETECTED NG/MG CREAT
HX OF MEDICATION USE: NORMAL
HYDROCODONE UR QL: 686 NG/MG CREAT
HYDROMORPHONE UR QL: 360 NG/MG CREAT
LEVEL OF DETECTION:: NORMAL
LORAZEPAM UR-MCNC: NOT DETECTED NG/MG CREAT
LORAZEPAM/CREAT UR: NOT DETECTED NG/MG CREAT
MDA SERPLBLD-MCNC: NOT DETECTED NG/MG CREAT
MDMA UR QL SCN: NOT DETECTED NG/MG CREAT
MEPHOBARBITAL UR QL CFM: NOT DETECTED
METHADONE BLD QL SCN: NEGATIVE
METHADONE, URINE: NOT DETECTED NG/MG CREAT
METHAMPHETAMINE UR: NOT DETECTED NG/MG CREAT
MIDAZOLAM UR-MCNC: NOT DETECTED NG/MG CREAT
MORPHINE UR QL: NOT DETECTED NG/MG CREAT
N-DESMETHYLTRAMADOL, U: NOT DETECTED NG/MG CREAT
NARCOTICS UR: NEGATIVE
NORBUPRENORPHINE SERPLBLD-MCNC: NOT DETECTED NG/MG CREAT
NORCODEINE UR-MCNC: NOT DETECTED NG/MG CREAT
NORDIAZEPAM SERPL CFM-MCNC: NOT DETECTED NG/MG CREAT
NORFENTANYL UR: NOT DETECTED NG/MG CREAT
NOROXYMORPHONE: NOT DETECTED NG/MG CREAT
O-DESMETHYLTRAMADOL, UR: NOT DETECTED NG/MG CREAT
OPIATES UR QL CFM: NORMAL
OPIATES, URINE, NORHYDROCODONE: 779 NG/MG CREAT
OPIATES, URINE, NOROXYCODONE: NOT DETECTED NG/MG CREAT
OXAZEPAM UR QL: NOT DETECTED NG/MG CREAT
OXYCODONE UR QL: NEGATIVE
OXYCODONE UR: NOT DETECTED NG/MG CREAT
OXYMORPHONE UR: NOT DETECTED NG/MG CREAT
PENTOBARBITAL UR: NOT DETECTED
PHENOBARB UR QL: NOT DETECTED
SECOBARBITAL UR QL: NOT DETECTED
SUFENTANIL UR QL: NOT DETECTED NG/MG CREAT
TAPENTADOL SERPLBLD-MCNC: NEGATIVE NG/ML
TAPENTADOL UR-MCNC: NOT DETECTED NG/MG CREAT
TEMAZEPAM UR QL CFM: NOT DETECTED NG/MG CREAT
THC UR CFM-MCNC: NOT DETECTED NG/MG CREAT
THIOPENTAL UR QL CFM: NOT DETECTED
TRAMADOL UR: NOT DETECTED NG/MG CREAT

## 2019-05-14 ENCOUNTER — TELEPHONE (OUTPATIENT)
Dept: FAMILY MEDICINE CLINIC | Facility: CLINIC | Age: 64
End: 2019-05-14

## 2019-05-14 LAB
BASOPHILS # BLD AUTO: 0.02 10*3/MM3 (ref 0–0.2)
BASOPHILS NFR BLD AUTO: 0.2 % (ref 0–1.5)
CHOLEST SERPL-MCNC: 191 MG/DL (ref 150–200)
DEPRECATED RDW RBC AUTO: 46.7 FL (ref 37–54)
EOSINOPHIL # BLD AUTO: 0.25 10*3/MM3 (ref 0–0.4)
EOSINOPHIL NFR BLD AUTO: 3 % (ref 0.3–6.2)
ERYTHROCYTE [DISTWIDTH] IN BLOOD BY AUTOMATED COUNT: 13.5 % (ref 12.3–15.4)
HCT VFR BLD AUTO: 50.2 % (ref 37.5–51)
HDLC SERPL-MCNC: 129 MG/DL (ref 40–59)
HGB BLD-MCNC: 17.4 G/DL (ref 13–17.7)
LDLC SERPL CALC-MCNC: 52 MG/DL
LDLC/HDLC SERPL: 0.4 {RATIO} (ref 0–3.55)
LYMPHOCYTES # BLD AUTO: 1.35 10*3/MM3 (ref 0.7–3.1)
LYMPHOCYTES NFR BLD AUTO: 16.1 % (ref 19.6–45.3)
MCH RBC QN AUTO: 33.2 PG (ref 26.6–33)
MCHC RBC AUTO-ENTMCNC: 34.7 G/DL (ref 31.5–35.7)
MCV RBC AUTO: 95.8 FL (ref 79–97)
MONOCYTES # BLD AUTO: 0.86 10*3/MM3 (ref 0.1–0.9)
MONOCYTES NFR BLD AUTO: 10.2 % (ref 5–12)
NEUTROPHILS # BLD AUTO: 5.92 10*3/MM3 (ref 1.7–7)
NEUTROPHILS NFR BLD AUTO: 70.5 % (ref 42.7–76)
PLATELET # BLD AUTO: 287 10*3/MM3 (ref 140–450)
PMV BLD AUTO: 9.4 FL (ref 6–12)
RBC # BLD AUTO: 5.24 10*6/MM3 (ref 4.14–5.8)
TRIGL SERPL-MCNC: 51 MG/DL
VLDLC SERPL-MCNC: 10.2 MG/DL
WBC NRBC COR # BLD: 8.4 10*3/MM3 (ref 3.4–10.8)

## 2019-05-14 PROCEDURE — 85025 COMPLETE CBC W/AUTO DIFF WBC: CPT | Performed by: NURSE PRACTITIONER

## 2019-05-14 PROCEDURE — 36415 COLL VENOUS BLD VENIPUNCTURE: CPT | Performed by: NURSE PRACTITIONER

## 2019-05-14 PROCEDURE — 80061 LIPID PANEL: CPT | Performed by: NURSE PRACTITIONER

## 2019-05-14 NOTE — TELEPHONE ENCOUNTER
----- Message from JASMINE Leal sent at 5/14/2019 11:13 AM CDT -----  Notify pt of great cholesterol results, repeat in 1 year, thanks clw

## 2019-05-14 NOTE — TELEPHONE ENCOUNTER
----- Message from JASMINE Leal sent at 5/14/2019 11:14 AM CDT -----  Naomi please notify pt of essentially normal blood counts thanks preston

## 2019-05-17 ENCOUNTER — OFFICE VISIT (OUTPATIENT)
Dept: FAMILY MEDICINE CLINIC | Facility: CLINIC | Age: 64
End: 2019-05-17

## 2019-05-17 VITALS
SYSTOLIC BLOOD PRESSURE: 142 MMHG | HEART RATE: 80 BPM | WEIGHT: 157.3 LBS | TEMPERATURE: 97.5 F | DIASTOLIC BLOOD PRESSURE: 84 MMHG | RESPIRATION RATE: 16 BRPM | BODY MASS INDEX: 21.31 KG/M2 | OXYGEN SATURATION: 97 % | HEIGHT: 72 IN

## 2019-05-17 DIAGNOSIS — E78.5 DYSLIPIDEMIA: ICD-10-CM

## 2019-05-17 DIAGNOSIS — M54.50 CHRONIC MIDLINE LOW BACK PAIN WITHOUT SCIATICA: Chronic | ICD-10-CM

## 2019-05-17 DIAGNOSIS — G89.29 CHRONIC MIDLINE LOW BACK PAIN WITHOUT SCIATICA: Chronic | ICD-10-CM

## 2019-05-17 DIAGNOSIS — R73.9 HYPERGLYCEMIA: Primary | ICD-10-CM

## 2019-05-17 DIAGNOSIS — Z79.891 LONG TERM (CURRENT) USE OF OPIATE ANALGESIC: ICD-10-CM

## 2019-05-17 DIAGNOSIS — I10 ESSENTIAL HYPERTENSION: ICD-10-CM

## 2019-05-17 DIAGNOSIS — Z79.899 ENCOUNTER FOR LONG-TERM CURRENT USE OF MEDICATION: ICD-10-CM

## 2019-05-17 PROCEDURE — 99214 OFFICE O/P EST MOD 30 MIN: CPT | Performed by: NURSE PRACTITIONER

## 2019-05-17 RX ORDER — PRAVASTATIN SODIUM 20 MG
10 TABLET ORAL
Qty: 45 TABLET | Refills: 3
Start: 2019-05-17 | End: 2019-11-15 | Stop reason: DRUGHIGH

## 2019-05-17 RX ORDER — AMLODIPINE BESYLATE 10 MG/1
10 TABLET ORAL DAILY
Qty: 30 TABLET | Refills: 5 | Status: SHIPPED | OUTPATIENT
Start: 2019-05-17 | End: 2019-11-15 | Stop reason: SDUPTHER

## 2019-05-17 RX ORDER — HYDROCODONE BITARTRATE AND ACETAMINOPHEN 7.5; 325 MG/1; MG/1
1 TABLET ORAL EVERY 8 HOURS PRN
Qty: 90 TABLET | Refills: 0 | Status: SHIPPED | OUTPATIENT
Start: 2019-05-17 | End: 2019-06-17 | Stop reason: SDUPTHER

## 2019-05-17 NOTE — PROGRESS NOTES
"Chief Complaint   Patient presents with   • Back Pain     4 week f/u med refills     Subjective   Derrell Bynum is a 64 y.o. male who presents to the office for chronic pain management.    The following portions of the patient's history were reviewed and updated as appropriate: allergies, current medications, past family history, past medical history, past social history, past surgical history and problem list.    History of Present Illness     Last fasting lipids 5/14/19 WNL   4/29/19 UDS appropriate  A1C 11/29/19 5.6%  CMP 11/29/18 glucose 116  CBC 5/14/19 WNL    chronic lower back pain.  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 Location is midline/ bilateral lower back, radiating down left leg at times. Onset gradual and gradually worsening over the years. Pain is aching. It is steady/ constant, worsens with twisting or lifing. Pain is consistently present through day and night. Stiffness is worse in the morning. Pain is rated at 9/10 today.  Denies numbness or weakness of legs. He reports a shooting tingling running down left buttock along outer left leg to foot. Feels \"like my foot is going to sleep\".  This radicular pain only occurs intermittently, once every 2-3 weeks, lasting about 10 minutes when it does occur.  States it improves with rest and rubbing his foot/leg. Reports this sensation occurs more frequently during cold weather. There is no loss of bowel / bladder control. He denies difficulty with moving bowels, reports one bowel movement daily without the use of stool softeners or laxatives.     Pain is managed to his satisfaction with three doses of lortab per day. States pain is worst in the morning, improves after first dose of Lortab. MRI last completed in 12/2018. Continues to decline referral to neurosurgery at this time to discuss surgical options for repair at this time.  He elects to continue waiting and watching for symptoms for " now. Discussed signs and symptoms that would require a call to provider or emergent evaluation. Verbalized understanding. Refill of hydrocodone issued, Follow up in 4 weeks.     COPD: chronic, stable symptoms managed well with Symbicort, Breo Ellipta and albuterol nebs. Complicated with chronic allergic rhinitis, managed well with montelucast and loratadine daily.      Current every day smoker/ personal history of nicotine addiction: not motivated to quit. Counseling x 5 minutes.Not motivated to quit.   Mixed hyperlipidemia: over due for labs, schedule today. Controlled with pravastatin 20 mg daily. Usually compliant with dietary guidelines. Labs were normal with HDL of 129. Wants to decrease dose of Pravastatin, so this is done today, repeat labs in 6 months.           Past Medical History:   Diagnosis Date   • Acute bacterial sinusitis    • Acute pharyngitis    • Acute sinusitis    • Allergic rhinitis    • Chronic obstructive pulmonary disease with acute lower respiratory infection (CMS/HCC)    • Chronic sinusitis    • Coalworker's pneumoconiosis (CMS/HCC)    • Contact dermatitis    • COPD (chronic obstructive pulmonary disease) (CMS/HCC)    • Cough    • Dyspnea    • Encounter for immunization    • Erythrocytosis    • Essential hypertension    • Fever    • Hemoptysis    • Hoarse    • Hyperlipidemia    • Insect bite, nonvenomous of shoulder and upper arm, infected    • Malaise and fatigue     Other   • Need for immunization against influenza    • Osteoarthritis     Chronic daily pain   • Smokes tobacco daily    • Tobacco dependence syndrome    • Viral gastroenteritis 10/10/2013    Resolved   • Wheezing           Family History   Problem Relation Age of Onset   • Colon cancer Father         Colorectal Cancer   • Diabetes Father         Review of Systems   Constitutional: Negative.  Negative for activity change, fever and unexpected weight change.   HENT: Negative.    Eyes: Negative.    Respiratory: Negative.   "Negative for cough, chest tightness and shortness of breath.    Cardiovascular: Negative.  Negative for chest pain.   Gastrointestinal: Negative.  Negative for constipation.   Endocrine: Negative.    Genitourinary: Negative.  Negative for difficulty urinating and dysuria.   Musculoskeletal: Positive for back pain.   Skin: Negative.  Negative for color change, pallor, rash and wound.   Allergic/Immunologic: Negative.    Neurological: Negative.  Negative for weakness and numbness.   Hematological: Negative.    Psychiatric/Behavioral: Negative.  Negative for sleep disturbance and suicidal ideas.   All other systems reviewed and are negative.      Objective   Vitals:    05/17/19 0826   BP: 142/84   BP Location: Left arm   Patient Position: Sitting   Cuff Size: Adult   Pulse: 80   Resp: 16   Temp: 97.5 °F (36.4 °C)   SpO2: 97%   Weight: 71.4 kg (157 lb 4.8 oz)   Height: 183.4 cm (72.2\")   PainSc:   9   PainLoc: Back     Physical Exam   Constitutional: He is oriented to person, place, and time. He appears well-developed and well-nourished.   HENT:   Head: Normocephalic and atraumatic.   Eyes: Conjunctivae are normal. Pupils are equal, round, and reactive to light.   Neck: Normal range of motion.   Cardiovascular: Normal rate, regular rhythm, normal heart sounds and intact distal pulses. Exam reveals no gallop and no friction rub.   No murmur heard.  Pulmonary/Chest: Effort normal and breath sounds normal. No respiratory distress. He has no wheezes. He has no rales. He exhibits no tenderness.   Abdominal: Soft. Bowel sounds are normal. He exhibits no distension and no mass. There is no tenderness. There is no rebound and no guarding.   Musculoskeletal: He exhibits tenderness. He exhibits no edema or deformity.        Lumbar back: He exhibits decreased range of motion, tenderness, bony tenderness and pain.        Back:    Spinal and paraspinal tenderness throughout lumbar region   Lymphadenopathy:     He has no cervical " adenopathy.   Neurological: He is alert and oriented to person, place, and time.   Skin: Skin is warm and dry. Capillary refill takes 2 to 3 seconds. No erythema. No pallor.   Psychiatric: He has a normal mood and affect. His behavior is normal. Judgment and thought content normal.   Nursing note and vitals reviewed.      Assessment/Plan   Derrell was seen today for back pain.    Diagnoses and all orders for this visit:    Hyperglycemia  -     Hemoglobin A1c; Future    Essential hypertension  -     amLODIPine (NORVASC) 10 MG tablet; Take 1 tablet by mouth Daily.  -     CBC & Differential; Future  -     Comprehensive Metabolic Panel; Future  -     T4, Free; Future  -     TSH; Future  -     T3; Future    Chronic midline low back pain without sciatica  -     HYDROcodone-acetaminophen (LORTAB) 7.5-325 MG per tablet; Take 1 tablet by mouth Every 8 (Eight) Hours As Needed for Moderate Pain . Fill when due, last filled 1/25/19    Encounter for long-term current use of medication  -     HYDROcodone-acetaminophen (LORTAB) 7.5-325 MG per tablet; Take 1 tablet by mouth Every 8 (Eight) Hours As Needed for Moderate Pain . Fill when due, last filled 1/25/19    Dyslipidemia  -     pravastatin (PRAVACHOL) 20 MG tablet; Take 0.5 tablets by mouth every night at bedtime.  -     Lipid Panel; Future    Long term (current) use of opiate analgesic  -     ToxASSURE Select 13 Discrete -; Future           PHQ-2/PHQ-9 Depression Screening 5/17/2019   Little interest or pleasure in doing things 0   Feeling down, depressed, or hopeless 0   Total Score 0   Patient understands the risks associated with this controlled medication, including tolerance and addiction.  Patient also agrees to only obtain this medication from me, and not from a another provider, unless that provider is covering for me in my absence.  Patient also agrees to be compliant in dosing, and not self adjust the dose of medication.  A signed controlled substance agreement is on  file, and the patient has received a controlled substance education sheet at this a previous visit.  The patient has also signed a consent for treatment with a controlled substance as per [facility] policy. TERRY was obtained.      JASMINE Noel         Return in about 4 weeks (around 6/14/2019).    Patient Instructions   See me in 4 weeks for pain management   see me in 6 months for cholesterol, blood pressure, labs-- have labs done 2-3 days before.      Lab on 04/29/2019   Component Date Value Ref Range Status   • WBC 05/14/2019 8.40  3.40 - 10.80 10*3/mm3 Final   • RBC 05/14/2019 5.24  4.14 - 5.80 10*6/mm3 Final   • Hemoglobin 05/14/2019 17.4  13.0 - 17.7 g/dL Final   • Hematocrit 05/14/2019 50.2  37.5 - 51.0 % Final   • MCV 05/14/2019 95.8  79.0 - 97.0 fL Final   • MCH 05/14/2019 33.2* 26.6 - 33.0 pg Final   • MCHC 05/14/2019 34.7  31.5 - 35.7 g/dL Final   • RDW 05/14/2019 13.5  12.3 - 15.4 % Final   • RDW-SD 05/14/2019 46.7  37.0 - 54.0 fl Final   • MPV 05/14/2019 9.4  6.0 - 12.0 fL Final   • Platelets 05/14/2019 287  140 - 450 10*3/mm3 Final   • Neutrophil % 05/14/2019 70.5  42.7 - 76.0 % Final   • Lymphocyte % 05/14/2019 16.1* 19.6 - 45.3 % Final   • Monocyte % 05/14/2019 10.2  5.0 - 12.0 % Final   • Eosinophil % 05/14/2019 3.0  0.3 - 6.2 % Final   • Basophil % 05/14/2019 0.2  0.0 - 1.5 % Final   • Neutrophils, Absolute 05/14/2019 5.92  1.70 - 7.00 10*3/mm3 Final   • Lymphocytes, Absolute 05/14/2019 1.35  0.70 - 3.10 10*3/mm3 Final   • Monocytes, Absolute 05/14/2019 0.86  0.10 - 0.90 10*3/mm3 Final   • Eosinophils, Absolute 05/14/2019 0.25  0.00 - 0.40 10*3/mm3 Final   • Basophils, Absolute 05/14/2019 0.02  0.00 - 0.20 10*3/mm3 Final   Office Visit on 04/18/2019   Component Date Value Ref Range Status   • Total Cholesterol 05/14/2019 191  150 - 200 mg/dL Final   • Triglycerides 05/14/2019 51  <=150 mg/dL Final   • HDL Cholesterol 05/14/2019 129* 40 - 59 mg/dL Final   • LDL Cholesterol  05/14/2019  52  <=100 mg/dL Final   • VLDL Cholesterol 05/14/2019 10.2  mg/dL Final   • LDL/HDL Ratio 05/14/2019 0.40  0.00 - 3.55 Final   • Report Summary 04/29/2019 FINAL   Final    Comment: ====================================================================  TOXASSURE SELECT 13 KHOI-DIS  ====================================================================  Test                             Result       Flag       Units  Drug Present    Hydrocodone                    686                     ng/mg creat    Hydromorphone                  360                     ng/mg creat    Dihydrocodeine                 142                     ng/mg creat    Norhydrocodone                 779                     ng/mg creat     Sources of hydrocodone include scheduled prescription     medications. Hydromorphone, dihydrocodeine and norhydrocodone are     expected metabolites of hydrocodone. Hydromorphone and     dihydrocodeine are also available as scheduled prescription     medications.  ====================================================================  Test                      Result    Flag   Units      Ref Range    Creatinine              120              mg/dL                                 >=20  ====================================================================  Declared Medications:   Medication list was not provided.  ====================================================================  For clinical consultation, please call [phone].  ====================================================================   • Ethanol Screen Urine (Ref) 04/29/2019 Negative   Final   • Ethanol, Urine 04/29/2019 Not Detected  g/dL Final   • Amphetamine, Urine Qual 04/29/2019 Negative   Final   • Methamphetamine, Urine 04/29/2019 Not Detected  ng/mg creat Final   • Amphetamine, Urine 04/29/2019 Not Detected  ng/mg creat Final   • MDMA URINE 04/29/2019 Not Detected  ng/mg creat Final   • MDA 04/29/2019 Not Detected  ng/mg creat Final   •  Benzodiazepine Screen, Urine 04/29/2019 Negative   Final   • DIAZEPAM URINE QUANT (REF) 04/29/2019 Not Detected  ng/mg creat Final   • Desmethyldiazepam 04/29/2019 Not Detected  ng/mg creat Final   • Oxazepam, urine 04/29/2019 Not Detected  ng/mg creat Final   • Temazepam, urine 04/29/2019 Not Detected  ng/mg creat Final    Expected metabolism of benzodiazepine class drugs:   Parent Drug       Detected Metabolites   -----------       --------------------   Diazepam:         Desmethyldiazepam, Temazepam, Oxazepam   Chlordiazepoxide: Desmethyldiazepam, Oxazepam   Clorazepate:      Desmethyldiazepam, Oxazepam   Halazepam:        Desmethyldiazepam, Oxazepam   Temazepam:        Oxazepam   Oxazepam:         None   • ALPRAZOLAM 04/29/2019 Not Detected  ng/mg creat Final   • ALPHA-HYDROXYALPRAZOLAM UR, QT (RE* 04/29/2019 Not Detected  ng/mg creat Final   • DESALKLFLURAZEPAM UR QUANT (REF) 04/29/2019 Not Detected  ng/mg creat Final   • LORAZEPAM URINE QUANT (REF) 04/29/2019 Not Detected  ng/mg creat Final   • Alpha-hydroxytriazolam, Urine 04/29/2019 Not Detected  ng/mg creat Final   • Clonazepam Urine 04/29/2019 Not Detected  ng/mg creat Final   • 7-Aminoclonazepam Urine 04/29/2019 Not Detected  ng/mg creat Final   • MIDAZOLAM UR, QUANT (REF) 04/29/2019 Not Detected  ng/mg creat Final   • Cocaine & Metabolite 04/29/2019 Negative   Final   • Cocaine Screen, Urine 04/29/2019 Not Detected  ng/mg creat Final   • Benzoylecgonine, Urine 04/29/2019 Not Detected  ng/mg creat Final   • Cocaethylene Ur 04/29/2019 Not Detected  ng/mg creat Final   • THC, Urine 04/29/2019 Negative   Final   • Carboxy THC, Urine 04/29/2019 Not Detected  ng/mg creat Final   • Opiate Class 04/29/2019 +POSITIVE+   Final   • Codeine, Urine 04/29/2019 Not Detected  ng/mg creat Final   • Morphine, Urine 04/29/2019 Not Detected  ng/mg creat Final   • Norcodeine Ur 04/29/2019 Not Detected  ng/mg creat Final   • Hydrocodone UR 04/29/2019 686  ng/mg creat Final    • Hydromorphone Urine 04/29/2019 360  ng/mg creat Final   • Dihydrocodeine, Urine 04/29/2019 142  ng/mg creat Final   • Opiates, Norhydrocodone, Urine 04/29/2019 779  ng/mg creat Final    Expected metabolism of opiate class drugs:  Parent Drug       Detected Metabolites   -----------       --------------------   Codeine:          Major:  Morphine, Norcodeine                     Minor:  Hydrocodone, Hydromorphone,                             Dihydrocodeine, Norhydrocodone,                             Normorphine   Morphine:         Major:  Normorphine                     Minor:  Hydromorphone   Hydrocodone:      Hydromorphone, Dihydrocodeine,                     Norhydrocodone   Hydromorphone:    None   Dihydrocodeine:   None   Heroin:           6-Acetylmorphine (if included),                     Morphine, Normorphine                     Codeine, in small amounts in comparison                      to morphine, is often detected when                      heroin is the source drug.   • Oxycodone Class Ur 04/29/2019 Negative   Final   • Oxycodone, Confirmation, Urine 04/29/2019 Not Detected  ng/mg creat Final   • Oxymorphone UR 04/29/2019 Not Detected  ng/mg creat Final   • Opiates, Noroxycodone, Urine 04/29/2019 Not Detected  ng/mg creat Final   • Noroxymorphone 04/29/2019 Not Detected  ng/mg creat Final    Expected metabolism of oxycodone class drugs:   Parent Drug       Detected Metabolites   -----------       --------------------   Oxycodone:        Oxymorphone, Noroxycodone, Noroxymorphone   Oxymorphone:      Noroxymorphone   • Methadone 04/29/2019 Negative   Final   • Methadone, Quantitative 04/29/2019 Not Detected  ng/mg creat Final   • EDDP 04/29/2019 Not Detected  ng/mg creat Final   • Fentanyl and Analogues, Ur 04/29/2019 Negative   Final   • Fentanyl, Urine 04/29/2019 Not Detected  ng/mg creat Final   • Norfentanyl Urine 04/29/2019 Not Detected  ng/mg creat Final   • Sufentanil, Ur 04/29/2019 Not Detected   ng/mg creat Final   • Alfentanil, Ur 04/29/2019 Not Detected  ng/mg creat Final   • BUPRENORPHINE 04/29/2019 Negative   Final   • Buprenorphine, Urine 04/29/2019 Not Detected  ng/mg creat Final   • Norbuprenorphine 04/29/2019 Not Detected  ng/mg creat Final   • BARBITURATES, URINE 04/29/2019 Negative   Final   • Amobarbital, Urine 04/29/2019 Not Detected   Final   • Barbital 04/29/2019 Not Detected   Final   • Butabarbital, Ur 04/29/2019 Not Detected   Final   • Butalbital Screen, Urine 04/29/2019 Not Detected   Final   • Mephobarbital Urine 04/29/2019 Not Detected   Final   • Pentobarbital UR 04/29/2019 Not Detected   Final   • Phenobarbital 04/29/2019 Not Detected   Final   • Secobarbital, urine 04/29/2019 Not Detected   Final   • Thiopental, Ur 04/29/2019 Not Detected   Final   • Tapentadol 04/29/2019 Negative   Final   • Tapentadol Ur 04/29/2019 Not Detected  ng/mg creat Final   • Opoidss other, UR 04/29/2019 Negative   Final   • Tramadol, Urine 04/29/2019 Not Detected  ng/mg creat Final   • O-desmethyltramadol, Ur 04/29/2019 Not Detected  ng/mg creat Final   • N-Desmethyltramadol, U 04/29/2019 Not Detected  ng/mg creat Final   • Creatinine, Urine 04/29/2019 120  mg/dL Final    REFERENCE RANGE: Ref Range>=20   • Declared Medications: 04/29/2019 Comment   Final    Not Provided  Medication list was not provided.   • Level of Detection: 04/29/2019 Comment   Final    Level of detection:  TESTING THRESHOLDS ARE AS FOLLOWS:  AMPHETAMINES: 50 ng/mL  BENZODIAZEPINES: 20 ng/mL  COCAINE / METABOLITE: 50 ng/mL  ALCOHOL, ETHYL: 0.020 gm/dL  FENTANYL / ANALOGUES: fentanyl-1.0 ng/mL, others-5.0 ng/mL  BUPRENORPHINE: buprenorphine-1.0 ng/mL,                 norbuprenorphine-5 ng/mL  TAPENTADOL: 50 ng/mL  CANNABINOIDS: total-20 ng/mL, carboxy-THC-2 ng/mL  METHADONE: 50 ng/mL  OPIATE CLASS: 50 ng/mL  OXYCODONE CLASS: 50 ng/mL  BARBITURATES: 200 ng/mL  TRAMADOL: 50 ng/mL  This test was developed and its performance  characteristics  determined by [facility].  It has not been cleared or approved  by the Food and Drug Administration.   Office Visit on 02/21/2019   Component Date Value Ref Range Status   • WBC 02/21/2019 10.53* 3.20 - 9.80 10*3/mm3 Final   • RBC 02/21/2019 5.30  4.37 - 5.74 10*6/mm3 Final   • Hemoglobin 02/21/2019 17.1  13.7 - 17.3 g/dL Final   • Hematocrit 02/21/2019 50.0* 39.0 - 49.0 % Final   • MCV 02/21/2019 94.3  80.0 - 98.0 fL Final   • MCH 02/21/2019 32.3  26.5 - 34.0 pg Final   • MCHC 02/21/2019 34.2  31.5 - 36.3 g/dL Final   • RDW 02/21/2019 14.0  11.5 - 14.5 % Final   • RDW-SD 02/21/2019 47.5* 35.1 - 43.9 fl Final   • MPV 02/21/2019 9.1  8.0 - 12.0 fL Final   • Platelets 02/21/2019 303  150 - 450 10*3/mm3 Final   • Neutrophil % 02/21/2019 75.4  37.0 - 80.0 % Final   • Lymphocyte % 02/21/2019 11.5  10.0 - 50.0 % Final   • Monocyte % 02/21/2019 10.3  0.0 - 12.0 % Final   • Eosinophil % 02/21/2019 2.5  0.0 - 7.0 % Final   • Basophil % 02/21/2019 0.3  0.0 - 2.0 % Final   • Neutrophils, Absolute 02/21/2019 7.95  2.00 - 8.60 10*3/mm3 Final   • Lymphocytes, Absolute 02/21/2019 1.21  0.60 - 4.20 10*3/mm3 Final   • Monocytes, Absolute 02/21/2019 1.08* 0.00 - 0.90 10*3/mm3 Final   • Eosinophils, Absolute 02/21/2019 0.26  0.00 - 0.70 10*3/mm3 Final   • Basophils, Absolute 02/21/2019 0.03  0.00 - 0.20 10*3/mm3 Final   ]

## 2019-05-17 NOTE — PATIENT INSTRUCTIONS
See me in 4 weeks for pain management   see me in 6 months for cholesterol, blood pressure, labs-- have labs done 2-3 days before.

## 2019-06-03 DIAGNOSIS — E78.5 DYSLIPIDEMIA: ICD-10-CM

## 2019-06-03 RX ORDER — PRAVASTATIN SODIUM 20 MG
20 TABLET ORAL
Qty: 30 TABLET | Refills: 5 | Status: SHIPPED | OUTPATIENT
Start: 2019-06-03 | End: 2019-11-15 | Stop reason: DRUGHIGH

## 2019-06-17 ENCOUNTER — OFFICE VISIT (OUTPATIENT)
Dept: FAMILY MEDICINE CLINIC | Facility: CLINIC | Age: 64
End: 2019-06-17

## 2019-06-17 VITALS
SYSTOLIC BLOOD PRESSURE: 130 MMHG | RESPIRATION RATE: 16 BRPM | HEART RATE: 78 BPM | OXYGEN SATURATION: 97 % | TEMPERATURE: 97.8 F | HEIGHT: 72 IN | WEIGHT: 156.2 LBS | DIASTOLIC BLOOD PRESSURE: 80 MMHG | BODY MASS INDEX: 21.16 KG/M2

## 2019-06-17 DIAGNOSIS — G89.29 CHRONIC MIDLINE LOW BACK PAIN WITHOUT SCIATICA: Chronic | ICD-10-CM

## 2019-06-17 DIAGNOSIS — M54.50 CHRONIC MIDLINE LOW BACK PAIN WITHOUT SCIATICA: Chronic | ICD-10-CM

## 2019-06-17 DIAGNOSIS — Z79.899 ENCOUNTER FOR LONG-TERM CURRENT USE OF MEDICATION: ICD-10-CM

## 2019-06-17 DIAGNOSIS — I10 ESSENTIAL HYPERTENSION: Primary | Chronic | ICD-10-CM

## 2019-06-17 PROCEDURE — 99214 OFFICE O/P EST MOD 30 MIN: CPT | Performed by: NURSE PRACTITIONER

## 2019-06-17 RX ORDER — HYDROCODONE BITARTRATE AND ACETAMINOPHEN 7.5; 325 MG/1; MG/1
1 TABLET ORAL EVERY 8 HOURS PRN
Qty: 90 TABLET | Refills: 0 | Status: SHIPPED | OUTPATIENT
Start: 2019-06-17 | End: 2019-07-18 | Stop reason: SDUPTHER

## 2019-06-17 NOTE — PROGRESS NOTES
"Chief Complaint   Patient presents with   • Back Pain     1 mo f/u, med refills     Subjective   Derrell Bynum is a 64 y.o. male who presents to the office for chronic pain management    The following portions of the patient's history were reviewed and updated as appropriate: allergies, current medications, past family history, past medical history, past social history, past surgical history and problem list.    History of Present Illness   Last fasting lipids 5/14/19 WNL   4/29/19 UDS appropriate  A1C 11/29/19 5.6%  CMP 11/29/18 glucose 116  CBC 5/14/19 WNL     chronic lower back pain.  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 Location is midline/ bilateral lower back, radiating down left leg at times. Onset gradual and gradually worsening over the years. Pain is aching. It is steady/ constant, worsens with twisting or lifing. Pain is consistently present through day and night. Stiffness is worse in the morning. Pain is rated at 9/10 today.This waxes and wanes with activity.  Denies numbness or weakness of legs. He reports a shooting tingling running down left buttock along outer left leg to foot. Feels \"like my foot is going to sleep\".  This radicular pain only occurs intermittently, once every 2-3 weeks, lasting about 10 minutes when it does occur.  States it improves with rest and rubbing his foot/leg. Reports this sensation occurs more frequently during cold weather. There is no loss of bowel / bladder control. He denies difficulty with moving bowels, reports one bowel movement daily without the use of stool softeners or laxatives. Due for refill today     Pain is managed to his satisfaction with three doses of lortab per day. States pain is worst in the morning, improves after first dose of Lortab. MRI last completed in 12/2018. Continues to decline referral to neurosurgery at this time to discuss surgical options for repair at this time.  He elects " to continue waiting and watching for symptoms for now. Discussed signs and symptoms that would require a call to provider or emergent evaluation. Verbalized understanding. Refill of hydrocodone issued, Follow up in 4 weeks.     COPD: chronic, stable symptoms managed well with Symbicort, Breo Ellipta and albuterol nebs. Complicated with chronic allergic rhinitis, managed well with montelucast and loratadine daily.      Current every day smoker/ personal history of nicotine addiction: not motivated to quit. Counseling x 5 minutes.Not motivated to quit.   Mixed hyperlipidemia: over due for labs, schedule today. Controlled with pravastatin 20 mg daily. Usually compliant with dietary guidelines. Labs were normal with HDL of 129. On pravastatin, dose reduced after 5/17/19 labs, repeat labs in November due to decreased med.     HTN: not quite at goal today, but better, if again high in July, will discuss medication changes.     Past Medical History:   Diagnosis Date   • Acute bacterial sinusitis    • Acute pharyngitis    • Acute sinusitis    • Allergic rhinitis    • Chronic obstructive pulmonary disease with acute lower respiratory infection (CMS/HCC)    • Chronic sinusitis    • Coalworker's pneumoconiosis (CMS/HCC)    • Contact dermatitis    • COPD (chronic obstructive pulmonary disease) (CMS/HCC)    • Cough    • Dyspnea    • Encounter for immunization    • Erythrocytosis    • Essential hypertension    • Fever    • Hemoptysis    • Hoarse    • Hyperlipidemia    • Insect bite, nonvenomous of shoulder and upper arm, infected    • Malaise and fatigue     Other   • Need for immunization against influenza    • Osteoarthritis     Chronic daily pain   • Smokes tobacco daily    • Tobacco dependence syndrome    • Viral gastroenteritis 10/10/2013    Resolved   • Wheezing           Family History   Problem Relation Age of Onset   • Colon cancer Father         Colorectal Cancer   • Diabetes Father         Review of Systems  "  Constitutional: Negative.  Negative for activity change, fever and unexpected weight change.   HENT: Negative.    Eyes: Negative.    Respiratory: Negative.  Negative for cough, chest tightness and shortness of breath.    Cardiovascular: Negative.  Negative for chest pain.   Gastrointestinal: Negative.  Negative for constipation.   Endocrine: Negative.    Genitourinary: Negative.  Negative for difficulty urinating and dysuria.   Musculoskeletal: Positive for back pain.   Skin: Negative.  Negative for color change, pallor, rash and wound.   Allergic/Immunologic: Negative.    Neurological: Negative.  Negative for weakness and numbness.   Hematological: Negative.    Psychiatric/Behavioral: Negative.  Negative for sleep disturbance and suicidal ideas.   All other systems reviewed and are negative.      Objective   Vitals:    06/17/19 0816   BP: 130/80   BP Location: Left arm   Patient Position: Sitting   Cuff Size: Adult   Pulse: 78   Resp: 16   Temp: 97.8 °F (36.6 °C)   TempSrc: Oral   SpO2: 97%   Weight: 70.9 kg (156 lb 3.2 oz)   Height: 183.4 cm (72.2\")   PainSc:   9   PainLoc: Back     Physical Exam   Constitutional: He is oriented to person, place, and time. He appears well-developed and well-nourished.   HENT:   Head: Normocephalic and atraumatic.   Eyes: Conjunctivae are normal. Pupils are equal, round, and reactive to light.   Neck: Normal range of motion.   Cardiovascular: Normal rate, regular rhythm, normal heart sounds and intact distal pulses. Exam reveals no gallop and no friction rub.   No murmur heard.  Pulmonary/Chest: Effort normal and breath sounds normal. No respiratory distress. He has no wheezes. He has no rales. He exhibits no tenderness.   Abdominal: Soft. Bowel sounds are normal. He exhibits no distension and no mass. There is no tenderness. There is no rebound and no guarding.   Musculoskeletal: He exhibits tenderness. He exhibits no edema or deformity.        Lumbar back: He exhibits decreased " range of motion, tenderness, bony tenderness and pain.        Back:    Spinal and paraspinal tenderness throughout lumbar region   Lymphadenopathy:     He has no cervical adenopathy.   Neurological: He is alert and oriented to person, place, and time.   Skin: Skin is warm and dry. Capillary refill takes 2 to 3 seconds. No erythema. No pallor.   Psychiatric: He has a normal mood and affect. His behavior is normal. Judgment and thought content normal.   Nursing note and vitals reviewed.      Assessment/Plan   Derrell was seen today for back pain.    Diagnoses and all orders for this visit:    Essential hypertension  Comments:  not quite at goal today, if still above target in July, will discuss medication changes.     Chronic midline low back pain without sciatica  Comments:  controlled to stated satisfaction today. able to garden and work as needed, and sleep without waking due to pain.   Orders:  -     HYDROcodone-acetaminophen (LORTAB) 7.5-325 MG per tablet; Take 1 tablet by mouth Every 8 (Eight) Hours As Needed for Moderate Pain . Fill when due    Encounter for long-term current use of medication  -     HYDROcodone-acetaminophen (LORTAB) 7.5-325 MG per tablet; Take 1 tablet by mouth Every 8 (Eight) Hours As Needed for Moderate Pain . Fill when due           PHQ-2/PHQ-9 Depression Screening 6/17/2019   Little interest or pleasure in doing things 0   Feeling down, depressed, or hopeless 0   Total Score 0   Patient understands the risks associated with this controlled medication, including tolerance and addiction.  Patient also agrees to only obtain this medication from me, and not from a another provider, unless that provider is covering for me in my absence.  Patient also agrees to be compliant in dosing, and not self adjust the dose of medication.  A signed controlled substance agreement is on file, and the patient has received a controlled substance education sheet at this a previous visit.  The patient has also  signed a consent for treatment with a controlled substance as per [facility] policy. TERRY was obtained.      JASMINE Noel         Return in about 4 weeks (around 7/15/2019).    There are no Patient Instructions on file for this visit.

## 2019-06-28 DIAGNOSIS — M62.838 MUSCLE SPASM: ICD-10-CM

## 2019-06-28 RX ORDER — TIZANIDINE 2 MG/1
2 TABLET ORAL EVERY 8 HOURS PRN
Qty: 30 TABLET | Refills: 5 | Status: SHIPPED | OUTPATIENT
Start: 2019-06-28 | End: 2019-09-12 | Stop reason: SDUPTHER

## 2019-07-18 ENCOUNTER — OFFICE VISIT (OUTPATIENT)
Dept: FAMILY MEDICINE CLINIC | Facility: CLINIC | Age: 64
End: 2019-07-18

## 2019-07-18 VITALS
RESPIRATION RATE: 16 BRPM | WEIGHT: 147.9 LBS | TEMPERATURE: 98.8 F | OXYGEN SATURATION: 95 % | SYSTOLIC BLOOD PRESSURE: 126 MMHG | HEIGHT: 72 IN | BODY MASS INDEX: 20.03 KG/M2 | HEART RATE: 76 BPM | DIASTOLIC BLOOD PRESSURE: 80 MMHG

## 2019-07-18 DIAGNOSIS — G89.29 CHRONIC MIDLINE LOW BACK PAIN WITHOUT SCIATICA: Chronic | ICD-10-CM

## 2019-07-18 DIAGNOSIS — M54.50 CHRONIC MIDLINE LOW BACK PAIN WITHOUT SCIATICA: Chronic | ICD-10-CM

## 2019-07-18 DIAGNOSIS — Z79.899 ENCOUNTER FOR LONG-TERM CURRENT USE OF MEDICATION: ICD-10-CM

## 2019-07-18 PROCEDURE — 99214 OFFICE O/P EST MOD 30 MIN: CPT | Performed by: NURSE PRACTITIONER

## 2019-07-18 RX ORDER — HYDROCODONE BITARTRATE AND ACETAMINOPHEN 7.5; 325 MG/1; MG/1
1 TABLET ORAL EVERY 8 HOURS PRN
Qty: 90 TABLET | Refills: 0 | Status: SHIPPED | OUTPATIENT
Start: 2019-07-18 | End: 2019-08-15 | Stop reason: SDUPTHER

## 2019-07-18 NOTE — PROGRESS NOTES
"Chief Complaint   Patient presents with   • Back Pain     Subjective   Derrell Bynum is a 64 y.o. male who presents to the office for chronic pain management.    The following portions of the patient's history were reviewed and updated as appropriate: allergies, current medications, past family history, past medical history, past social history, past surgical history and problem list.    History of Present Illness   UDS - 4-29-19- Appropriate    Chronic Lower Back Pain.  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 Location is midline/ bilateral lower back, radiating down left leg at times. Onset gradual and gradually worsening over the years. Pain is aching. It is steady/ constant, worsens with twisting or lifing. Pain is consistently present through day and night. Stiffness is worse in the morning. Pain is rated at 9/10 today and says it is a 9/10 on a 'good day'.This waxes and wanes with activity. At times with hydrocodone pain gets as low as a 7, which patient states \"I can deal with\", and feels is well controlled currently.   Denies numbness or weakness of legs. He reports a shooting tingling running down left buttock along outer left leg to foot. Feels \"like my foot is going to sleep\".  This radicular pain only occurs intermittently, once every 2-3 weeks, lasting about 10 minutes when it does occur. Patient says this sort of pain has not happened in a month.  States it improves with rest and rubbing his foot/leg. Reports this sensation occurs more frequently during cold weather. There is no loss of bowel / bladder control. He denies difficulty with moving bowels, reports one bowel movement daily without the use of stool softeners or laxatives. Due for refill today     Pain is managed to his satisfaction with three doses of lortab per day. States pain is worst in the morning, improves after first dose of Lortab. MRI last completed in 12/2018. Continues to " decline referral to neurosurgery at this time to discuss surgical options for repair at this time.  He elects to continue waiting and watching for symptoms for now. Discussed signs and symptoms that would require a call to provider or emergent evaluation. Verbalized understanding. Refill of hydrocodone issued, Follow up in 4 weeks.          Past Medical History:   Diagnosis Date   • Acute bacterial sinusitis    • Acute pharyngitis    • Acute sinusitis    • Allergic rhinitis    • Chronic obstructive pulmonary disease with acute lower respiratory infection (CMS/HCC)    • Chronic sinusitis    • Coalworker's pneumoconiosis (CMS/HCC)    • Contact dermatitis    • COPD (chronic obstructive pulmonary disease) (CMS/HCC)    • Cough    • Dyspnea    • Encounter for immunization    • Erythrocytosis    • Essential hypertension    • Fever    • Hemoptysis    • Hoarse    • Hyperlipidemia    • Insect bite, nonvenomous of shoulder and upper arm, infected    • Malaise and fatigue     Other   • Need for immunization against influenza    • Osteoarthritis     Chronic daily pain   • Smokes tobacco daily    • Tobacco dependence syndrome    • Viral gastroenteritis 10/10/2013    Resolved   • Wheezing           Family History   Problem Relation Age of Onset   • Colon cancer Father         Colorectal Cancer   • Diabetes Father         Review of Systems   Constitutional: Negative.  Negative for activity change, fever and unexpected weight change.   HENT: Negative.    Eyes: Negative.    Respiratory: Negative.  Negative for cough, chest tightness and shortness of breath.    Cardiovascular: Negative.  Negative for chest pain.   Gastrointestinal: Negative.  Negative for constipation.   Endocrine: Negative.    Genitourinary: Negative.  Negative for difficulty urinating and dysuria.   Musculoskeletal: Positive for back pain.   Skin: Negative.  Negative for color change, pallor, rash and wound.   Allergic/Immunologic: Negative.    Neurological:  "Negative.  Negative for weakness and numbness.   Hematological: Negative.    Psychiatric/Behavioral: Negative.  Negative for sleep disturbance and suicidal ideas.   All other systems reviewed and are negative.      Objective   Vitals:    07/18/19 0820   BP: 126/80   BP Location: Left arm   Patient Position: Sitting   Cuff Size: Adult   Pulse: 76   Resp: 16   Temp: 98.8 °F (37.1 °C)   TempSrc: Tympanic   SpO2: 95%   Weight: 67.1 kg (147 lb 14.4 oz)   Height: 182.9 cm (72\")     Physical Exam   Constitutional: He is oriented to person, place, and time. He appears well-developed and well-nourished.   HENT:   Head: Normocephalic and atraumatic.   Eyes: Conjunctivae are normal. Pupils are equal, round, and reactive to light.   Neck: Normal range of motion.   Cardiovascular: Normal rate, regular rhythm, normal heart sounds and intact distal pulses. Exam reveals no gallop and no friction rub.   No murmur heard.  Pulmonary/Chest: Effort normal and breath sounds normal. No respiratory distress. He has no wheezes. He has no rales. He exhibits no tenderness.   Abdominal: Soft. Bowel sounds are normal. He exhibits no distension and no mass. There is no tenderness. There is no rebound and no guarding.   Musculoskeletal: He exhibits tenderness. He exhibits no edema or deformity.        Lumbar back: He exhibits decreased range of motion, tenderness, bony tenderness and pain.        Back:    Spinal and paraspinal tenderness throughout lumbar region   Lymphadenopathy:     He has no cervical adenopathy.   Neurological: He is alert and oriented to person, place, and time.   Skin: Skin is warm and dry. Capillary refill takes 2 to 3 seconds. No erythema. No pallor.   Psychiatric: He has a normal mood and affect. His behavior is normal. Judgment and thought content normal.   Nursing note and vitals reviewed.      Assessment/Plan   Derrell was seen today for back pain.    Diagnoses and all orders for this visit:    Chronic midline low back " pain without sciatica  Comments:  controlled to stated satisfaction today. able to garden and work as needed, and sleep without waking due to pain.   Orders:  -     HYDROcodone-acetaminophen (LORTAB) 7.5-325 MG per tablet; Take 1 tablet by mouth Every 8 (Eight) Hours As Needed for Moderate Pain . Fill when due    Encounter for long-term current use of medication  -     HYDROcodone-acetaminophen (LORTAB) 7.5-325 MG per tablet; Take 1 tablet by mouth Every 8 (Eight) Hours As Needed for Moderate Pain . Fill when due           PHQ-2/PHQ-9 Depression Screening 6/17/2019   Little interest or pleasure in doing things 0   Feeling down, depressed, or hopeless 0   Total Score 0     Patient understands the risks associated with this controlled medication, including tolerance and addiction.  Patient also agrees to only obtain this medication from me, and not from a another provider, unless that provider is covering for me in my absence.  Patient also agrees to be compliant in dosing, and not self adjust the dose of medication.  A signed controlled substance agreement is on file, and the patient has received a controlled substance education sheet at this a previous visit.  The patient has also signed a consent for treatment with a controlled substance as per [facility] policy. TERRY was obtained.  JASMINE Noel         Return in about 4 weeks (around 8/15/2019).    There are no Patient Instructions on file for this visit.

## 2019-08-15 ENCOUNTER — OFFICE VISIT (OUTPATIENT)
Dept: FAMILY MEDICINE CLINIC | Facility: CLINIC | Age: 64
End: 2019-08-15

## 2019-08-15 VITALS
RESPIRATION RATE: 16 BRPM | HEART RATE: 71 BPM | WEIGHT: 151.3 LBS | HEIGHT: 72 IN | SYSTOLIC BLOOD PRESSURE: 124 MMHG | OXYGEN SATURATION: 97 % | BODY MASS INDEX: 20.49 KG/M2 | TEMPERATURE: 98.7 F | DIASTOLIC BLOOD PRESSURE: 78 MMHG

## 2019-08-15 DIAGNOSIS — Z79.899 ENCOUNTER FOR LONG-TERM CURRENT USE OF MEDICATION: ICD-10-CM

## 2019-08-15 DIAGNOSIS — M54.50 CHRONIC MIDLINE LOW BACK PAIN WITHOUT SCIATICA: Primary | Chronic | ICD-10-CM

## 2019-08-15 DIAGNOSIS — G89.29 CHRONIC MIDLINE LOW BACK PAIN WITHOUT SCIATICA: Primary | Chronic | ICD-10-CM

## 2019-08-15 PROCEDURE — 99214 OFFICE O/P EST MOD 30 MIN: CPT | Performed by: NURSE PRACTITIONER

## 2019-08-15 RX ORDER — HYDROCODONE BITARTRATE AND ACETAMINOPHEN 7.5; 325 MG/1; MG/1
1 TABLET ORAL EVERY 6 HOURS PRN
Qty: 120 TABLET | Refills: 0 | Status: SHIPPED | OUTPATIENT
Start: 2019-08-15 | End: 2019-09-12 | Stop reason: SDUPTHER

## 2019-08-20 NOTE — PROGRESS NOTES
"Chief Complaint   Patient presents with   • chronic back pain     4 week f/u med refills     Subjective   Derrell Bynum is a 64 y.o. male who presents to the office for monthly follow up of chronic pain.    The following portions of the patient's history were reviewed and updated as appropriate: allergies, current medications, past family history, past medical history, past social history, past surgical history and problem list.    History of Present Illness   UDS - 4-29-19- Appropriate  Fasting labs     Chronic Lower Back Pain.  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 Location is midline/ bilateral lower back, radiating down left leg at times. Onset gradual and gradually worsening over the years. Pain is aching. It is steady/ constant, worsens with twisting or lifing. Pain is consistently present through day and night. Stiffness is worse in the morning. Pain is rated at 10/10 today and says it is a 9/10 on a 'good day'.This waxes and wanes with activity. At times with hydrocodone pain gets as low as a 7, which patient states \"I can deal with\", and feels is well controlled currently.   Denies numbness or weakness of legs. He reports a shooting tingling running down left buttock along outer left leg to foot. Feels \"like my foot is going to sleep\".  This radicular pain only occurs intermittently, once every 2-3 weeks, lasting about 10 minutes when it does occur. . There is no loss of bowel / bladder control. He denies difficulty with moving bowels, reports one bowel movement daily without the use of stool softeners or laxatives. Due for refill today  Patient reports that in the past month, his pain med wears off an hour or so before it should, experiencing significant pain between doses. Requests increase from Lortab 7.5mg #90 to #120 today.     MRI last completed in 12/2018. Continues to decline referral to neurosurgery at this time to discuss surgical options " for repair at this time.  He elects to continue waiting and watching for symptoms for now. Discussed signs and symptoms that would require a call to provider or emergent evaluation. Verbalized understanding. Refill of hydrocodone issued, Follow up in 4 weeks.     Past Medical History:   Diagnosis Date   • Acute bacterial sinusitis    • Acute pharyngitis    • Acute sinusitis    • Allergic rhinitis    • Chronic obstructive pulmonary disease with acute lower respiratory infection (CMS/HCC)    • Chronic sinusitis    • Coalworker's pneumoconiosis (CMS/HCC)    • Contact dermatitis    • COPD (chronic obstructive pulmonary disease) (CMS/HCC)    • Cough    • Dyspnea    • Encounter for immunization    • Erythrocytosis    • Essential hypertension    • Fever    • Hemoptysis    • Hoarse    • Hyperlipidemia    • Insect bite, nonvenomous of shoulder and upper arm, infected    • Malaise and fatigue     Other   • Need for immunization against influenza    • Osteoarthritis     Chronic daily pain   • Smokes tobacco daily    • Tobacco dependence syndrome    • Viral gastroenteritis 10/10/2013    Resolved   • Wheezing         Family History   Problem Relation Age of Onset   • Colon cancer Father         Colorectal Cancer   • Diabetes Father         Review of Systems   Constitutional: Negative.  Negative for activity change, fever and unexpected weight change.   HENT: Negative.    Eyes: Negative.    Respiratory: Negative.  Negative for cough, chest tightness and shortness of breath.    Cardiovascular: Negative.  Negative for chest pain.   Gastrointestinal: Negative.  Negative for constipation.   Endocrine: Negative.    Genitourinary: Negative.  Negative for difficulty urinating and dysuria.   Musculoskeletal: Positive for back pain.   Skin: Negative.  Negative for color change, pallor, rash and wound.   Allergic/Immunologic: Negative.    Neurological: Negative.  Negative for weakness and numbness.   Hematological: Negative.   "  Psychiatric/Behavioral: Negative.  Negative for sleep disturbance and suicidal ideas.   All other systems reviewed and are negative.      Objective   Vitals:    08/15/19 0822   BP: 124/78   BP Location: Left arm   Patient Position: Sitting   Cuff Size: Adult   Pulse: 71   Resp: 16   Temp: 98.7 °F (37.1 °C)   TempSrc: Tympanic   SpO2: 97%   Weight: 68.6 kg (151 lb 4.8 oz)   Height: 182.9 cm (72\")   PainSc: 10-Worst pain ever   PainLoc: Back     Physical Exam   Constitutional: He is oriented to person, place, and time. He appears well-developed and well-nourished.   HENT:   Head: Normocephalic and atraumatic.   Eyes: Conjunctivae are normal. Pupils are equal, round, and reactive to light.   Neck: Normal range of motion.   Cardiovascular: Normal rate, regular rhythm, normal heart sounds and intact distal pulses. Exam reveals no gallop and no friction rub.   No murmur heard.  Pulmonary/Chest: Effort normal and breath sounds normal. No respiratory distress. He has no wheezes. He has no rales. He exhibits no tenderness.   Abdominal: Soft. Bowel sounds are normal. He exhibits no distension and no mass. There is no tenderness. There is no rebound and no guarding.   Musculoskeletal: He exhibits tenderness. He exhibits no edema or deformity.        Lumbar back: He exhibits decreased range of motion, tenderness, bony tenderness and pain.        Back:    Spinal and paraspinal tenderness throughout lumbar region   Lymphadenopathy:     He has no cervical adenopathy.   Neurological: He is alert and oriented to person, place, and time.   Skin: Skin is warm and dry. Capillary refill takes 2 to 3 seconds. No erythema. No pallor.   Psychiatric: He has a normal mood and affect. His behavior is normal. Judgment and thought content normal.   Nursing note and vitals reviewed.      Assessment/Plan   Derrell was seen today for chronic back pain.    Diagnoses and all orders for this visit:    Chronic midline low back pain without " sciatica  Comments:  controlled to stated satisfaction today. able to garden and work as needed, and sleep without waking due to pain.   Orders:  -     HYDROcodone-acetaminophen (LORTAB) 7.5-325 MG per tablet; Take 1 tablet by mouth Every 6 (Six) Hours As Needed for Moderate Pain . Fill when due    Encounter for long-term current use of medication  -     HYDROcodone-acetaminophen (LORTAB) 7.5-325 MG per tablet; Take 1 tablet by mouth Every 6 (Six) Hours As Needed for Moderate Pain . Fill when due           PHQ-2/PHQ-9 Depression Screening 6/17/2019   Little interest or pleasure in doing things 0   Feeling down, depressed, or hopeless 0   Total Score 0       Crystal L Guzman APRLEONEL         Return in about 3 months (around 11/15/2019).    There are no Patient Instructions on file for this visit.    Lab on 04/29/2019   Component Date Value Ref Range Status   • WBC 05/14/2019 8.40  3.40 - 10.80 10*3/mm3 Final   • RBC 05/14/2019 5.24  4.14 - 5.80 10*6/mm3 Final   • Hemoglobin 05/14/2019 17.4  13.0 - 17.7 g/dL Final   • Hematocrit 05/14/2019 50.2  37.5 - 51.0 % Final   • MCV 05/14/2019 95.8  79.0 - 97.0 fL Final   • MCH 05/14/2019 33.2* 26.6 - 33.0 pg Final   • MCHC 05/14/2019 34.7  31.5 - 35.7 g/dL Final   • RDW 05/14/2019 13.5  12.3 - 15.4 % Final   • RDW-SD 05/14/2019 46.7  37.0 - 54.0 fl Final   • MPV 05/14/2019 9.4  6.0 - 12.0 fL Final   • Platelets 05/14/2019 287  140 - 450 10*3/mm3 Final   • Neutrophil % 05/14/2019 70.5  42.7 - 76.0 % Final   • Lymphocyte % 05/14/2019 16.1* 19.6 - 45.3 % Final   • Monocyte % 05/14/2019 10.2  5.0 - 12.0 % Final   • Eosinophil % 05/14/2019 3.0  0.3 - 6.2 % Final   • Basophil % 05/14/2019 0.2  0.0 - 1.5 % Final   • Neutrophils, Absolute 05/14/2019 5.92  1.70 - 7.00 10*3/mm3 Final   • Lymphocytes, Absolute 05/14/2019 1.35  0.70 - 3.10 10*3/mm3 Final   • Monocytes, Absolute 05/14/2019 0.86  0.10 - 0.90 10*3/mm3 Final   • Eosinophils, Absolute 05/14/2019 0.25  0.00 - 0.40 10*3/mm3 Final   •  Basophils, Absolute 05/14/2019 0.02  0.00 - 0.20 10*3/mm3 Final   Office Visit on 04/18/2019   Component Date Value Ref Range Status   • Total Cholesterol 05/14/2019 191  150 - 200 mg/dL Final   • Triglycerides 05/14/2019 51  <=150 mg/dL Final   • HDL Cholesterol 05/14/2019 129* 40 - 59 mg/dL Final   • LDL Cholesterol  05/14/2019 52  <=100 mg/dL Final   • VLDL Cholesterol 05/14/2019 10.2  mg/dL Final   • LDL/HDL Ratio 05/14/2019 0.40  0.00 - 3.55 Final   • Report Summary 04/29/2019 FINAL   Final    Comment: ====================================================================  [facility] 13 [facility]  ====================================================================  Test                             Result       Flag       Units  Drug Present    Hydrocodone                    686                     ng/mg creat    Hydromorphone                  360                     ng/mg creat    Dihydrocodeine                 142                     ng/mg creat    Norhydrocodone                 779                     ng/mg creat     Sources of hydrocodone include scheduled prescription     medications. Hydromorphone, dihydrocodeine and norhydrocodone are     expected metabolites of hydrocodone. Hydromorphone and     dihydrocodeine are also available as scheduled prescription     medications.  ====================================================================  Test                      Result    Flag   Units      Ref Range    Creatinine              120              mg/dL                                 >=20  ====================================================================  Declared Medications:   Medication list was not provided.  ====================================================================  For clinical consultation, please call [phone].  ====================================================================   • Ethanol Screen Urine (Ref) 04/29/2019 Negative   Final   • Ethanol, Urine 04/29/2019 Not  Detected  g/dL Final   • Amphetamine, Urine Qual 04/29/2019 Negative   Final   • Methamphetamine, Urine 04/29/2019 Not Detected  ng/mg creat Final   • Amphetamine, Urine 04/29/2019 Not Detected  ng/mg creat Final   • MDMA URINE 04/29/2019 Not Detected  ng/mg creat Final   • MDA 04/29/2019 Not Detected  ng/mg creat Final   • Benzodiazepine Screen, Urine 04/29/2019 Negative   Final   • DIAZEPAM URINE QUANT (REF) 04/29/2019 Not Detected  ng/mg creat Final   • Desmethyldiazepam 04/29/2019 Not Detected  ng/mg creat Final   • Oxazepam, urine 04/29/2019 Not Detected  ng/mg creat Final   • Temazepam, urine 04/29/2019 Not Detected  ng/mg creat Final    Expected metabolism of benzodiazepine class drugs:   Parent Drug       Detected Metabolites   -----------       --------------------   Diazepam:         Desmethyldiazepam, Temazepam, Oxazepam   Chlordiazepoxide: Desmethyldiazepam, Oxazepam   Clorazepate:      Desmethyldiazepam, Oxazepam   Halazepam:        Desmethyldiazepam, Oxazepam   Temazepam:        Oxazepam   Oxazepam:         None   • ALPRAZOLAM 04/29/2019 Not Detected  ng/mg creat Final   • ALPHA-HYDROXYALPRAZOLAM UR, QT (RE* 04/29/2019 Not Detected  ng/mg creat Final   • DESALKLFLURAZEPAM UR QUANT (REF) 04/29/2019 Not Detected  ng/mg creat Final   • LORAZEPAM URINE QUANT (REF) 04/29/2019 Not Detected  ng/mg creat Final   • Alpha-hydroxytriazolam, Urine 04/29/2019 Not Detected  ng/mg creat Final   • Clonazepam Urine 04/29/2019 Not Detected  ng/mg creat Final   • 7-Aminoclonazepam Urine 04/29/2019 Not Detected  ng/mg creat Final   • MIDAZOLAM UR, QUANT (REF) 04/29/2019 Not Detected  ng/mg creat Final   • Cocaine & Metabolite 04/29/2019 Negative   Final   • Cocaine Screen, Urine 04/29/2019 Not Detected  ng/mg creat Final   • Benzoylecgonine, Urine 04/29/2019 Not Detected  ng/mg creat Final   • Cocaethylene Ur 04/29/2019 Not Detected  ng/mg creat Final   • THC, Urine 04/29/2019 Negative   Final   • Carboxy THC, Urine  04/29/2019 Not Detected  ng/mg creat Final   • Opiate Class 04/29/2019 +POSITIVE+   Final   • Codeine, Urine 04/29/2019 Not Detected  ng/mg creat Final   • Morphine, Urine 04/29/2019 Not Detected  ng/mg creat Final   • Norcodeine Ur 04/29/2019 Not Detected  ng/mg creat Final   • Hydrocodone UR 04/29/2019 686  ng/mg creat Final   • Hydromorphone Urine 04/29/2019 360  ng/mg creat Final   • Dihydrocodeine, Urine 04/29/2019 142  ng/mg creat Final   • Opiates, Norhydrocodone, Urine 04/29/2019 779  ng/mg creat Final    Expected metabolism of opiate class drugs:  Parent Drug       Detected Metabolites   -----------       --------------------   Codeine:          Major:  Morphine, Norcodeine                     Minor:  Hydrocodone, Hydromorphone,                             Dihydrocodeine, Norhydrocodone,                             Normorphine   Morphine:         Major:  Normorphine                     Minor:  Hydromorphone   Hydrocodone:      Hydromorphone, Dihydrocodeine,                     Norhydrocodone   Hydromorphone:    None   Dihydrocodeine:   None   Heroin:           6-Acetylmorphine (if included),                     Morphine, Normorphine                     Codeine, in small amounts in comparison                      to morphine, is often detected when                      heroin is the source drug.   • Oxycodone Class Ur 04/29/2019 Negative   Final   • Oxycodone, Confirmation, Urine 04/29/2019 Not Detected  ng/mg creat Final   • Oxymorphone UR 04/29/2019 Not Detected  ng/mg creat Final   • Opiates, Noroxycodone, Urine 04/29/2019 Not Detected  ng/mg creat Final   • Noroxymorphone 04/29/2019 Not Detected  ng/mg creat Final    Expected metabolism of oxycodone class drugs:   Parent Drug       Detected Metabolites   -----------       --------------------   Oxycodone:        Oxymorphone, Noroxycodone, Noroxymorphone   Oxymorphone:      Noroxymorphone   • Methadone 04/29/2019 Negative   Final   • Methadone,  Quantitative 04/29/2019 Not Detected  ng/mg creat Final   • EDDP 04/29/2019 Not Detected  ng/mg creat Final   • Fentanyl and Analogues, Ur 04/29/2019 Negative   Final   • Fentanyl, Urine 04/29/2019 Not Detected  ng/mg creat Final   • Norfentanyl Urine 04/29/2019 Not Detected  ng/mg creat Final   • Sufentanil, Ur 04/29/2019 Not Detected  ng/mg creat Final   • Alfentanil, Ur 04/29/2019 Not Detected  ng/mg creat Final   • BUPRENORPHINE 04/29/2019 Negative   Final   • Buprenorphine, Urine 04/29/2019 Not Detected  ng/mg creat Final   • Norbuprenorphine 04/29/2019 Not Detected  ng/mg creat Final   • BARBITURATES, URINE 04/29/2019 Negative   Final   • Amobarbital, Urine 04/29/2019 Not Detected   Final   • Barbital 04/29/2019 Not Detected   Final   • Butabarbital, Ur 04/29/2019 Not Detected   Final   • Butalbital Screen, Urine 04/29/2019 Not Detected   Final   • Mephobarbital Urine 04/29/2019 Not Detected   Final   • Pentobarbital UR 04/29/2019 Not Detected   Final   • Phenobarbital 04/29/2019 Not Detected   Final   • Secobarbital, urine 04/29/2019 Not Detected   Final   • Thiopental, Ur 04/29/2019 Not Detected   Final   • Tapentadol 04/29/2019 Negative   Final   • Tapentadol Ur 04/29/2019 Not Detected  ng/mg creat Final   • Opoidss other, UR 04/29/2019 Negative   Final   • Tramadol, Urine 04/29/2019 Not Detected  ng/mg creat Final   • O-desmethyltramadol, Ur 04/29/2019 Not Detected  ng/mg creat Final   • N-Desmethyltramadol, U 04/29/2019 Not Detected  ng/mg creat Final   • Creatinine, Urine 04/29/2019 120  mg/dL Final    REFERENCE RANGE: Ref Range>=20   • Declared Medications: 04/29/2019 Comment   Final    Not Provided  Medication list was not provided.   • Level of Detection: 04/29/2019 Comment   Final    Level of detection:  TESTING THRESHOLDS ARE AS FOLLOWS:  AMPHETAMINES: 50 ng/mL  BENZODIAZEPINES: 20 ng/mL  COCAINE / METABOLITE: 50 ng/mL  ALCOHOL, ETHYL: 0.020 gm/dL  FENTANYL / ANALOGUES: fentanyl-1.0 ng/mL,  others-5.0 ng/mL  BUPRENORPHINE: buprenorphine-1.0 ng/mL,                 norbuprenorphine-5 ng/mL  TAPENTADOL: 50 ng/mL  CANNABINOIDS: total-20 ng/mL, carboxy-THC-2 ng/mL  METHADONE: 50 ng/mL  OPIATE CLASS: 50 ng/mL  OXYCODONE CLASS: 50 ng/mL  BARBITURATES: 200 ng/mL  TRAMADOL: 50 ng/mL  This test was developed and its performance characteristics  determined by [facility].  It has not been cleared or approved  by the Food and Drug Administration.   ]

## 2019-09-11 DIAGNOSIS — G89.29 CHRONIC MIDLINE LOW BACK PAIN WITHOUT SCIATICA: Chronic | ICD-10-CM

## 2019-09-11 DIAGNOSIS — Z79.899 ENCOUNTER FOR LONG-TERM CURRENT USE OF MEDICATION: ICD-10-CM

## 2019-09-11 DIAGNOSIS — M54.50 CHRONIC MIDLINE LOW BACK PAIN WITHOUT SCIATICA: Chronic | ICD-10-CM

## 2019-09-11 RX ORDER — HYDROCODONE BITARTRATE AND ACETAMINOPHEN 7.5; 325 MG/1; MG/1
1 TABLET ORAL EVERY 6 HOURS PRN
Qty: 120 TABLET | Refills: 0 | Status: CANCELLED | OUTPATIENT
Start: 2019-09-11

## 2019-09-12 DIAGNOSIS — M54.50 CHRONIC MIDLINE LOW BACK PAIN WITHOUT SCIATICA: Chronic | ICD-10-CM

## 2019-09-12 DIAGNOSIS — G89.29 CHRONIC MIDLINE LOW BACK PAIN WITHOUT SCIATICA: Chronic | ICD-10-CM

## 2019-09-12 DIAGNOSIS — Z79.899 ENCOUNTER FOR LONG-TERM CURRENT USE OF MEDICATION: ICD-10-CM

## 2019-09-12 DIAGNOSIS — M62.838 MUSCLE SPASM: ICD-10-CM

## 2019-09-12 RX ORDER — HYDROCODONE BITARTRATE AND ACETAMINOPHEN 7.5; 325 MG/1; MG/1
1 TABLET ORAL EVERY 6 HOURS PRN
Qty: 120 TABLET | Refills: 0 | Status: SHIPPED | OUTPATIENT
Start: 2019-09-12 | End: 2019-10-10 | Stop reason: SDUPTHER

## 2019-09-12 RX ORDER — TIZANIDINE 2 MG/1
2 TABLET ORAL EVERY 8 HOURS PRN
Qty: 30 TABLET | Refills: 5 | Status: SHIPPED | OUTPATIENT
Start: 2019-09-12 | End: 2020-05-08 | Stop reason: SDUPTHER

## 2019-10-10 DIAGNOSIS — G89.29 CHRONIC MIDLINE LOW BACK PAIN WITHOUT SCIATICA: Chronic | ICD-10-CM

## 2019-10-10 DIAGNOSIS — M54.50 CHRONIC MIDLINE LOW BACK PAIN WITHOUT SCIATICA: Chronic | ICD-10-CM

## 2019-10-10 DIAGNOSIS — Z79.899 ENCOUNTER FOR LONG-TERM CURRENT USE OF MEDICATION: ICD-10-CM

## 2019-10-11 RX ORDER — HYDROCODONE BITARTRATE AND ACETAMINOPHEN 7.5; 325 MG/1; MG/1
1 TABLET ORAL EVERY 6 HOURS PRN
Qty: 120 TABLET | Refills: 0 | Status: SHIPPED | OUTPATIENT
Start: 2019-10-11 | End: 2019-11-15 | Stop reason: SDUPTHER

## 2019-10-25 ENCOUNTER — LAB (OUTPATIENT)
Dept: LAB | Facility: OTHER | Age: 64
End: 2019-10-25

## 2019-10-25 DIAGNOSIS — I10 ESSENTIAL HYPERTENSION: ICD-10-CM

## 2019-10-25 DIAGNOSIS — Z79.891 LONG TERM (CURRENT) USE OF OPIATE ANALGESIC: ICD-10-CM

## 2019-10-25 DIAGNOSIS — R73.9 HYPERGLYCEMIA: ICD-10-CM

## 2019-10-25 DIAGNOSIS — E78.5 DYSLIPIDEMIA: ICD-10-CM

## 2019-10-25 LAB
ALBUMIN SERPL-MCNC: 4.5 G/DL (ref 3.5–5)
ALBUMIN/GLOB SERPL: 1.5 G/DL (ref 1.1–1.8)
ALP SERPL-CCNC: 90 U/L (ref 38–126)
ALT SERPL W P-5'-P-CCNC: 32 U/L
ANION GAP SERPL CALCULATED.3IONS-SCNC: 8 MMOL/L (ref 5–15)
AST SERPL-CCNC: 39 U/L (ref 17–59)
BASOPHILS # BLD AUTO: 0.03 10*3/MM3 (ref 0–0.2)
BASOPHILS NFR BLD AUTO: 0.3 % (ref 0–1.5)
BILIRUB SERPL-MCNC: 0.7 MG/DL (ref 0.2–1.3)
BUN BLD-MCNC: 8 MG/DL (ref 7–23)
BUN/CREAT SERPL: 9.8 (ref 7–25)
CALCIUM SPEC-SCNC: 9.5 MG/DL (ref 8.4–10.2)
CHLORIDE SERPL-SCNC: 99 MMOL/L (ref 101–112)
CHOLEST SERPL-MCNC: 193 MG/DL (ref 150–200)
CO2 SERPL-SCNC: 29 MMOL/L (ref 22–30)
CREAT BLD-MCNC: 0.82 MG/DL (ref 0.7–1.3)
DEPRECATED RDW RBC AUTO: 47.6 FL (ref 37–54)
EOSINOPHIL # BLD AUTO: 0.28 10*3/MM3 (ref 0–0.4)
EOSINOPHIL NFR BLD AUTO: 2.9 % (ref 0.3–6.2)
ERYTHROCYTE [DISTWIDTH] IN BLOOD BY AUTOMATED COUNT: 13.6 % (ref 12.3–15.4)
GFR SERPL CREATININE-BSD FRML MDRD: 95 ML/MIN/1.73 (ref 49–113)
GLOBULIN UR ELPH-MCNC: 3.1 GM/DL (ref 2.3–3.5)
GLUCOSE BLD-MCNC: 118 MG/DL (ref 70–99)
HBA1C MFR BLD: 5.6 % (ref 4.8–5.6)
HCT VFR BLD AUTO: 53.5 % (ref 37.5–51)
HDLC SERPL-MCNC: 110 MG/DL (ref 40–59)
HGB BLD-MCNC: 18.3 G/DL (ref 13–17.7)
LDLC SERPL CALC-MCNC: 71 MG/DL
LDLC/HDLC SERPL: 0.64 {RATIO} (ref 0–3.55)
LYMPHOCYTES # BLD AUTO: 1.26 10*3/MM3 (ref 0.7–3.1)
LYMPHOCYTES NFR BLD AUTO: 13 % (ref 19.6–45.3)
MCH RBC QN AUTO: 32.7 PG (ref 26.6–33)
MCHC RBC AUTO-ENTMCNC: 34.2 G/DL (ref 31.5–35.7)
MCV RBC AUTO: 95.7 FL (ref 79–97)
MONOCYTES # BLD AUTO: 0.9 10*3/MM3 (ref 0.1–0.9)
MONOCYTES NFR BLD AUTO: 9.3 % (ref 5–12)
NEUTROPHILS # BLD AUTO: 7.21 10*3/MM3 (ref 1.7–7)
NEUTROPHILS NFR BLD AUTO: 74.5 % (ref 42.7–76)
PLATELET # BLD AUTO: 351 10*3/MM3 (ref 140–450)
PMV BLD AUTO: 9.2 FL (ref 6–12)
POTASSIUM BLD-SCNC: 4.6 MMOL/L (ref 3.4–5)
PROT SERPL-MCNC: 7.6 G/DL (ref 6.3–8.6)
RBC # BLD AUTO: 5.59 10*6/MM3 (ref 4.14–5.8)
SODIUM BLD-SCNC: 136 MMOL/L (ref 137–145)
TRIGL SERPL-MCNC: 61 MG/DL
VLDLC SERPL-MCNC: 12.2 MG/DL
WBC NRBC COR # BLD: 9.68 10*3/MM3 (ref 3.4–10.8)

## 2019-10-25 PROCEDURE — 83036 HEMOGLOBIN GLYCOSYLATED A1C: CPT | Performed by: NURSE PRACTITIONER

## 2019-10-25 PROCEDURE — 85025 COMPLETE CBC W/AUTO DIFF WBC: CPT | Performed by: NURSE PRACTITIONER

## 2019-10-25 PROCEDURE — 84443 ASSAY THYROID STIM HORMONE: CPT | Performed by: NURSE PRACTITIONER

## 2019-10-25 PROCEDURE — 84439 ASSAY OF FREE THYROXINE: CPT | Performed by: NURSE PRACTITIONER

## 2019-10-25 PROCEDURE — 84480 ASSAY TRIIODOTHYRONINE (T3): CPT | Performed by: NURSE PRACTITIONER

## 2019-10-25 PROCEDURE — G0481 DRUG TEST DEF 8-14 CLASSES: HCPCS | Performed by: NURSE PRACTITIONER

## 2019-10-25 PROCEDURE — 80307 DRUG TEST PRSMV CHEM ANLYZR: CPT | Performed by: NURSE PRACTITIONER

## 2019-10-25 PROCEDURE — 80053 COMPREHEN METABOLIC PANEL: CPT | Performed by: NURSE PRACTITIONER

## 2019-10-25 PROCEDURE — 80061 LIPID PANEL: CPT | Performed by: NURSE PRACTITIONER

## 2019-10-25 PROCEDURE — 36415 COLL VENOUS BLD VENIPUNCTURE: CPT | Performed by: NURSE PRACTITIONER

## 2019-10-26 LAB
T3 SERPL-MCNC: 115 NG/DL (ref 80–200)
T4 FREE SERPL-MCNC: 1.44 NG/DL (ref 0.93–1.7)
TSH SERPL DL<=0.05 MIU/L-ACNC: 0.85 UIU/ML (ref 0.27–4.2)

## 2019-10-29 DIAGNOSIS — J30.89 CHRONIC NONSEASONAL ALLERGIC RHINITIS DUE TO POLLEN: ICD-10-CM

## 2019-10-30 LAB
6-ACETYLMORPHINE: NEGATIVE
6MAM SERPLBLD-MCNC: NOT DETECTED NG/MG CREAT
7-AMINOCLONAZEPAM UR: NOT DETECTED NG/MG CREAT
A-OH ALPRAZ/CREAT UR: NOT DETECTED NG/MG CREAT
ALFENTANIL UR QL: NOT DETECTED NG/MG CREAT
ALPHA-HYDROXYMIDAZOLAM, URINE: NOT DETECTED NG/MG CREAT
ALPHA-HYDROXYTRIAZOLAM, URINE: NOT DETECTED NG/MG CREAT
AMOBARBITAL UR: NOT DETECTED
AMPHET UR QL CFM: NOT DETECTED NG/MG CREAT
AMPHETAMINES UR QL SCN: NEGATIVE
BARBITAL UR QL CFM: NOT DETECTED
BARBITURATES UR QL SCN: NEGATIVE
BENZODIAZ UR QL SCN: NEGATIVE
BENZOYLECGONINE UR: NOT DETECTED NG/MG CREAT
BUPRENORPHINE UR QL: NEGATIVE
BUPRENORPHINE UR QL: NOT DETECTED NG/MG CREAT
BUTABARBITAL UR QL: NOT DETECTED
BUTALBITAL UR QL: NOT DETECTED
CANNABINOIDS UR QL CFM: NEGATIVE
CLONAZEPAM UR QL: NOT DETECTED NG/MG CREAT
COCAETHYLENE UR QL CFM: NOT DETECTED NG/MG CREAT
COCAINE UR QL CFM: NEGATIVE
CODEINE UR QL: NOT DETECTED NG/MG CREAT
CONV COCAINE, UR: NOT DETECTED NG/MG CREAT
CONV REPORT SUMMARY: NORMAL
CREAT 24H UR-MCNC: 89 MG/DL
DESALKYLFLURAZ/CREAT UR: NOT DETECTED NG/MG CREAT
DESMETHYLFLUNITRAZEPAM: NOT DETECTED NG/MG CREAT
DIAZEPAM UR-MCNC: NOT DETECTED NG/MG CREAT
DIHYDROCODEINE UR: 321 NG/MG CREAT
EDDP SERPL QL: NOT DETECTED NG/MG CREAT
ETHANOL SCREEN URINE (REF): NEGATIVE
ETHANOL UR-MCNC: NOT DETECTED G/DL
FENTANYL UR QL: NEGATIVE
FENTANYL+NORFENTANYL UR QL SCN: NOT DETECTED NG/MG CREAT
FLUNITRAZEPAM SERPLBLD-MCNC: NOT DETECTED NG/MG CREAT
HYDROCODONE UR QL: 1201 NG/MG CREAT
HYDROMORPHONE UR QL: 811 NG/MG CREAT
LEVEL OF DETECTION:: NORMAL
LORAZEPAM UR-MCNC: NOT DETECTED NG/MG CREAT
LORAZEPAM/CREAT UR: NOT DETECTED NG/MG CREAT
MDA SERPLBLD-MCNC: NOT DETECTED NG/MG CREAT
MDMA UR QL SCN: NOT DETECTED NG/MG CREAT
MEPHOBARBITAL UR QL CFM: NOT DETECTED
METHADONE BLD QL SCN: NEGATIVE
METHADONE, URINE: NOT DETECTED NG/MG CREAT
METHAMPHETAMINE UR: NOT DETECTED NG/MG CREAT
MIDAZOLAM UR-MCNC: NOT DETECTED NG/MG CREAT
MORPHINE UR QL: NOT DETECTED NG/MG CREAT
N-DESMETHYLTRAMADOL, U: NOT DETECTED NG/MG CREAT
NARCOTICS UR: NEGATIVE
NORBUPRENORPHINE SERPLBLD-MCNC: NOT DETECTED NG/MG CREAT
NORCODEINE UR-MCNC: NOT DETECTED NG/MG CREAT
NORDIAZEPAM SERPL CFM-MCNC: NOT DETECTED NG/MG CREAT
NORFENTANYL UR: NOT DETECTED NG/MG CREAT
NORMORPHINE: NOT DETECTED NG/MG CREAT
NOROXYMORPHONE: NOT DETECTED NG/MG CREAT
O-DESMETHYLTRAMADOL, UR: NOT DETECTED NG/MG CREAT
OPIATES UR QL CFM: NORMAL
OPIATES, URINE, NORHYDROCODONE: 678 NG/MG CREAT
OPIATES, URINE, NOROXYCODONE: NOT DETECTED NG/MG CREAT
OXAZEPAM UR QL: NOT DETECTED NG/MG CREAT
OXYCODONE UR QL: NEGATIVE
OXYCODONE UR: NOT DETECTED NG/MG CREAT
OXYMORPHONE UR: NOT DETECTED NG/MG CREAT
PENTOBARBITAL UR: NOT DETECTED
PHENOBARB UR QL: NOT DETECTED
SECOBARBITAL UR QL: NOT DETECTED
SUFENTANIL UR QL: NOT DETECTED NG/MG CREAT
TAPENTADOL SERPLBLD-MCNC: NEGATIVE NG/ML
TAPENTADOL UR-MCNC: NOT DETECTED NG/MG CREAT
TEMAZEPAM UR QL CFM: NOT DETECTED NG/MG CREAT
THC UR CFM-MCNC: NOT DETECTED NG/MG CREAT
THIOPENTAL UR QL CFM: NOT DETECTED
TRAMADOL UR: NOT DETECTED NG/MG CREAT

## 2019-11-01 RX ORDER — MONTELUKAST SODIUM 10 MG/1
10 TABLET ORAL NIGHTLY
Qty: 30 TABLET | Refills: 5 | Status: SHIPPED | OUTPATIENT
Start: 2019-11-01 | End: 2020-05-08 | Stop reason: SDUPTHER

## 2019-11-15 ENCOUNTER — OFFICE VISIT (OUTPATIENT)
Dept: FAMILY MEDICINE CLINIC | Facility: CLINIC | Age: 64
End: 2019-11-15

## 2019-11-15 VITALS
OXYGEN SATURATION: 98 % | SYSTOLIC BLOOD PRESSURE: 122 MMHG | RESPIRATION RATE: 16 BRPM | HEIGHT: 72 IN | BODY MASS INDEX: 21.18 KG/M2 | TEMPERATURE: 98.6 F | DIASTOLIC BLOOD PRESSURE: 70 MMHG | HEART RATE: 76 BPM | WEIGHT: 156.4 LBS

## 2019-11-15 DIAGNOSIS — M54.50 CHRONIC MIDLINE LOW BACK PAIN WITHOUT SCIATICA: Chronic | ICD-10-CM

## 2019-11-15 DIAGNOSIS — J44.9 CHRONIC OBSTRUCTIVE PULMONARY DISEASE, UNSPECIFIED COPD TYPE (HCC): Chronic | ICD-10-CM

## 2019-11-15 DIAGNOSIS — Z79.891 ENCOUNTER FOR LONG-TERM USE OF OPIATE ANALGESIC: Chronic | ICD-10-CM

## 2019-11-15 DIAGNOSIS — M62.838 MUSCLE SPASM: Chronic | ICD-10-CM

## 2019-11-15 DIAGNOSIS — J30.89 CHRONIC NONSEASONAL ALLERGIC RHINITIS DUE TO POLLEN: ICD-10-CM

## 2019-11-15 DIAGNOSIS — J60 COALWORKER'S PNEUMOCONIOSIS (HCC): Primary | Chronic | ICD-10-CM

## 2019-11-15 DIAGNOSIS — E78.2 MIXED HYPERLIPIDEMIA: ICD-10-CM

## 2019-11-15 DIAGNOSIS — G89.29 CHRONIC MIDLINE LOW BACK PAIN WITHOUT SCIATICA: Chronic | ICD-10-CM

## 2019-11-15 DIAGNOSIS — Z79.899 ENCOUNTER FOR LONG-TERM CURRENT USE OF MEDICATION: ICD-10-CM

## 2019-11-15 DIAGNOSIS — M47.26 OSTEOARTHRITIS OF SPINE WITH RADICULOPATHY, LUMBAR REGION: Chronic | ICD-10-CM

## 2019-11-15 DIAGNOSIS — D75.1 POLYCYTHEMIA: ICD-10-CM

## 2019-11-15 DIAGNOSIS — I10 ESSENTIAL HYPERTENSION: ICD-10-CM

## 2019-11-15 PROBLEM — E87.5 HYPERKALEMIA: Status: RESOLVED | Noted: 2017-06-06 | Resolved: 2019-11-15

## 2019-11-15 PROCEDURE — 99214 OFFICE O/P EST MOD 30 MIN: CPT | Performed by: NURSE PRACTITIONER

## 2019-11-15 RX ORDER — ALBUTEROL SULFATE 90 UG/1
2 AEROSOL, METERED RESPIRATORY (INHALATION) EVERY 6 HOURS PRN
Qty: 1 INHALER | Refills: 11 | Status: SHIPPED | OUTPATIENT
Start: 2019-11-15 | End: 2022-08-16 | Stop reason: SDUPTHER

## 2019-11-15 RX ORDER — IBUPROFEN 200 MG
TABLET ORAL
COMMUNITY
Start: 2019-10-31 | End: 2020-05-11

## 2019-11-15 RX ORDER — PRAVASTATIN SODIUM 10 MG
10 TABLET ORAL NIGHTLY
Qty: 90 TABLET | Refills: 1 | Status: SHIPPED | OUTPATIENT
Start: 2019-11-15 | End: 2021-02-22 | Stop reason: DRUGHIGH

## 2019-11-15 RX ORDER — AMLODIPINE BESYLATE 10 MG/1
10 TABLET ORAL DAILY
Qty: 30 TABLET | Refills: 5 | Status: SHIPPED | OUTPATIENT
Start: 2019-11-15 | End: 2020-06-18

## 2019-11-15 RX ORDER — PRAVASTATIN SODIUM 20 MG
20 TABLET ORAL
Qty: 30 TABLET | Refills: 5 | Status: CANCELLED | OUTPATIENT
Start: 2019-11-15

## 2019-11-15 RX ORDER — LORATADINE 10 MG/1
10 TABLET ORAL DAILY
Qty: 30 TABLET | Refills: 11 | Status: SHIPPED | OUTPATIENT
Start: 2019-11-15 | End: 2020-05-11

## 2019-11-15 RX ORDER — HYDROCODONE BITARTRATE AND ACETAMINOPHEN 7.5; 325 MG/1; MG/1
1 TABLET ORAL EVERY 6 HOURS PRN
Qty: 120 TABLET | Refills: 0 | Status: SHIPPED | OUTPATIENT
Start: 2019-11-15 | End: 2019-12-09 | Stop reason: SDUPTHER

## 2019-11-15 RX ORDER — MULTIVIT-MIN/IRON/FOLIC ACID/K 18-600-40
CAPSULE ORAL
COMMUNITY
Start: 2019-10-31

## 2019-11-15 NOTE — PROGRESS NOTES
"Chief Complaint   Patient presents with   • Pain     3 mo f/u med refills     Subjective   Derrell Bynum is a 64 y.o. male who presents to the office for 3 month follow up of chronic pain requiring opioid medication. Also for review of chronic conditions.    The following portions of the patient's history were reviewed and updated as appropriate: allergies, current medications, past family history, past medical history, past social history, past surgical history and problem list.    History of Present Illness   PMH: COPD, pneumonconiousis,HTN, Hyperlipidemia, chronic allergic rhinitis, chronic lower back pain with left sciatica and muscle spasms. Intermittent secondary polycythemia, likely due to smoking.   UDS - 10-25-19- Appropriate  Fasting labs 10/25/19  TSH normal   T4 normal  T3 normal  A1C normal  CMP glucose 118 Na 136 cl 99  Lipids normal with   CBC Hbg 18.3 Hct 53.5  Likely due to smoking.  PSA 2/1/18 normal    Chronic Lower Back Pain.  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  Location is midline/ bilateral lower back, radiating down left leg at times.   Onset gradual and gradually worsening over the years.   Pain is aching. It is steady/ constant, worsens with twisting or lifing.   Pain is consistently present through day and night.   Stiffness is worse in the morning.   Pain is rated at 10/10 today and says it is a 9/10 on a 'good day'.  This waxes and wanes with activity.   At times with hydrocodone pain gets as low as a 7, which patient states \"I can deal with\", and feels is well controlled currently.     Denies numbness or weakness of legs.   He reports an intermittent sciatica, shooting tingling running down left buttock along outer left leg to foot. Feels \"like my foot is going to sleep\".  This radicular pain only occurs intermittently, once every 2-3 weeks, lasting about 10 minutes when it does occur. .   There is no loss of bowel / " bladder control.   He denies difficulty with moving bowels, reports one bowel movement daily without the use of stool softeners or laxatives.   Due for refill today    Current dose medication is hydrocodone 7.5mg/325mg 1 po QID #120.   Also managed with PRN Ibuprofen.    MRI last completed in 12/2018. Continues to decline referral to neurosurgery at this time to discuss surgical options for repair at this time.    He elects to continue waiting and watching for symptoms for now.  Discussed signs and symptoms that would require a call to provider or emergent evaluation. Verbalized understanding.  Refill of hydrocodone issued, labs are not due. Follow up in 3 months    Chronic conditions, which are all stable at present:  HTN: controllled at goal today with BP of 124/78, HR is 71, regular.  Hyperlipidemia at goal as of 10/25/19 labs, with HDL of 110 on pravachol 20mg daily. Will decrease to 10mg daily.  Chronic allergic rhinitis; managed to patient stated satisfaction with flonase and loradatine.  COPD/ [employer] Pneumoconiosis: stable. Reports use of albuterol neb PRN, not more than daily. He reports stopped Breo elipta due to his cost of $40 per month. Accepts samples of 2  14 day inhalers today, to return for additional when needed. Will discuss additional samples from [facility] where he is seen every 6 months as well.   Risk of CAD: appropriately taking a low dose aspirin daily. Denies chest pain or equivalents currently or recently.  Secondary polycythemia: elevated H/H on 10/25/19, isolated, not persistent, will repeat CBC in 1-3 months.  Cigarette smoker, not motivated to quit. 5 minutes spend discussing dangers of continued smoking and health benefits of cessation. Still not ready.    Past Medical History:   Diagnosis Date   • Acute bacterial sinusitis    • Acute pharyngitis    • Acute sinusitis    • Allergic rhinitis    • Chronic obstructive pulmonary disease with acute lower respiratory infection  "(CMS/[facility])    • Chronic sinusitis    • Coalworker's pneumoconiosis (CMS/[facility])    • Contact dermatitis    • COPD (chronic obstructive pulmonary disease) (CMS/[facility])    • Cough    • Dyspnea    • Encounter for immunization    • Erythrocytosis    • Essential hypertension    • Fever    • Hemoptysis    • Hoarse    • Hyperlipidemia    • Insect bite, nonvenomous of shoulder and upper arm, infected    • Malaise and fatigue     Other   • Need for immunization against influenza    • Osteoarthritis     Chronic daily pain   • Smokes tobacco daily    • Tobacco dependence syndrome    • Viral gastroenteritis 10/10/2013    Resolved   • Wheezing           Family History   Problem Relation Age of Onset   • Colon cancer Father         Colorectal Cancer   • Diabetes Father         Review of Systems   Constitutional: Negative.  Negative for activity change, fever and unexpected weight change.   HENT: Negative.    Eyes: Negative.    Respiratory: Negative.  Negative for cough, chest tightness and shortness of breath.    Cardiovascular: Negative.  Negative for chest pain.   Gastrointestinal: Negative.  Negative for constipation.   Endocrine: Negative.    Genitourinary: Negative.  Negative for difficulty urinating and dysuria.   Musculoskeletal: Positive for back pain.   Skin: Negative.  Negative for color change, pallor, rash and wound.   Allergic/Immunologic: Negative.    Neurological: Negative.  Negative for weakness and numbness.   Hematological: Negative.    Psychiatric/Behavioral: Negative.  Negative for sleep disturbance and suicidal ideas.   All other systems reviewed and are negative.      Objective   Vitals:    11/15/19 0823   BP: 122/70   BP Location: Left arm   Patient Position: Sitting   Cuff Size: Adult   Pulse: 76   Resp: 16   Temp: 98.6 °F (37 °C)   TempSrc: Tympanic   SpO2: 98%   Weight: 70.9 kg (156 lb 6.4 oz)   Height: 182.9 cm (72\")   PainSc:   5   PainLoc: Back     Physical Exam   Constitutional: He is oriented to person, " place, and time. He appears well-developed and well-nourished.   HENT:   Head: Normocephalic and atraumatic.   Eyes: Conjunctivae are normal. Pupils are equal, round, and reactive to light.   Neck: Normal range of motion. Neck supple.   Cardiovascular: Normal rate, regular rhythm, normal heart sounds and intact distal pulses. Exam reveals no gallop and no friction rub.   No murmur heard.  Pulmonary/Chest: Effort normal and breath sounds normal. No respiratory distress. He has no wheezes. He has no rales. He exhibits no tenderness.   Abdominal: Soft. Bowel sounds are normal. He exhibits no distension and no mass. There is no tenderness. There is no rebound and no guarding.   Musculoskeletal: He exhibits tenderness. He exhibits no edema or deformity.        Lumbar back: He exhibits decreased range of motion, tenderness, bony tenderness and pain.        Back:    Spinal and paraspinal tenderness throughout lumbar region   Lymphadenopathy:     He has no cervical adenopathy.   Neurological: He is alert and oriented to person, place, and time.   Skin: Skin is warm and dry. Capillary refill takes 2 to 3 seconds. No erythema. No pallor.   Psychiatric: He has a normal mood and affect. His behavior is normal. Judgment and thought content normal.   Nursing note and vitals reviewed.      Assessment/Plan   Derrell was seen today for pain.    Diagnoses and all orders for this visit:    Coalworker's pneumoconiosis (CMS/HCC)    Chronic midline low back pain without sciatica  Comments:  controlled to stated satisfaction today. able to garden and work as needed, and sleep without waking due to pain.   Orders:  -     HYDROcodone-acetaminophen (LORTAB) 7.5-325 MG per tablet; Take 1 tablet by mouth Every 6 (Six) Hours As Needed for Moderate Pain . Fill when due    Encounter for long-term current use of medication  -     HYDROcodone-acetaminophen (LORTAB) 7.5-325 MG per tablet; Take 1 tablet by mouth Every 6 (Six) Hours As Needed for  Moderate Pain . Fill when due    Essential hypertension  -     amLODIPine (NORVASC) 10 MG tablet; Take 1 tablet by mouth Daily.    Chronic obstructive pulmonary disease, unspecified COPD type (CMS/[facility])  -     albuterol sulfate HFA (VENTOLIN HFA) 108 (90 Base) MCG/ACT inhaler; Inhale 2 puffs Every 6 (Six) Hours As Needed for Wheezing.  -     Fluticasone Furoate-Vilanterol (BREO ELLIPTA) 100-25 MCG/INH inhaler; Inhale 1 puff Daily. Samples for 28 days issued due to cost.    Polycythemia  -     CBC & Differential  -     CBC & Differential; Future    Muscle spasm  Comments:  stable, no refills of tizanidine needed today    Osteoarthritis of spine with radiculopathy, lumbar region  -     HYDROcodone-acetaminophen (LORTAB) 7.5-325 MG per tablet; Take 1 tablet by mouth Every 6 (Six) Hours As Needed for Moderate Pain . Fill when due    Encounter for long-term use of opiate analgesic    Mixed hyperlipidemia  -     HYDROcodone-acetaminophen (LORTAB) 7.5-325 MG per tablet; Take 1 tablet by mouth Every 6 (Six) Hours As Needed for Moderate Pain . Fill when due  -     pravastatin (PRAVACHOL) 10 MG tablet; Take 1 tablet by mouth Every Night.  -     Lipid Panel; Future    Chronic nonseasonal allergic rhinitis due to pollen  -     loratadine (CLARITIN) 10 MG tablet; Take 1 tablet by mouth Daily.    Other orders  -     Cancel: pravastatin (PRAVACHOL) 20 MG tablet; Take 1 tablet by mouth every night at bedtime.           PHQ-2/PHQ-9 Depression Screening 11/15/2019   Little interest or pleasure in doing things 0   Feeling down, depressed, or hopeless 0   Trouble falling or staying asleep, or sleeping too much 0   Feeling tired or having little energy 0   Poor appetite or overeating 0   Feeling bad about yourself - or that you are a failure or have let yourself or your family down 0   Trouble concentrating on things, such as reading the newspaper or watching television 0   Moving or speaking so slowly that other people could have  noticed. Or the opposite - being so fidgety or restless that you have been moving around a lot more than usual 0   Thoughts that you would be better off dead, or of hurting yourself in some way 0   Total Score 0   Patient understands the risks associated with this controlled medication, including tolerance and addiction.  Patient also agrees to only obtain this medication from me, and not from a another provider, unless that provider is covering for me in my absence.  Patient also agrees to be compliant in dosing, and not self adjust the dose of medication.  A signed controlled substance agreement is on file, and the patient has received a controlled substance education sheet at this a previous visit.  The patient has also signed a consent for treatment with a controlled substance as per [facility] policy. TERRY was obtained.      JASMINE Noel         Return in about 3 months (around 2/15/2020).    Patient Instructions   Next week repeat CBC  Have cholesterol repeated 2-3 days before next visit. (in [place])  Call office to have additional samples of Breo elipta inhaler left at desk when needed

## 2019-11-15 NOTE — PATIENT INSTRUCTIONS
Next week repeat CBC  Have cholesterol repeated 2-3 days before next visit. (in [place])  Call office to have additional samples of Breo elipta inhaler left at desk when needed

## 2019-11-27 LAB
BASOPHILS # BLD AUTO: 0.03 10*3/MM3 (ref 0–0.2)
BASOPHILS NFR BLD AUTO: 0.3 % (ref 0–1.5)
DEPRECATED RDW RBC AUTO: 47.2 FL (ref 37–54)
EOSINOPHIL # BLD AUTO: 0.22 10*3/MM3 (ref 0–0.4)
EOSINOPHIL NFR BLD AUTO: 2.1 % (ref 0.3–6.2)
ERYTHROCYTE [DISTWIDTH] IN BLOOD BY AUTOMATED COUNT: 13.5 % (ref 12.3–15.4)
HCT VFR BLD AUTO: 51.7 % (ref 37.5–51)
HGB BLD-MCNC: 17.9 G/DL (ref 13–17.7)
LYMPHOCYTES # BLD AUTO: 1.58 10*3/MM3 (ref 0.7–3.1)
LYMPHOCYTES NFR BLD AUTO: 14.9 % (ref 19.6–45.3)
MCH RBC QN AUTO: 33.3 PG (ref 26.6–33)
MCHC RBC AUTO-ENTMCNC: 34.6 G/DL (ref 31.5–35.7)
MCV RBC AUTO: 96.1 FL (ref 79–97)
MONOCYTES # BLD AUTO: 0.94 10*3/MM3 (ref 0.1–0.9)
MONOCYTES NFR BLD AUTO: 8.9 % (ref 5–12)
NEUTROPHILS # BLD AUTO: 7.82 10*3/MM3 (ref 1.7–7)
NEUTROPHILS NFR BLD AUTO: 73.8 % (ref 42.7–76)
PLATELET # BLD AUTO: 315 10*3/MM3 (ref 140–450)
PMV BLD AUTO: 9.4 FL (ref 6–12)
RBC # BLD AUTO: 5.38 10*6/MM3 (ref 4.14–5.8)
WBC NRBC COR # BLD: 10.59 10*3/MM3 (ref 3.4–10.8)

## 2019-11-27 PROCEDURE — 85025 COMPLETE CBC W/AUTO DIFF WBC: CPT | Performed by: NURSE PRACTITIONER

## 2019-11-27 PROCEDURE — 36415 COLL VENOUS BLD VENIPUNCTURE: CPT | Performed by: NURSE PRACTITIONER

## 2019-12-09 DIAGNOSIS — G89.29 CHRONIC MIDLINE LOW BACK PAIN WITHOUT SCIATICA: Chronic | ICD-10-CM

## 2019-12-09 DIAGNOSIS — M54.50 CHRONIC MIDLINE LOW BACK PAIN WITHOUT SCIATICA: Chronic | ICD-10-CM

## 2019-12-09 DIAGNOSIS — E78.2 MIXED HYPERLIPIDEMIA: ICD-10-CM

## 2019-12-09 DIAGNOSIS — Z79.899 ENCOUNTER FOR LONG-TERM CURRENT USE OF MEDICATION: ICD-10-CM

## 2019-12-09 DIAGNOSIS — M47.26 OSTEOARTHRITIS OF SPINE WITH RADICULOPATHY, LUMBAR REGION: Chronic | ICD-10-CM

## 2019-12-09 RX ORDER — HYDROCODONE BITARTRATE AND ACETAMINOPHEN 7.5; 325 MG/1; MG/1
1 TABLET ORAL EVERY 6 HOURS PRN
Qty: 120 TABLET | Refills: 0 | Status: SHIPPED | OUTPATIENT
Start: 2019-12-09 | End: 2020-01-10 | Stop reason: SDUPTHER

## 2020-01-10 DIAGNOSIS — G89.29 CHRONIC MIDLINE LOW BACK PAIN WITHOUT SCIATICA: Chronic | ICD-10-CM

## 2020-01-10 DIAGNOSIS — E78.2 MIXED HYPERLIPIDEMIA: ICD-10-CM

## 2020-01-10 DIAGNOSIS — Z79.899 ENCOUNTER FOR LONG-TERM CURRENT USE OF MEDICATION: ICD-10-CM

## 2020-01-10 DIAGNOSIS — M54.50 CHRONIC MIDLINE LOW BACK PAIN WITHOUT SCIATICA: Chronic | ICD-10-CM

## 2020-01-10 DIAGNOSIS — M47.26 OSTEOARTHRITIS OF SPINE WITH RADICULOPATHY, LUMBAR REGION: Chronic | ICD-10-CM

## 2020-01-10 RX ORDER — HYDROCODONE BITARTRATE AND ACETAMINOPHEN 7.5; 325 MG/1; MG/1
1 TABLET ORAL EVERY 6 HOURS PRN
Qty: 120 TABLET | Refills: 0 | Status: SHIPPED | OUTPATIENT
Start: 2020-01-10 | End: 2020-02-14 | Stop reason: SDUPTHER

## 2020-02-14 ENCOUNTER — OFFICE VISIT (OUTPATIENT)
Dept: FAMILY MEDICINE CLINIC | Facility: CLINIC | Age: 65
End: 2020-02-14

## 2020-02-14 VITALS
BODY MASS INDEX: 21.71 KG/M2 | OXYGEN SATURATION: 98 % | SYSTOLIC BLOOD PRESSURE: 136 MMHG | HEIGHT: 72 IN | HEART RATE: 79 BPM | TEMPERATURE: 97.9 F | RESPIRATION RATE: 16 BRPM | WEIGHT: 160.3 LBS | DIASTOLIC BLOOD PRESSURE: 80 MMHG

## 2020-02-14 DIAGNOSIS — E78.2 MIXED HYPERLIPIDEMIA: ICD-10-CM

## 2020-02-14 DIAGNOSIS — D75.1 POLYCYTHEMIA: ICD-10-CM

## 2020-02-14 DIAGNOSIS — J44.9 CHRONIC OBSTRUCTIVE PULMONARY DISEASE, UNSPECIFIED COPD TYPE (HCC): Chronic | ICD-10-CM

## 2020-02-14 DIAGNOSIS — M47.26 OSTEOARTHRITIS OF SPINE WITH RADICULOPATHY, LUMBAR REGION: Chronic | ICD-10-CM

## 2020-02-14 DIAGNOSIS — G89.29 CHRONIC MIDLINE LOW BACK PAIN WITHOUT SCIATICA: Chronic | ICD-10-CM

## 2020-02-14 DIAGNOSIS — Z79.899 ENCOUNTER FOR LONG-TERM CURRENT USE OF MEDICATION: ICD-10-CM

## 2020-02-14 DIAGNOSIS — Z12.5 SCREENING FOR MALIGNANT NEOPLASM OF PROSTATE: ICD-10-CM

## 2020-02-14 DIAGNOSIS — M54.50 CHRONIC MIDLINE LOW BACK PAIN WITHOUT SCIATICA: Chronic | ICD-10-CM

## 2020-02-14 DIAGNOSIS — N13.8 BPH WITH OBSTRUCTION/LOWER URINARY TRACT SYMPTOMS: ICD-10-CM

## 2020-02-14 DIAGNOSIS — Z79.891 LONG TERM PRESCRIPTION OPIATE USE: Primary | ICD-10-CM

## 2020-02-14 DIAGNOSIS — N40.1 BPH WITH OBSTRUCTION/LOWER URINARY TRACT SYMPTOMS: ICD-10-CM

## 2020-02-14 PROCEDURE — 99214 OFFICE O/P EST MOD 30 MIN: CPT | Performed by: NURSE PRACTITIONER

## 2020-02-14 RX ORDER — HYDROCODONE BITARTRATE AND ACETAMINOPHEN 7.5; 325 MG/1; MG/1
1 TABLET ORAL EVERY 6 HOURS PRN
Qty: 120 TABLET | Refills: 0 | Status: SHIPPED | OUTPATIENT
Start: 2020-02-14 | End: 2020-03-10 | Stop reason: SDUPTHER

## 2020-02-14 RX ORDER — TAMSULOSIN HYDROCHLORIDE 0.4 MG/1
1 CAPSULE ORAL DAILY
Qty: 30 CAPSULE | Refills: 11 | Status: SHIPPED | OUTPATIENT
Start: 2020-02-14 | End: 2021-02-26

## 2020-02-14 NOTE — PATIENT INSTRUCTIONS
Return for 2 more samples of Breo Elipta 100-25 inhalers every 4 weeks when needed for COPD management.

## 2020-02-14 NOTE — PROGRESS NOTES
"Chief Complaint   Patient presents with   • Follow-up     3 month   • Med Refill     Subjective   Derrell Bynum is a 65 y.o. male who presents to the office for routine follow up of chronic conditions pain requiring hydrocodone and polycythemia.    The following portions of the patient's history were reviewed and updated as appropriate: allergies, current medications, past family history, past medical history, past social history, past surgical history and problem list.    History of Present Illness   PMH: COPD, pneumonconiousis,HTN, Hyperlipidemia, chronic allergic rhinitis, chronic lower back pain with left sciatica and muscle spasms. Intermittent secondary polycythemia, likely due to smoking.     UDS - 10-25-19- Appropriate due again now  Fasting labs 10/25/19  TSH normal   T4 normal  T3 normal  A1C normal  CMP glucose 118 Na 136 cl 99  Lipids normal with   CBC Hbg 18.3 Hct 53.5  Likely due to smoking.  PSA 2/1/18 normal    Reports new problem of nocturia 3-4 times at night, small volumes. Feels isnt emptying well. No particular hesitancy or urgency and no dysuria or hematuria.   Chronic Lower Back Pain.  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  Location is midline/ bilateral lower back, radiating down left leg at times.   Onset gradual and gradually worsening over the years.   Pain is aching. It is steady/ constant, worsens with twisting or lifing.   Pain is consistently present through day and night.   Stiffness is worse in the morning.   Pain is rated at 7/10 today and says it is sometimes a 4/10 on a 'good day'. This is much improved with QID dosing of 7.5mg hydrocodone over his previous TID dosing.  This waxes and wanes with activity.   At times with hydrocodone pain gets as low as a 4, which patient states \"I can deal with\", and feels is well controlled currently.     Denies numbness or weakness of legs.   He reports an intermittent sciatica, " "shooting tingling running down left buttock along outer left leg to foot. Feels \"like my foot is going to sleep\".  This radicular pain only occurs intermittently, once every 2-3 weeks, lasting about 10 minutes when it does occur. .   There is no loss of bowel / bladder control.   He denies difficulty with moving bowels, reports one bowel movement daily without the use of stool softeners or laxatives.   Due for refill today    Current dose medication is hydrocodone 7.5mg/325mg 1 po QID #120.   Also managed with PRN Ibuprofen.     MRI last completed in 12/2018. Continues to decline referral to neurosurgery at this time to discuss surgical options for repair at this time.    He elects to continue waiting and watching for symptoms for now.  Discussed signs and symptoms that would require a call to provider or emergent evaluation. Verbalized understanding.  Refill of hydrocodone issued, labs are not due. Follow up in 3 months     Chronic conditions, which are all stable at present:  HTN: controllled at slightly above goal today with BP of 136/80 HR is 79, regular.  Hyperlipidemia at goal as of 10/25/19 labs, with HDL of 110 on pravachol 20mg daily. Decreased to 10mg daily. Will repeat labs in May  Chronic allergic rhinitis; managed to patient stated satisfaction with flonase and loradatine.  COPD/ [employer] Pneumoconiosis: stable. Reports use of albuterol neb PRN, not more than daily. He reports stopped Breo elipta due to his cost of $40 per month. Accepts samples of 2  14 day inhalers again today, to return for additional when needed. Discussed actually coming for the additional samples when used up, did not do so after last visit. Will discuss additional samples from [facility] where he is seen every 6 months as well.   Risk of CAD: appropriately taking a low dose aspirin daily. Denies chest pain or equivalents currently or recently.  Secondary polycythemia: elevated H/H on 10/25/19, isolated, not persistent, " will repeat CBC in 1-3 months.  Cigarette smoker, not motivated to quit. 5 minutes spend discussing dangers of continued smoking and health benefits of cessation. Still not ready.    Parts of HPI, ROS  and PE from most recent visit may be carried forward and if so, are updated as appropriate for current situation  .  Past Medical History:   Diagnosis Date   • Acute bacterial sinusitis    • Acute pharyngitis    • Acute sinusitis    • Allergic rhinitis    • Chronic obstructive pulmonary disease with acute lower respiratory infection (CMS/HCC)    • Chronic sinusitis    • Coalworker's pneumoconiosis (CMS/HCC)    • Contact dermatitis    • COPD (chronic obstructive pulmonary disease) (CMS/HCC)    • Cough    • Dyspnea    • Encounter for immunization    • Erythrocytosis    • Essential hypertension    • Fever    • Hemoptysis    • Hoarse    • Hyperlipidemia    • Insect bite, nonvenomous of shoulder and upper arm, infected    • Malaise and fatigue     Other   • Need for immunization against influenza    • Osteoarthritis     Chronic daily pain   • Smokes tobacco daily    • Tobacco dependence syndrome    • Viral gastroenteritis 10/10/2013    Resolved   • Wheezing           Family History   Problem Relation Age of Onset   • Colon cancer Father         Colorectal Cancer   • Diabetes Father         Review of Systems   Constitutional: Negative.  Negative for activity change, fever and unexpected weight change.   HENT: Negative.    Eyes: Negative.    Respiratory: Negative.  Negative for cough, chest tightness and shortness of breath.    Cardiovascular: Negative.  Negative for chest pain.   Gastrointestinal: Negative.  Negative for constipation.   Endocrine: Negative.    Genitourinary: Negative.  Negative for difficulty urinating and dysuria.   Musculoskeletal: Positive for back pain.   Skin: Negative.  Negative for color change, pallor, rash and wound.   Allergic/Immunologic: Negative.    Neurological: Negative.  Negative for  "weakness and numbness.   Hematological: Negative.    Psychiatric/Behavioral: Negative.  Negative for sleep disturbance and suicidal ideas.   All other systems reviewed and are negative.      Objective   Vitals:    02/14/20 0842   BP: 136/80   BP Location: Left arm   Patient Position: Sitting   Cuff Size: Adult   Pulse: 79   Resp: 16   Temp: 97.9 °F (36.6 °C)   TempSrc: Tympanic   SpO2: 98%   Weight: 72.7 kg (160 lb 4.8 oz)   Height: 182.9 cm (72\")   PainSc:   7   PainLoc: Back     Physical Exam   Constitutional: He is oriented to person, place, and time. He appears well-developed and well-nourished.   HENT:   Head: Normocephalic and atraumatic.   Eyes: Pupils are equal, round, and reactive to light. Conjunctivae are normal.   Neck: Normal range of motion. Neck supple.   Cardiovascular: Normal rate, regular rhythm, normal heart sounds and intact distal pulses. Exam reveals no gallop and no friction rub.   No murmur heard.  Pulmonary/Chest: Effort normal and breath sounds normal. No respiratory distress. He has no wheezes. He has no rales. He exhibits no tenderness.   Abdominal: Soft. Bowel sounds are normal. He exhibits no distension and no mass. There is no tenderness. There is no rebound and no guarding.   Musculoskeletal: He exhibits tenderness. He exhibits no edema or deformity.        Lumbar back: He exhibits decreased range of motion, tenderness, bony tenderness and pain.        Back:    Spinal and paraspinal tenderness throughout lumbar region   Lymphadenopathy:     He has no cervical adenopathy.   Neurological: He is alert and oriented to person, place, and time.   Skin: Skin is warm and dry. Capillary refill takes 2 to 3 seconds. No erythema. No pallor.   Psychiatric: He has a normal mood and affect. His behavior is normal. Judgment and thought content normal.   Nursing note and vitals reviewed.      Assessment/Plan   Derrell was seen today for follow-up and med refill.    Diagnoses and all orders for this " visit:    Long term prescription opiate use  -     ToxASSURE Select 13 Discrete -    Chronic midline low back pain without sciatica  Comments:  controlled to stated satisfaction today. able to garden and work as needed, and sleep without waking due to pain.   Orders:  -     HYDROcodone-acetaminophen (LORTAB) 7.5-325 MG per tablet; Take 1 tablet by mouth Every 6 (Six) Hours As Needed for Moderate Pain . Fill when due    Encounter for long-term current use of medication  -     HYDROcodone-acetaminophen (LORTAB) 7.5-325 MG per tablet; Take 1 tablet by mouth Every 6 (Six) Hours As Needed for Moderate Pain . Fill when due    Osteoarthritis of spine with radiculopathy, lumbar region  -     HYDROcodone-acetaminophen (LORTAB) 7.5-325 MG per tablet; Take 1 tablet by mouth Every 6 (Six) Hours As Needed for Moderate Pain . Fill when due    Mixed hyperlipidemia  -     HYDROcodone-acetaminophen (LORTAB) 7.5-325 MG per tablet; Take 1 tablet by mouth Every 6 (Six) Hours As Needed for Moderate Pain . Fill when due    Polycythemia  -     CBC & Differential    Screening for malignant neoplasm of prostate  -     PSA SCREENING    BPH with obstruction/lower urinary tract symptoms  -     tamsulosin (FLOMAX) 0.4 MG capsule 24 hr capsule; Take 1 capsule by mouth Daily.    Chronic obstructive pulmonary disease, unspecified COPD type (CMS/HCC)  -     Fluticasone Furoate-Vilanterol (BREO ELLIPTA) 100-25 MCG/INH inhaler; Inhale 1 puff Daily.           PHQ-2/PHQ-9 Depression Screening 2/14/2020   Little interest or pleasure in doing things 0   Feeling down, depressed, or hopeless 0   Trouble falling or staying asleep, or sleeping too much 0   Feeling tired or having little energy 0   Poor appetite or overeating 0   Feeling bad about yourself - or that you are a failure or have let yourself or your family down 0   Trouble concentrating on things, such as reading the newspaper or watching television 0   Moving or speaking so slowly that other  people could have noticed. Or the opposite - being so fidgety or restless that you have been moving around a lot more than usual 0   Thoughts that you would be better off dead, or of hurting yourself in some way 0   Total Score 0   Patient understands the risks associated with this controlled medication, including tolerance and addiction.  Patient also agrees to only obtain this medication from me, and not from a another provider, unless that provider is covering for me in my absence.  Patient also agrees to be compliant in dosing, and not self adjust the dose of medication.  A signed controlled substance agreement is on file, and the patient has received a controlled substance education sheet at this a previous visit.  The patient has also signed a consent for treatment with a controlled substance as per [facility] policy. TERRY was obtained.      JASMINE Noel         Return in about 3 months (around 5/14/2020), or if symptoms worsen or fail to improve.    Patient Instructions   Return for 2 more samples of Breo Elipta 100-25 inhalers every 4 weeks when needed for COPD management.

## 2020-02-20 ENCOUNTER — LAB (OUTPATIENT)
Dept: LAB | Facility: OTHER | Age: 65
End: 2020-02-20

## 2020-02-20 DIAGNOSIS — E78.2 MIXED HYPERLIPIDEMIA: ICD-10-CM

## 2020-02-20 LAB
BASOPHILS # BLD AUTO: 0.06 10*3/MM3 (ref 0–0.2)
BASOPHILS NFR BLD AUTO: 0.6 % (ref 0–1.5)
CHOLEST SERPL-MCNC: 174 MG/DL (ref 150–200)
DEPRECATED RDW RBC AUTO: 46.9 FL (ref 37–54)
EOSINOPHIL # BLD AUTO: 0.21 10*3/MM3 (ref 0–0.4)
EOSINOPHIL NFR BLD AUTO: 2 % (ref 0.3–6.2)
ERYTHROCYTE [DISTWIDTH] IN BLOOD BY AUTOMATED COUNT: 13.5 % (ref 12.3–15.4)
HCT VFR BLD AUTO: 50.4 % (ref 37.5–51)
HDLC SERPL-MCNC: 99 MG/DL (ref 40–59)
HGB BLD-MCNC: 16.9 G/DL (ref 13–17.7)
LDLC SERPL CALC-MCNC: 65 MG/DL
LDLC/HDLC SERPL: 0.65 {RATIO} (ref 0–3.55)
LYMPHOCYTES # BLD AUTO: 1.3 10*3/MM3 (ref 0.7–3.1)
LYMPHOCYTES NFR BLD AUTO: 12.5 % (ref 19.6–45.3)
MCH RBC QN AUTO: 31.9 PG (ref 26.6–33)
MCHC RBC AUTO-ENTMCNC: 33.5 G/DL (ref 31.5–35.7)
MCV RBC AUTO: 95.3 FL (ref 79–97)
MONOCYTES # BLD AUTO: 1.29 10*3/MM3 (ref 0.1–0.9)
MONOCYTES NFR BLD AUTO: 12.4 % (ref 5–12)
NEUTROPHILS # BLD AUTO: 7.55 10*3/MM3 (ref 1.7–7)
NEUTROPHILS NFR BLD AUTO: 72.5 % (ref 42.7–76)
PLATELET # BLD AUTO: 340 10*3/MM3 (ref 140–450)
PMV BLD AUTO: 9 FL (ref 6–12)
PSA SERPL-MCNC: 1.85 NG/ML (ref 0–4)
RBC # BLD AUTO: 5.29 10*6/MM3 (ref 4.14–5.8)
TRIGL SERPL-MCNC: 52 MG/DL
VLDLC SERPL-MCNC: 10.4 MG/DL
WBC NRBC COR # BLD: 10.41 10*3/MM3 (ref 3.4–10.8)

## 2020-02-20 PROCEDURE — 80307 DRUG TEST PRSMV CHEM ANLYZR: CPT | Performed by: NURSE PRACTITIONER

## 2020-02-20 PROCEDURE — 85025 COMPLETE CBC W/AUTO DIFF WBC: CPT | Performed by: NURSE PRACTITIONER

## 2020-02-20 PROCEDURE — G0481 DRUG TEST DEF 8-14 CLASSES: HCPCS | Performed by: NURSE PRACTITIONER

## 2020-02-20 PROCEDURE — 36415 COLL VENOUS BLD VENIPUNCTURE: CPT | Performed by: NURSE PRACTITIONER

## 2020-02-20 PROCEDURE — 80061 LIPID PANEL: CPT | Performed by: NURSE PRACTITIONER

## 2020-02-20 PROCEDURE — G0103 PSA SCREENING: HCPCS | Performed by: NURSE PRACTITIONER

## 2020-02-21 ENCOUNTER — TELEPHONE (OUTPATIENT)
Dept: FAMILY MEDICINE CLINIC | Facility: CLINIC | Age: 65
End: 2020-02-21

## 2020-02-24 LAB
6-ACETYLMORPHINE: NEGATIVE
6MAM SERPLBLD-MCNC: NOT DETECTED NG/MG CREAT
7-AMINOCLONAZEPAM UR: NOT DETECTED NG/MG CREAT
A-OH ALPRAZ/CREAT UR: NOT DETECTED NG/MG CREAT
ALFENTANIL UR QL: NOT DETECTED NG/MG CREAT
ALPHA-HYDROXYMIDAZOLAM, URINE: NOT DETECTED NG/MG CREAT
ALPHA-HYDROXYTRIAZOLAM, URINE: NOT DETECTED NG/MG CREAT
AMOBARBITAL UR: NOT DETECTED
AMPHET UR QL CFM: NOT DETECTED NG/MG CREAT
AMPHETAMINES UR QL SCN: NEGATIVE
BARBITAL UR QL CFM: NOT DETECTED
BARBITURATES UR QL SCN: NEGATIVE
BENZODIAZ UR QL SCN: NEGATIVE
BENZOYLECGONINE UR: NOT DETECTED NG/MG CREAT
BUPRENORPHINE UR QL: NEGATIVE
BUPRENORPHINE UR QL: NOT DETECTED NG/MG CREAT
BUTABARBITAL UR QL: NOT DETECTED
BUTALBITAL UR QL: NOT DETECTED
CANNABINOIDS UR QL CFM: NEGATIVE
CLONAZEPAM UR QL: NOT DETECTED NG/MG CREAT
COCAETHYLENE UR QL CFM: NOT DETECTED NG/MG CREAT
COCAINE UR QL CFM: NEGATIVE
CODEINE UR QL: NOT DETECTED NG/MG CREAT
CONV COCAINE, UR: NOT DETECTED NG/MG CREAT
CONV REPORT SUMMARY: NORMAL
CREAT 24H UR-MCNC: 45 MG/DL
DESALKYLFLURAZ/CREAT UR: NOT DETECTED NG/MG CREAT
DESMETHYLFLUNITRAZEPAM: NOT DETECTED NG/MG CREAT
DIAZEPAM UR-MCNC: NOT DETECTED NG/MG CREAT
DIHYDROCODEINE UR: 162 NG/MG CREAT
EDDP SERPL QL: NOT DETECTED NG/MG CREAT
ETHANOL SCREEN URINE (REF): NEGATIVE
ETHANOL UR-MCNC: NOT DETECTED G/DL
FENTANYL UR QL: NEGATIVE
FENTANYL+NORFENTANYL UR QL SCN: NOT DETECTED NG/MG CREAT
FLUNITRAZEPAM SERPLBLD-MCNC: NOT DETECTED NG/MG CREAT
HYDROCODONE UR QL: 1244 NG/MG CREAT
HYDROMORPHONE UR QL: 611 NG/MG CREAT
LEVEL OF DETECTION:: NORMAL
LORAZEPAM UR-MCNC: NOT DETECTED NG/MG CREAT
LORAZEPAM/CREAT UR: NOT DETECTED NG/MG CREAT
MDA SERPLBLD-MCNC: NOT DETECTED NG/MG CREAT
MDMA UR QL SCN: NOT DETECTED NG/MG CREAT
MEPHOBARBITAL UR QL CFM: NOT DETECTED
METHADONE BLD QL SCN: NEGATIVE
METHADONE, URINE: NOT DETECTED NG/MG CREAT
METHAMPHETAMINE UR: NOT DETECTED NG/MG CREAT
MIDAZOLAM UR-MCNC: NOT DETECTED NG/MG CREAT
MORPHINE UR QL: NOT DETECTED NG/MG CREAT
N-DESMETHYLTRAMADOL, U: NOT DETECTED NG/MG CREAT
NARCOTICS UR: NEGATIVE
NORBUPRENORPHINE SERPLBLD-MCNC: NOT DETECTED NG/MG CREAT
NORCODEINE UR-MCNC: NOT DETECTED NG/MG CREAT
NORDIAZEPAM SERPL CFM-MCNC: NOT DETECTED NG/MG CREAT
NORFENTANYL UR: NOT DETECTED NG/MG CREAT
NORMORPHINE: NOT DETECTED NG/MG CREAT
NOROXYMORPHONE: NOT DETECTED NG/MG CREAT
O-DESMETHYLTRAMADOL, UR: NOT DETECTED NG/MG CREAT
OPIATES UR QL CFM: NORMAL
OPIATES, URINE, NORHYDROCODONE: 609 NG/MG CREAT
OPIATES, URINE, NOROXYCODONE: NOT DETECTED NG/MG CREAT
OXAZEPAM UR QL: NOT DETECTED NG/MG CREAT
OXYCODONE UR QL: NEGATIVE
OXYCODONE UR: NOT DETECTED NG/MG CREAT
OXYMORPHONE UR: NOT DETECTED NG/MG CREAT
PENTOBARBITAL UR: NOT DETECTED
PHENOBARB UR QL: NOT DETECTED
SECOBARBITAL UR QL: NOT DETECTED
SUFENTANIL UR QL: NOT DETECTED NG/MG CREAT
TAPENTADOL SERPLBLD-MCNC: NEGATIVE NG/ML
TAPENTADOL UR-MCNC: NOT DETECTED NG/MG CREAT
TEMAZEPAM UR QL CFM: NOT DETECTED NG/MG CREAT
THC UR CFM-MCNC: NOT DETECTED NG/MG CREAT
THIOPENTAL UR QL CFM: NOT DETECTED
TRAMADOL UR: NOT DETECTED NG/MG CREAT

## 2020-03-10 DIAGNOSIS — M47.26 OSTEOARTHRITIS OF SPINE WITH RADICULOPATHY, LUMBAR REGION: Chronic | ICD-10-CM

## 2020-03-10 DIAGNOSIS — Z79.899 ENCOUNTER FOR LONG-TERM CURRENT USE OF MEDICATION: ICD-10-CM

## 2020-03-10 DIAGNOSIS — E78.2 MIXED HYPERLIPIDEMIA: ICD-10-CM

## 2020-03-10 DIAGNOSIS — M54.50 CHRONIC MIDLINE LOW BACK PAIN WITHOUT SCIATICA: Chronic | ICD-10-CM

## 2020-03-10 DIAGNOSIS — G89.29 CHRONIC MIDLINE LOW BACK PAIN WITHOUT SCIATICA: Chronic | ICD-10-CM

## 2020-03-11 DIAGNOSIS — M47.26 OSTEOARTHRITIS OF SPINE WITH RADICULOPATHY, LUMBAR REGION: Chronic | ICD-10-CM

## 2020-03-11 DIAGNOSIS — M54.50 CHRONIC MIDLINE LOW BACK PAIN WITHOUT SCIATICA: Chronic | ICD-10-CM

## 2020-03-11 DIAGNOSIS — Z79.899 ENCOUNTER FOR LONG-TERM CURRENT USE OF MEDICATION: ICD-10-CM

## 2020-03-11 DIAGNOSIS — G89.29 CHRONIC MIDLINE LOW BACK PAIN WITHOUT SCIATICA: Chronic | ICD-10-CM

## 2020-03-11 DIAGNOSIS — E78.2 MIXED HYPERLIPIDEMIA: ICD-10-CM

## 2020-03-13 RX ORDER — HYDROCODONE BITARTRATE AND ACETAMINOPHEN 7.5; 325 MG/1; MG/1
1 TABLET ORAL EVERY 6 HOURS PRN
Qty: 120 TABLET | Refills: 0 | Status: SHIPPED | OUTPATIENT
Start: 2020-03-13 | End: 2020-04-09 | Stop reason: SDUPTHER

## 2020-03-13 RX ORDER — HYDROCODONE BITARTRATE AND ACETAMINOPHEN 7.5; 325 MG/1; MG/1
1 TABLET ORAL EVERY 6 HOURS PRN
Qty: 120 TABLET | Refills: 0 | OUTPATIENT
Start: 2020-03-13

## 2020-04-09 DIAGNOSIS — Z79.899 ENCOUNTER FOR LONG-TERM CURRENT USE OF MEDICATION: ICD-10-CM

## 2020-04-09 DIAGNOSIS — M54.50 CHRONIC MIDLINE LOW BACK PAIN WITHOUT SCIATICA: Chronic | ICD-10-CM

## 2020-04-09 DIAGNOSIS — G89.29 CHRONIC MIDLINE LOW BACK PAIN WITHOUT SCIATICA: Chronic | ICD-10-CM

## 2020-04-09 DIAGNOSIS — M47.26 OSTEOARTHRITIS OF SPINE WITH RADICULOPATHY, LUMBAR REGION: Chronic | ICD-10-CM

## 2020-04-09 DIAGNOSIS — E78.2 MIXED HYPERLIPIDEMIA: ICD-10-CM

## 2020-04-10 RX ORDER — HYDROCODONE BITARTRATE AND ACETAMINOPHEN 7.5; 325 MG/1; MG/1
1 TABLET ORAL EVERY 6 HOURS PRN
Qty: 120 TABLET | Refills: 0 | Status: SHIPPED | OUTPATIENT
Start: 2020-04-10 | End: 2020-05-08 | Stop reason: SDUPTHER

## 2020-05-08 ENCOUNTER — OFFICE VISIT (OUTPATIENT)
Dept: FAMILY MEDICINE CLINIC | Facility: CLINIC | Age: 65
End: 2020-05-08

## 2020-05-08 DIAGNOSIS — M54.50 CHRONIC MIDLINE LOW BACK PAIN WITHOUT SCIATICA: Chronic | ICD-10-CM

## 2020-05-08 DIAGNOSIS — M47.26 OSTEOARTHRITIS OF SPINE WITH RADICULOPATHY, LUMBAR REGION: Chronic | ICD-10-CM

## 2020-05-08 DIAGNOSIS — G89.29 CHRONIC MIDLINE LOW BACK PAIN WITHOUT SCIATICA: Chronic | ICD-10-CM

## 2020-05-08 DIAGNOSIS — M62.838 MUSCLE SPASM: ICD-10-CM

## 2020-05-08 DIAGNOSIS — E78.2 MIXED HYPERLIPIDEMIA: ICD-10-CM

## 2020-05-08 DIAGNOSIS — J30.89 CHRONIC NONSEASONAL ALLERGIC RHINITIS DUE TO POLLEN: ICD-10-CM

## 2020-05-08 DIAGNOSIS — Z79.899 ENCOUNTER FOR LONG-TERM CURRENT USE OF MEDICATION: ICD-10-CM

## 2020-05-08 PROCEDURE — G2025 DIS SITE TELE SVCS RHC/FQHC: HCPCS | Performed by: NURSE PRACTITIONER

## 2020-05-11 RX ORDER — MONTELUKAST SODIUM 10 MG/1
10 TABLET ORAL NIGHTLY
Qty: 30 TABLET | Refills: 5 | Status: SHIPPED | OUTPATIENT
Start: 2020-05-11 | End: 2020-11-16

## 2020-05-11 RX ORDER — TIZANIDINE 2 MG/1
2 TABLET ORAL EVERY 8 HOURS PRN
Qty: 30 TABLET | Refills: 5 | Status: SHIPPED | OUTPATIENT
Start: 2020-05-11 | End: 2020-12-02

## 2020-05-11 RX ORDER — HYDROCODONE BITARTRATE AND ACETAMINOPHEN 7.5; 325 MG/1; MG/1
1 TABLET ORAL EVERY 6 HOURS PRN
Qty: 120 TABLET | Refills: 0 | Status: SHIPPED | OUTPATIENT
Start: 2020-05-11 | End: 2020-06-09 | Stop reason: SDUPTHER

## 2020-05-14 NOTE — PROGRESS NOTES
"Chief Complaint   Patient presents with   • Back Pain     Subjective   Derrell Bynum is a 65 y.o. male who presents to the office by telephone visit due to pandemic for routine follow up of chronic back pain requiring hydrocodone.    The following portions of the patient's history were reviewed and updated as appropriate: allergies, current medications, past family history, past medical history, past social history, past surgical history and problem list.    History of Present Illness   You have chosen to receive care through a telephone visit. Do you consent to use a telephone visit for your medical care today? Yes  22 minutes medical discussion.    Chronic Lower Back Pain.  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  Location is midline/ bilateral lower back, radiating down left leg at times.   Onset gradual and gradually worsening over the years.   Pain is aching. It is steady/ constant, worsens with twisting or lifing.   Pain is consistently present through day and night.   Stiffness is worse in the morning.   Pain is rated at 6/10 today and says it is sometimes a 4/10 on a 'good day'. feels is well controlled.  This waxes and wanes with activity.   At times with hydrocodone pain gets as low as a 4, which patient states \"I can deal with\", and feels is well controlled currently    No new complaints today.  Parts of most recent relevant visit HPI, ROS  and PE may be carried forward and all are updated as appropriate for current situation.  Past Medical History:   Diagnosis Date   • Acute bacterial sinusitis    • Acute pharyngitis    • Acute sinusitis    • Allergic rhinitis    • Chronic obstructive pulmonary disease with acute lower respiratory infection (CMS/HCC)    • Chronic sinusitis    • Coalworker's pneumoconiosis (CMS/HCC)    • Contact dermatitis    • COPD (chronic obstructive pulmonary disease) (CMS/HCC)    • Cough    • Dyspnea    • Encounter for " immunization    • Erythrocytosis    • Essential hypertension    • Fever    • Hemoptysis    • Hoarse    • Hyperlipidemia    • Insect bite, nonvenomous of shoulder and upper arm, infected    • Malaise and fatigue     Other   • Need for immunization against influenza    • Osteoarthritis     Chronic daily pain   • Smokes tobacco daily    • Tobacco dependence syndrome    • Viral gastroenteritis 10/10/2013    Resolved   • Wheezing           Family History   Problem Relation Age of Onset   • Colon cancer Father         Colorectal Cancer   • Diabetes Father         Review of Systems   Constitutional: Negative.  Negative for activity change, fever and unexpected weight change.   HENT: Negative.    Eyes: Negative.    Respiratory: Negative.  Negative for cough, chest tightness and shortness of breath.    Cardiovascular: Negative.  Negative for chest pain.   Gastrointestinal: Negative.  Negative for constipation.   Endocrine: Negative.    Genitourinary: Negative.  Negative for difficulty urinating and dysuria.   Musculoskeletal: Positive for back pain.   Skin: Negative.  Negative for color change, pallor, rash and wound.   Allergic/Immunologic: Negative.    Neurological: Negative.  Negative for weakness and numbness.   Hematological: Negative.    Psychiatric/Behavioral: Negative.  Negative for sleep disturbance and suicidal ideas.   All other systems reviewed and are negative.      Objective   Vitals:    05/11/20 1620   PainSc:   6   PainLoc: Back     Physical Exam   Constitutional: He is oriented to person, place, and time. No distress.   Pulmonary/Chest: Effort normal. No stridor. No respiratory distress.   Neurological: He is alert and oriented to person, place, and time.   Psychiatric: He has a normal mood and affect. His behavior is normal. Judgment and thought content normal.       Assessment/Plan   Derrell was seen today for back pain.    Diagnoses and all orders for this visit:    Muscle spasm  -     tiZANidine (ZANAFLEX)  2 MG tablet; Take 1 tablet by mouth Every 8 (Eight) Hours As Needed for Muscle Spasms.    Chronic nonseasonal allergic rhinitis due to pollen  -     montelukast (SINGULAIR) 10 MG tablet; Take 1 tablet by mouth Every Night.    Chronic midline low back pain without sciatica  Comments:  controlled to stated satisfaction today. able to garden and work as needed, and sleep without waking due to pain.   Orders:  -     HYDROcodone-acetaminophen (Lortab) 7.5-325 MG per tablet; Take 1 tablet by mouth Every 6 (Six) Hours As Needed for Moderate Pain . Fill when due    Encounter for long-term current use of medication  -     HYDROcodone-acetaminophen (Lortab) 7.5-325 MG per tablet; Take 1 tablet by mouth Every 6 (Six) Hours As Needed for Moderate Pain . Fill when due    Osteoarthritis of spine with radiculopathy, lumbar region  -     HYDROcodone-acetaminophen (Lortab) 7.5-325 MG per tablet; Take 1 tablet by mouth Every 6 (Six) Hours As Needed for Moderate Pain . Fill when due    Mixed hyperlipidemia  -     HYDROcodone-acetaminophen (Lortab) 7.5-325 MG per tablet; Take 1 tablet by mouth Every 6 (Six) Hours As Needed for Moderate Pain . Fill when due         Stable lower back pain, hydrocodone refill due, sent.   PHQ-2/PHQ-9 Depression Screening 2/14/2020   Little interest or pleasure in doing things 0   Feeling down, depressed, or hopeless 0   Trouble falling or staying asleep, or sleeping too much 0   Feeling tired or having little energy 0   Poor appetite or overeating 0   Feeling bad about yourself - or that you are a failure or have let yourself or your family down 0   Trouble concentrating on things, such as reading the newspaper or watching television 0   Moving or speaking so slowly that other people could have noticed. Or the opposite - being so fidgety or restless that you have been moving around a lot more than usual 0   Thoughts that you would be better off dead, or of hurting yourself in some way 0   Total Score 0    Patient seen in office every 3 months for chronic pain requiring opiate pain medication. Compliant with medication, visits with no adverse effects noted. TERRY and UDS current and appropriate. Patient called requesting scheduled refill at appropriate interval. Patient understands the risks associated with this controlled medication, including tolerance and addiction.  Patient also agrees to only obtain this medication from me, and not from a another provider, unless that provider is covering for me in my absence.  Patient also agrees to be compliant in dosing, and not self adjust the dose of medication.  A signed controlled substance agreement is on file, and the patient has received a controlled substance education sheet at this a previous visit.  The patient has also signed a consent for treatment with a controlled substance as per [facility] policy. TERRY was obtained.         JASMINE Noel         Return in about 12 weeks (around 7/31/2020), or if symptoms worsen or fail to improve.    There are no Patient Instructions on file for this visit.

## 2020-06-09 DIAGNOSIS — M47.26 OSTEOARTHRITIS OF SPINE WITH RADICULOPATHY, LUMBAR REGION: Chronic | ICD-10-CM

## 2020-06-09 DIAGNOSIS — E78.2 MIXED HYPERLIPIDEMIA: ICD-10-CM

## 2020-06-09 DIAGNOSIS — M54.50 CHRONIC MIDLINE LOW BACK PAIN WITHOUT SCIATICA: Chronic | ICD-10-CM

## 2020-06-09 DIAGNOSIS — Z79.899 ENCOUNTER FOR LONG-TERM CURRENT USE OF MEDICATION: ICD-10-CM

## 2020-06-09 DIAGNOSIS — G89.29 CHRONIC MIDLINE LOW BACK PAIN WITHOUT SCIATICA: Chronic | ICD-10-CM

## 2020-06-11 ENCOUNTER — TELEPHONE (OUTPATIENT)
Dept: FAMILY MEDICINE CLINIC | Facility: CLINIC | Age: 65
End: 2020-06-11

## 2020-06-11 RX ORDER — HYDROCODONE BITARTRATE AND ACETAMINOPHEN 7.5; 325 MG/1; MG/1
1 TABLET ORAL EVERY 6 HOURS PRN
Qty: 120 TABLET | Refills: 0 | Status: SHIPPED | OUTPATIENT
Start: 2020-06-11 | End: 2020-07-09 | Stop reason: SDUPTHER

## 2020-06-11 NOTE — TELEPHONE ENCOUNTER
Patient seen in office every 3 months for chronic pain requiring opiate pain medication. Compliant with medication, visits with no adverse effects noted. TERRY and UDS current and appropriate. Patient called requesting scheduled refill at appropriate interval. Patient understands the risks associated with this controlled medication, including tolerance and addiction.  Patient also agrees to only obtain this medication from me, and not from a another provider, unless that provider is covering for me in my absence.  Patient also agrees to be compliant in dosing, and not self adjust the dose of medication.  A signed controlled substance agreement is on file, and the patient has received a controlled substance education sheet at this a previous visit.  The patient has also signed a consent for treatment with a controlled substance as per [facility] policy. TERRY was obtained.   Refill sent for hydrocodone.     This document has been electronically signed by JASMINE Noel on June 11, 2020 17:06

## 2020-06-18 DIAGNOSIS — I10 ESSENTIAL HYPERTENSION: ICD-10-CM

## 2020-06-18 RX ORDER — AMLODIPINE BESYLATE 10 MG/1
10 TABLET ORAL DAILY
Qty: 30 TABLET | Refills: 5 | Status: SHIPPED | OUTPATIENT
Start: 2020-06-18 | End: 2020-12-02

## 2020-06-18 RX ORDER — PRAVASTATIN SODIUM 20 MG
TABLET ORAL
Qty: 45 TABLET | Refills: 3 | OUTPATIENT
Start: 2020-06-18

## 2020-07-06 DIAGNOSIS — F17.211 NICOTINE DEPENDENCE, CIGARETTES, IN REMISSION: ICD-10-CM

## 2020-07-06 DIAGNOSIS — F17.200 CURRENT EVERY DAY SMOKER: Primary | ICD-10-CM

## 2020-07-09 DIAGNOSIS — E78.2 MIXED HYPERLIPIDEMIA: ICD-10-CM

## 2020-07-09 DIAGNOSIS — G89.29 CHRONIC MIDLINE LOW BACK PAIN WITHOUT SCIATICA: Chronic | ICD-10-CM

## 2020-07-09 DIAGNOSIS — M54.50 CHRONIC MIDLINE LOW BACK PAIN WITHOUT SCIATICA: Chronic | ICD-10-CM

## 2020-07-09 DIAGNOSIS — M47.26 OSTEOARTHRITIS OF SPINE WITH RADICULOPATHY, LUMBAR REGION: Chronic | ICD-10-CM

## 2020-07-09 DIAGNOSIS — Z79.899 ENCOUNTER FOR LONG-TERM CURRENT USE OF MEDICATION: ICD-10-CM

## 2020-07-10 ENCOUNTER — TELEPHONE (OUTPATIENT)
Dept: FAMILY MEDICINE CLINIC | Facility: CLINIC | Age: 65
End: 2020-07-10

## 2020-07-10 RX ORDER — HYDROCODONE BITARTRATE AND ACETAMINOPHEN 7.5; 325 MG/1; MG/1
1 TABLET ORAL EVERY 6 HOURS PRN
Qty: 120 TABLET | Refills: 0 | Status: SHIPPED | OUTPATIENT
Start: 2020-07-10 | End: 2020-07-31 | Stop reason: SDUPTHER

## 2020-07-10 NOTE — TELEPHONE ENCOUNTER
Patient seen in office every 3 months for chronic pain requiring opiate pain medication. Compliant with medication, visits with no adverse effects noted. TERRY and UDS current and appropriate. Patient called requesting scheduled refill at appropriate interval. Patient understands the risks associated with this controlled medication, including tolerance and addiction.  Patient also agrees to only obtain this medication from me, and not from a another provider, unless that provider is covering for me in my absence.  Patient also agrees to be compliant in dosing, and not self adjust the dose of medication.  A signed controlled substance agreement is on file, and the patient has received a controlled substance education sheet at this a previous visit.  The patient has also signed a consent for treatment with a controlled substance as per [facility] policy. TERRY was obtained.   Refill sent for hydrocodone.     This document has been electronically signed by JASMINE Noel on July 10, 2020 17:15

## 2020-07-27 DIAGNOSIS — R91.8 MULTIPLE LUNG NODULES ON CT: Primary | ICD-10-CM

## 2020-07-27 DIAGNOSIS — J44.9 CHRONIC OBSTRUCTIVE PULMONARY DISEASE, UNSPECIFIED COPD TYPE (HCC): ICD-10-CM

## 2020-07-27 DIAGNOSIS — R91.1 RIGHT UPPER LOBE PULMONARY NODULE: ICD-10-CM

## 2020-07-29 ENCOUNTER — LAB (OUTPATIENT)
Dept: LAB | Facility: OTHER | Age: 65
End: 2020-07-29

## 2020-07-29 DIAGNOSIS — G89.29 CHRONIC MIDLINE LOW BACK PAIN WITHOUT SCIATICA: Chronic | ICD-10-CM

## 2020-07-29 DIAGNOSIS — M54.50 CHRONIC MIDLINE LOW BACK PAIN WITHOUT SCIATICA: Chronic | ICD-10-CM

## 2020-07-29 DIAGNOSIS — M54.50 CHRONIC MIDLINE LOW BACK PAIN WITHOUT SCIATICA: Primary | Chronic | ICD-10-CM

## 2020-07-29 DIAGNOSIS — G89.29 CHRONIC MIDLINE LOW BACK PAIN WITHOUT SCIATICA: Primary | Chronic | ICD-10-CM

## 2020-07-29 PROCEDURE — G0481 DRUG TEST DEF 8-14 CLASSES: HCPCS | Performed by: NURSE PRACTITIONER

## 2020-07-29 PROCEDURE — 80307 DRUG TEST PRSMV CHEM ANLYZR: CPT | Performed by: NURSE PRACTITIONER

## 2020-07-30 RX ORDER — PRAVASTATIN SODIUM 20 MG
20 TABLET ORAL NIGHTLY
Qty: 45 TABLET | Refills: 3 | Status: SHIPPED | OUTPATIENT
Start: 2020-07-30 | End: 2021-01-29

## 2020-07-31 ENCOUNTER — LAB (OUTPATIENT)
Dept: LAB | Facility: OTHER | Age: 65
End: 2020-07-31

## 2020-07-31 ENCOUNTER — OFFICE VISIT (OUTPATIENT)
Dept: FAMILY MEDICINE CLINIC | Facility: CLINIC | Age: 65
End: 2020-07-31

## 2020-07-31 VITALS
SYSTOLIC BLOOD PRESSURE: 140 MMHG | HEIGHT: 72 IN | OXYGEN SATURATION: 98 % | BODY MASS INDEX: 21.63 KG/M2 | DIASTOLIC BLOOD PRESSURE: 70 MMHG | TEMPERATURE: 98.8 F | WEIGHT: 159.7 LBS | HEART RATE: 92 BPM

## 2020-07-31 DIAGNOSIS — D75.1 POLYCYTHEMIA: Primary | ICD-10-CM

## 2020-07-31 DIAGNOSIS — M54.50 CHRONIC MIDLINE LOW BACK PAIN WITHOUT SCIATICA: Chronic | ICD-10-CM

## 2020-07-31 DIAGNOSIS — G89.29 CHRONIC MIDLINE LOW BACK PAIN WITHOUT SCIATICA: Chronic | ICD-10-CM

## 2020-07-31 DIAGNOSIS — M47.26 OSTEOARTHRITIS OF SPINE WITH RADICULOPATHY, LUMBAR REGION: Chronic | ICD-10-CM

## 2020-07-31 DIAGNOSIS — E78.2 MIXED HYPERLIPIDEMIA: ICD-10-CM

## 2020-07-31 DIAGNOSIS — Z79.899 ENCOUNTER FOR LONG-TERM CURRENT USE OF MEDICATION: ICD-10-CM

## 2020-07-31 LAB
BASOPHILS # BLD AUTO: 0.04 10*3/MM3 (ref 0–0.2)
BASOPHILS NFR BLD AUTO: 0.5 % (ref 0–1.5)
DEPRECATED RDW RBC AUTO: 46.1 FL (ref 37–54)
EOSINOPHIL # BLD AUTO: 0.2 10*3/MM3 (ref 0–0.4)
EOSINOPHIL NFR BLD AUTO: 2.6 % (ref 0.3–6.2)
ERYTHROCYTE [DISTWIDTH] IN BLOOD BY AUTOMATED COUNT: 13.7 % (ref 12.3–15.4)
HCT VFR BLD AUTO: 49.5 % (ref 37.5–51)
HGB BLD-MCNC: 17.5 G/DL (ref 13–17.7)
LYMPHOCYTES # BLD AUTO: 1.03 10*3/MM3 (ref 0.7–3.1)
LYMPHOCYTES NFR BLD AUTO: 13.2 % (ref 19.6–45.3)
MCH RBC QN AUTO: 33.1 PG (ref 26.6–33)
MCHC RBC AUTO-ENTMCNC: 35.4 G/DL (ref 31.5–35.7)
MCV RBC AUTO: 93.6 FL (ref 79–97)
MONOCYTES # BLD AUTO: 0.71 10*3/MM3 (ref 0.1–0.9)
MONOCYTES NFR BLD AUTO: 9.1 % (ref 5–12)
NEUTROPHILS NFR BLD AUTO: 5.82 10*3/MM3 (ref 1.7–7)
NEUTROPHILS NFR BLD AUTO: 74.6 % (ref 42.7–76)
PLATELET # BLD AUTO: 287 10*3/MM3 (ref 140–450)
PMV BLD AUTO: 9.1 FL (ref 6–12)
RBC # BLD AUTO: 5.29 10*6/MM3 (ref 4.14–5.8)
WBC # BLD AUTO: 7.8 10*3/MM3 (ref 3.4–10.8)

## 2020-07-31 PROCEDURE — 85025 COMPLETE CBC W/AUTO DIFF WBC: CPT | Performed by: NURSE PRACTITIONER

## 2020-07-31 PROCEDURE — 99214 OFFICE O/P EST MOD 30 MIN: CPT | Performed by: NURSE PRACTITIONER

## 2020-07-31 PROCEDURE — 36415 COLL VENOUS BLD VENIPUNCTURE: CPT | Performed by: NURSE PRACTITIONER

## 2020-07-31 RX ORDER — HYDROCODONE BITARTRATE AND ACETAMINOPHEN 7.5; 325 MG/1; MG/1
1 TABLET ORAL EVERY 6 HOURS PRN
Qty: 120 TABLET | Refills: 0 | Status: SHIPPED | OUTPATIENT
Start: 2020-07-31 | End: 2020-09-09 | Stop reason: SDUPTHER

## 2020-07-31 NOTE — PROGRESS NOTES
"Chief Complaint   Patient presents with   • Follow-up     12 wk for LBP, polycythemia   • Med Refill   • Back Pain     Subjective   Derrell Bynum is a 65 y.o. male who presents to the office for routine follow up of chronic lower back pain requring hydrocodone, and for routine follow up of polycythemia..    The following portions of the patient's history were reviewed and updated as appropriate: allergies, current medications, past family history, past medical history, past social history, past surgical history and problem list.    History of Present Illness   Chronic Lower Back Pain.  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  Location is midline/ bilateral lower back, radiating down left leg at times.   Onset gradual and gradually worsening over the years.   Pain is aching. It is steady/ constant, worsens with twisting or lifing.   Pain is consistently present through day and night.   Stiffness is worse in the morning.   Pain is rated at 7/10 today and says it is sometimes a 4/10 on a 'good day'. feels is well controlled.  This waxes and wanes with activity.   At times with hydrocodone pain gets as low as a 4, which patient states \"I can deal with\", and feels is well controlled currently   Now on hydrocodone 7.5/325mg 1 po Q6H prn pain, due for refill.     Polycythemia: normal H/H on 2/02/20, due for repeat lab toay.  UDS pending.  Hyperlipidemia: improving in December, statin decreased to 10mg then, with good labs 2/2/20, due for repeat before next visit.    No new complaints today.  Parts of most recent relevant visit HPI, ROS  and PE may be carried forward and all are updated as appropriate for current situation  Past Medical History:   Diagnosis Date   • Acute bacterial sinusitis    • Acute pharyngitis    • Acute sinusitis    • Allergic rhinitis    • Chronic obstructive pulmonary disease with acute lower respiratory infection (CMS/HCC)    • Chronic " "sinusitis    • Coalworker's pneumoconiosis (CMS/HCC)    • Contact dermatitis    • COPD (chronic obstructive pulmonary disease) (CMS/HCC)    • Cough    • Dyspnea    • Encounter for immunization    • Erythrocytosis    • Essential hypertension    • Fever    • Hemoptysis    • Hoarse    • Hyperlipidemia    • Insect bite, nonvenomous of shoulder and upper arm, infected    • Malaise and fatigue     Other   • Need for immunization against influenza    • Osteoarthritis     Chronic daily pain   • Smokes tobacco daily    • Tobacco dependence syndrome    • Viral gastroenteritis 10/10/2013    Resolved   • Wheezing           Family History   Problem Relation Age of Onset   • Colon cancer Father         Colorectal Cancer   • Diabetes Father         Review of Systems   Constitutional: Negative.  Negative for activity change, fever and unexpected weight change.   HENT: Negative.    Eyes: Negative.    Respiratory: Negative.  Negative for cough, chest tightness and shortness of breath.    Cardiovascular: Negative.  Negative for chest pain.   Gastrointestinal: Negative.  Negative for constipation.   Endocrine: Negative.    Genitourinary: Negative.  Negative for difficulty urinating and dysuria.   Musculoskeletal: Positive for back pain.   Skin: Negative.  Negative for color change, pallor, rash and wound.   Allergic/Immunologic: Negative.    Neurological: Negative.  Negative for weakness and numbness.   Hematological: Negative.    Psychiatric/Behavioral: Negative.  Negative for sleep disturbance and suicidal ideas.   All other systems reviewed and are negative.      Objective   Vitals:    07/31/20 0822   BP: 140/70   BP Location: Left arm   Patient Position: Sitting   Cuff Size: Adult   Pulse: 92   Temp: 98.8 °F (37.1 °C)   SpO2: 98%   Weight: 72.4 kg (159 lb 11.2 oz)   Height: 182.9 cm (72\")   PainSc:   7   PainLoc: Back     Physical Exam   Constitutional: He is oriented to person, place, and time. He appears well-developed and " well-nourished.   HENT:   Head: Normocephalic and atraumatic.   Eyes: Pupils are equal, round, and reactive to light. Conjunctivae are normal.   Neck: Normal range of motion. Neck supple.   Cardiovascular: Normal rate, regular rhythm, normal heart sounds and intact distal pulses. Exam reveals no gallop and no friction rub.   No murmur heard.  Pulmonary/Chest: Effort normal and breath sounds normal. No respiratory distress. He has no wheezes. He has no rales. He exhibits no tenderness.   Abdominal: Soft. Bowel sounds are normal. He exhibits no distension and no mass. There is no tenderness. There is no rebound and no guarding.   Musculoskeletal: He exhibits tenderness. He exhibits no edema or deformity.        Lumbar back: He exhibits decreased range of motion, tenderness, bony tenderness and pain.        Back:    Spinal and paraspinal tenderness throughout lumbar region   Lymphadenopathy:     He has no cervical adenopathy.   Neurological: He is alert and oriented to person, place, and time.   Skin: Skin is warm and dry. Capillary refill takes 2 to 3 seconds. No erythema. No pallor.   Psychiatric: He has a normal mood and affect. His behavior is normal. Judgment and thought content normal.   Nursing note and vitals reviewed.      Assessment/Plan   Derrell was seen today for follow-up, med refill and back pain.    Diagnoses and all orders for this visit:    Polycythemia  -     CBC & Differential    Chronic midline low back pain without sciatica  Comments:  controlled to stated satisfaction today. able to garden and work as needed, and sleep without waking due to pain.   Orders:  -     HYDROcodone-acetaminophen (Lortab) 7.5-325 MG per tablet; Take 1 tablet by mouth Every 6 (Six) Hours As Needed for Moderate Pain . Fill when due    Encounter for long-term current use of medication  -     HYDROcodone-acetaminophen (Lortab) 7.5-325 MG per tablet; Take 1 tablet by mouth Every 6 (Six) Hours As Needed for Moderate Pain . Fill  when due    Osteoarthritis of spine with radiculopathy, lumbar region  -     HYDROcodone-acetaminophen (Lortab) 7.5-325 MG per tablet; Take 1 tablet by mouth Every 6 (Six) Hours As Needed for Moderate Pain . Fill when due    Mixed hyperlipidemia  -     HYDROcodone-acetaminophen (Lortab) 7.5-325 MG per tablet; Take 1 tablet by mouth Every 6 (Six) Hours As Needed for Moderate Pain . Fill when due  -     Lipid Panel; Future         Discussed CT chest screening results with new interval development of lesion RUL which is recommended for repeat CT in 3 months or [place] pet scan. A Pet scan is pending. Has established with JASMINE Anderson in pulmonology at [facility], last visit there was stable on 6/24/20. Will convey findings and plans to her and cancel referral to Dr Xiomara Antonio accordingly.   PHQ-2/PHQ-9 Depression Screening 2/14/2020   Little interest or pleasure in doing things 0   Feeling down, depressed, or hopeless 0   Trouble falling or staying asleep, or sleeping too much 0   Feeling tired or having little energy 0   Poor appetite or overeating 0   Feeling bad about yourself - or that you are a failure or have let yourself or your family down 0   Trouble concentrating on things, such as reading the newspaper or watching television 0   Moving or speaking so slowly that other people could have noticed. Or the opposite - being so fidgety or restless that you have been moving around a lot more than usual 0   Thoughts that you would be better off dead, or of hurting yourself in some way 0   Total Score 0   Patient understands the risks associated with this controlled medication, including tolerance and addiction.  Patient also agrees to only obtain this medication from me, and not from a another provider, unless that provider is covering for me in my absence.  Patient also agrees to be compliant in dosing, and not self adjust the dose of medication.  A signed controlled substance agreement is on file,  and the patient has received a controlled substance education sheet at this a previous visit.  The patient has also signed a consent for treatment with a controlled substance as per [facility] policy. TERRY was obtained.      JASMINE Noel         Return in about 12 weeks (around 10/23/2020).    There are no Patient Instructions on file for this visit.

## 2020-08-03 LAB
6-ACETYLMORPHINE: NEGATIVE
6MAM SERPLBLD-MCNC: NOT DETECTED NG/MG CREAT
7-AMINOCLONAZEPAM UR: NOT DETECTED NG/MG CREAT
A-OH ALPRAZ/CREAT UR: NOT DETECTED NG/MG CREAT
ALFENTANIL UR QL: NOT DETECTED NG/MG CREAT
ALPHA-HYDROXYMIDAZOLAM, URINE: NOT DETECTED NG/MG CREAT
ALPHA-HYDROXYTRIAZOLAM, URINE: NOT DETECTED NG/MG CREAT
AMOBARBITAL UR: NOT DETECTED
AMPHET UR QL CFM: NOT DETECTED NG/MG CREAT
AMPHETAMINES UR QL SCN: NEGATIVE
BARBITAL UR QL CFM: NOT DETECTED
BARBITURATES UR QL SCN: NEGATIVE
BENZODIAZ UR QL SCN: NEGATIVE
BENZOYLECGONINE UR: NOT DETECTED NG/MG CREAT
BUPRENORPHINE UR QL: NEGATIVE
BUPRENORPHINE UR QL: NOT DETECTED NG/MG CREAT
BUTABARBITAL UR QL: NOT DETECTED
BUTALBITAL UR QL: NOT DETECTED
CANNABINOIDS UR QL CFM: NEGATIVE
CLONAZEPAM UR QL: NOT DETECTED NG/MG CREAT
COCAETHYLENE UR QL CFM: NOT DETECTED NG/MG CREAT
COCAINE UR QL CFM: NEGATIVE
CODEINE UR QL: NOT DETECTED NG/MG CREAT
CONV COCAINE, UR: NOT DETECTED NG/MG CREAT
CONV REPORT SUMMARY: NORMAL
CREAT 24H UR-MCNC: 218 MG/DL
DESALKYLFLURAZ/CREAT UR: NOT DETECTED NG/MG CREAT
DESMETHYLFLUNITRAZEPAM: NOT DETECTED NG/MG CREAT
DIAZEPAM UR-MCNC: NOT DETECTED NG/MG CREAT
DIHYDROCODEINE UR: 74 NG/MG CREAT
EDDP SERPL QL: NOT DETECTED NG/MG CREAT
ETHANOL SCREEN URINE (REF): NEGATIVE
ETHANOL UR-MCNC: NOT DETECTED G/DL
FENTANYL UR QL: NEGATIVE
FENTANYL+NORFENTANYL UR QL SCN: NOT DETECTED NG/MG CREAT
FLUNITRAZEPAM SERPLBLD-MCNC: NOT DETECTED NG/MG CREAT
HYDROCODONE UR QL: 499 NG/MG CREAT
HYDROMORPHONE UR QL: 505 NG/MG CREAT
LEVEL OF DETECTION:: NORMAL
LORAZEPAM UR-MCNC: NOT DETECTED NG/MG CREAT
LORAZEPAM/CREAT UR: NOT DETECTED NG/MG CREAT
MDA SERPLBLD-MCNC: NOT DETECTED NG/MG CREAT
MDMA UR QL SCN: NOT DETECTED NG/MG CREAT
MEPHOBARBITAL UR QL CFM: NOT DETECTED
METHADONE BLD QL SCN: NEGATIVE
METHADONE, URINE: NOT DETECTED NG/MG CREAT
METHAMPHETAMINE UR: NOT DETECTED NG/MG CREAT
MIDAZOLAM UR-MCNC: NOT DETECTED NG/MG CREAT
MORPHINE UR QL: NOT DETECTED NG/MG CREAT
N-DESMETHYLTRAMADOL, U: NOT DETECTED NG/MG CREAT
NARCOTICS UR: NEGATIVE
NORBUPRENORPHINE SERPLBLD-MCNC: NOT DETECTED NG/MG CREAT
NORCODEINE UR-MCNC: NOT DETECTED NG/MG CREAT
NORDIAZEPAM SERPL CFM-MCNC: NOT DETECTED NG/MG CREAT
NORFENTANYL UR: NOT DETECTED NG/MG CREAT
NORMORPHINE: NOT DETECTED NG/MG CREAT
NOROXYMORPHONE: NOT DETECTED NG/MG CREAT
O-DESMETHYLTRAMADOL, UR: NOT DETECTED NG/MG CREAT
OPIATES UR QL CFM: NORMAL
OPIATES, URINE, NORHYDROCODONE: 759 NG/MG CREAT
OPIATES, URINE, NOROXYCODONE: NOT DETECTED NG/MG CREAT
OXAZEPAM UR QL: NOT DETECTED NG/MG CREAT
OXYCODONE UR QL: NEGATIVE
OXYCODONE UR: NOT DETECTED NG/MG CREAT
OXYMORPHONE UR: NOT DETECTED NG/MG CREAT
PENTOBARBITAL UR: NOT DETECTED
PHENOBARB UR QL: NOT DETECTED
SECOBARBITAL UR QL: NOT DETECTED
SUFENTANIL UR QL: NOT DETECTED NG/MG CREAT
TAPENTADOL SERPLBLD-MCNC: NEGATIVE NG/ML
TAPENTADOL UR-MCNC: NOT DETECTED NG/MG CREAT
TEMAZEPAM UR QL CFM: NOT DETECTED NG/MG CREAT
THC UR CFM-MCNC: NOT DETECTED NG/MG CREAT
THIOPENTAL UR QL CFM: NOT DETECTED
TRAMADOL UR: NOT DETECTED NG/MG CREAT

## 2020-08-10 DIAGNOSIS — M47.26 OSTEOARTHRITIS OF SPINE WITH RADICULOPATHY, LUMBAR REGION: Chronic | ICD-10-CM

## 2020-08-10 DIAGNOSIS — M54.50 CHRONIC MIDLINE LOW BACK PAIN WITHOUT SCIATICA: Chronic | ICD-10-CM

## 2020-08-10 DIAGNOSIS — E78.2 MIXED HYPERLIPIDEMIA: ICD-10-CM

## 2020-08-10 DIAGNOSIS — G89.29 CHRONIC MIDLINE LOW BACK PAIN WITHOUT SCIATICA: Chronic | ICD-10-CM

## 2020-08-10 DIAGNOSIS — Z79.899 ENCOUNTER FOR LONG-TERM CURRENT USE OF MEDICATION: ICD-10-CM

## 2020-08-10 RX ORDER — HYDROCODONE BITARTRATE AND ACETAMINOPHEN 7.5; 325 MG/1; MG/1
1 TABLET ORAL EVERY 6 HOURS PRN
Qty: 120 TABLET | Refills: 0 | OUTPATIENT
Start: 2020-08-10

## 2020-08-10 NOTE — TELEPHONE ENCOUNTER
Last refill sent 7/21/20 requests hydrocodone too soon, denied refill request and note to nurse to call pharmacy to see if a refill exists there he is not aware of and barring this, have him call again in a week, closer to time due. [place]

## 2020-09-09 ENCOUNTER — TELEPHONE (OUTPATIENT)
Dept: FAMILY MEDICINE CLINIC | Facility: CLINIC | Age: 65
End: 2020-09-09

## 2020-09-09 DIAGNOSIS — G89.29 CHRONIC MIDLINE LOW BACK PAIN WITHOUT SCIATICA: Chronic | ICD-10-CM

## 2020-09-09 DIAGNOSIS — Z79.899 ENCOUNTER FOR LONG-TERM CURRENT USE OF MEDICATION: ICD-10-CM

## 2020-09-09 DIAGNOSIS — M47.26 OSTEOARTHRITIS OF SPINE WITH RADICULOPATHY, LUMBAR REGION: Chronic | ICD-10-CM

## 2020-09-09 DIAGNOSIS — M54.50 CHRONIC MIDLINE LOW BACK PAIN WITHOUT SCIATICA: Chronic | ICD-10-CM

## 2020-09-09 DIAGNOSIS — E78.2 MIXED HYPERLIPIDEMIA: ICD-10-CM

## 2020-09-09 RX ORDER — HYDROCODONE BITARTRATE AND ACETAMINOPHEN 7.5; 325 MG/1; MG/1
1 TABLET ORAL EVERY 6 HOURS PRN
Qty: 120 TABLET | Refills: 0 | Status: SHIPPED | OUTPATIENT
Start: 2020-09-09 | End: 2020-10-07 | Stop reason: SDUPTHER

## 2020-09-09 NOTE — TELEPHONE ENCOUNTER
Patient seen in office every 3 months for chronic pain requiring opiate pain medication. Compliant with medication, visits with no adverse effects noted. TERRY and UDS current and appropriate. Patient called requesting scheduled refill at appropriate interval. Patient understands the risks associated with this controlled medication, including tolerance and addiction.  Patient also agrees to only obtain this medication from me, and not from a another provider, unless that provider is covering for me in my absence.  Patient also agrees to be compliant in dosing, and not self adjust the dose of medication.  A signed controlled substance agreement is on file, and the patient has received a controlled substance education sheet at this a previous visit.  The patient has also signed a consent for treatment with a controlled substance as per [facility] policy. TERRY was obtained.   Refill sent for hydrocodone.     This document has been electronically signed by JASMINE Noel on September 9, 2020 17:15

## 2020-10-08 DIAGNOSIS — M47.26 OSTEOARTHRITIS OF SPINE WITH RADICULOPATHY, LUMBAR REGION: Chronic | ICD-10-CM

## 2020-10-08 DIAGNOSIS — G89.29 CHRONIC MIDLINE LOW BACK PAIN WITHOUT SCIATICA: Chronic | ICD-10-CM

## 2020-10-08 DIAGNOSIS — M54.50 CHRONIC MIDLINE LOW BACK PAIN WITHOUT SCIATICA: Chronic | ICD-10-CM

## 2020-10-08 DIAGNOSIS — Z79.899 ENCOUNTER FOR LONG-TERM CURRENT USE OF MEDICATION: ICD-10-CM

## 2020-10-08 DIAGNOSIS — E78.2 MIXED HYPERLIPIDEMIA: ICD-10-CM

## 2020-10-09 ENCOUNTER — TELEPHONE (OUTPATIENT)
Dept: FAMILY MEDICINE CLINIC | Facility: CLINIC | Age: 65
End: 2020-10-09

## 2020-10-09 RX ORDER — HYDROCODONE BITARTRATE AND ACETAMINOPHEN 7.5; 325 MG/1; MG/1
1 TABLET ORAL EVERY 6 HOURS PRN
Qty: 120 TABLET | Refills: 0 | Status: SHIPPED | OUTPATIENT
Start: 2020-10-09 | End: 2020-11-09 | Stop reason: SDUPTHER

## 2020-10-09 NOTE — TELEPHONE ENCOUNTER
Patient has chronic anxiety requiring sparing use of benzodiazepine medication, concurrently with chronic pain requiring opiate pain medication. Patient has been educated regarding potential dangers of oversedation and accidental overdose with use of both medications concurrently. Serial assessments of patient has yielded no sign of oversedation or adverse effects of patient, and he/she is advised and aware to notify my office immediately if symptoms do occur, as well as to discontinue use of  benzodiazepine and opiate medication immediately if that occurs, pending medical evaluation and advice. Patient has been compliant with use of medications, UDS, visits with no adverse effects noted. Patient understands the risks associated with this controlled medication, including tolerance and addiction.  Patient also agrees to only obtain this medication from me, and not from a another provider, unless that provider is covering for me in my absence.  Patient also agrees to be compliant in dosing, and not self adjust the dose of medication.  A signed controlled substance agreement is on file, and the patient has received a controlled substance education sheet at this a previous visit.  The patient has also signed a consent for treatment with a controlled substance as per [facility] policy. TERRY was obtained. Refills were sent for:   Hydrocodone.     This document has been electronically signed by JASMINE Noel on October 9, 2020 13:43 CDT

## 2020-10-22 ENCOUNTER — OFFICE VISIT (OUTPATIENT)
Dept: FAMILY MEDICINE CLINIC | Facility: CLINIC | Age: 65
End: 2020-10-22

## 2020-10-22 VITALS
HEART RATE: 69 BPM | WEIGHT: 155.4 LBS | SYSTOLIC BLOOD PRESSURE: 130 MMHG | DIASTOLIC BLOOD PRESSURE: 74 MMHG | OXYGEN SATURATION: 97 % | TEMPERATURE: 97 F | HEIGHT: 72 IN | RESPIRATION RATE: 18 BRPM | BODY MASS INDEX: 21.05 KG/M2

## 2020-10-22 DIAGNOSIS — M54.50 CHRONIC MIDLINE LOW BACK PAIN WITHOUT SCIATICA: Chronic | ICD-10-CM

## 2020-10-22 DIAGNOSIS — Z23 NEED FOR INFLUENZA VACCINATION: ICD-10-CM

## 2020-10-22 DIAGNOSIS — R91.1 RIGHT UPPER LOBE PULMONARY NODULE: ICD-10-CM

## 2020-10-22 DIAGNOSIS — Z00.00 MEDICARE ANNUAL WELLNESS VISIT, SUBSEQUENT: Primary | ICD-10-CM

## 2020-10-22 DIAGNOSIS — M47.26 OSTEOARTHRITIS OF SPINE WITH RADICULOPATHY, LUMBAR REGION: Chronic | ICD-10-CM

## 2020-10-22 DIAGNOSIS — Z23 NEED FOR VACCINATION: ICD-10-CM

## 2020-10-22 DIAGNOSIS — G89.29 CHRONIC MIDLINE LOW BACK PAIN WITHOUT SCIATICA: Chronic | ICD-10-CM

## 2020-10-22 PROCEDURE — 96160 PT-FOCUSED HLTH RISK ASSMT: CPT | Performed by: NURSE PRACTITIONER

## 2020-10-22 PROCEDURE — 90686 IIV4 VACC NO PRSV 0.5 ML IM: CPT | Performed by: NURSE PRACTITIONER

## 2020-10-22 PROCEDURE — G0439 PPPS, SUBSEQ VISIT: HCPCS | Performed by: NURSE PRACTITIONER

## 2020-10-22 PROCEDURE — G0008 ADMIN INFLUENZA VIRUS VAC: HCPCS | Performed by: NURSE PRACTITIONER

## 2020-10-22 RX ORDER — HYDROCODONE BITARTRATE AND ACETAMINOPHEN 7.5; 325 MG/1; MG/1
1 TABLET ORAL EVERY 6 HOURS PRN
Qty: 120 TABLET | Refills: 0 | Status: CANCELLED | OUTPATIENT
Start: 2020-10-22

## 2020-10-22 NOTE — PROGRESS NOTES
The ABCs of the Annual Wellness Visit  Subsequent Medicare Wellness Visit    Chief Complaint   Patient presents with   • Medicare Wellness-subsequent   • Follow-up     12 week    • Back Pain       Subjective   History of Present Illness:  Derrell Bynum is a 65 y.o. male who presents for a Subsequent Medicare Wellness Visit.  Also for routine follow up of chronic lower back pain related to DDD lumbar spine: onset over 1 year ago progressively, gradually worsening. Managed with hydrocodone. Not due for refill at this time. Today reports pain managed well and improves his ability to remain as active as desired with ADLs and recreational activities. Denies adverse effects of medicatoin.   HEALTH RISK ASSESSMENT    Recent Hospitalizations:  No hospitalization(s) within the last year.    Current Medical Providers:  Patient Care Team:  Lou Hinds APRN as PCP - General (Family Medicine)    Smoking Status:  Social History     Tobacco Use   Smoking Status Current Every Day Smoker   • Packs/day: 1.00   • Years: 35.00   • Pack years: 35.00   • Types: Cigarettes   Smokeless Tobacco Never Used   Tobacco Comment    20-39 cigarettes/day (5/9/16)       Alcohol Consumption:  Social History     Substance and Sexual Activity   Alcohol Use Yes    Comment: Beer - 8 drinks a day. A  typical drinking day (5/9/16)       Depression Screen:   PHQ-2/PHQ-9 Depression Screening 10/22/2020   Little interest or pleasure in doing things 0   Feeling down, depressed, or hopeless 0   Trouble falling or staying asleep, or sleeping too much 0   Feeling tired or having little energy 0   Poor appetite or overeating 0   Feeling bad about yourself - or that you are a failure or have let yourself or your family down 0   Trouble concentrating on things, such as reading the newspaper or watching television 0   Moving or speaking so slowly that other people could have noticed. Or the opposite - being so fidgety or restless that you have been moving  around a lot more than usual 0   Thoughts that you would be better off dead, or of hurting yourself in some way 0   Total Score 0       Fall Risk Screen:  JACKIE Fall Risk Assessment has not been completed.    Health Habits and Functional and Cognitive Screening:  Functional & Cognitive Status 10/22/2020   Do you have difficulty preparing food and eating? No   Do you have difficulty bathing yourself, getting dressed or grooming yourself? No   Do you have difficulty using the toilet? No   Do you have difficulty moving around from place to place? No   Do you have trouble with steps or getting out of a bed or a chair? No   Current Diet Well Balanced Diet   Dental Exam Not up to date   Eye Exam Not up to date        Eye Exam Comment -   Exercise (times per week) 0 times per week   Current Exercise Activities Include -   Do you need help using the phone?  No   Are you deaf or do you have serious difficulty hearing?  No   Do you need help with transportation? No   Do you need help shopping? No   Do you need help preparing meals?  No   Do you need help with housework?  No   Do you need help with laundry? No   Do you need help taking your medications? No   Do you need help managing money? No   Do you ever drive or ride in a car without wearing a seat belt? No   Have you felt unusual stress, anger or loneliness in the last month? No   Who do you live with? Spouse   If you need help, do you have trouble finding someone available to you? No   Have you been bothered in the last four weeks by sexual problems? No   Do you have difficulty concentrating, remembering or making decisions? No         Does the patient have evidence of cognitive impairment? No    Asprin use counseling:Taking ASA appropriately as indicated    Age-appropriate Screening Schedule:  Refer to the list below for future screening recommendations based on patient's age, sex and/or medical conditions. Orders for these recommended tests are listed in the plan  section. The patient has been provided with a written plan.    Health Maintenance   Topic Date Due   • ZOSTER VACCINE (2 of 2) 07/17/2017   • INFLUENZA VACCINE  08/01/2020   • LIPID PANEL  02/20/2021   • COLONOSCOPY  05/22/2024   • TDAP/TD VACCINES (2 - Td) 05/22/2027          The following portions of the patient's history were reviewed and updated as appropriate: He  has a past medical history of Acute bacterial sinusitis, Acute pharyngitis, Acute sinusitis, Allergic rhinitis, Chronic obstructive pulmonary disease with acute lower respiratory infection (CMS/HCC), Chronic sinusitis, Coalworker's pneumoconiosis (CMS/HCC), Contact dermatitis, COPD (chronic obstructive pulmonary disease) (CMS/HCC), Cough, Dyspnea, Encounter for immunization, Erythrocytosis, Essential hypertension, Fever, Hemoptysis, Hoarse, Hyperlipidemia, Insect bite, nonvenomous of shoulder and upper arm, infected, Malaise and fatigue, Need for immunization against influenza, Osteoarthritis, Smokes tobacco daily, Tobacco dependence syndrome, Viral gastroenteritis (10/10/2013), and Wheezing..    Outpatient Medications Prior to Visit   Medication Sig Dispense Refill   • albuterol (PROVENTIL) (2.5 MG/3ML) 0.083% nebulizer solution Take 2.5 mg by nebulization Every 4 (Four) Hours As Needed for wheezing (Neb tx q6hrs prn cough, congestion, wheezing).     • albuterol sulfate HFA (VENTOLIN HFA) 108 (90 Base) MCG/ACT inhaler Inhale 2 puffs Every 6 (Six) Hours As Needed for Wheezing. 1 inhaler 11   • amLODIPine (NORVASC) 10 MG tablet TAKE 1 TABLET BY MOUTH DAILY. 30 tablet 5   • Ascorbic Acid (VITAMIN C) 500 MG capsule      • aspirin 81 MG chewable tablet Chew 81 mg Daily.     • CloNIDine (CATAPRES) 0.1 MG tablet Take 1 tablet by mouth 2 (Two) Times a Day. If needed for B/P 150/90 or higher. 60 tablet 5   • fluticasone (FLONASE) 50 MCG/ACT nasal spray 1 spray into each nostril 2 (Two) Times a Day.     • Fluticasone Furoate-Vilanterol (BREO ELLIPTA) 100-25  MCG/INH inhaler Inhale 1 puff Daily. 1 each 5   • HYDROcodone-acetaminophen (Lortab) 7.5-325 MG per tablet Take 1 tablet by mouth Every 6 (Six) Hours As Needed for Moderate Pain . Fill when due 120 tablet 0   • montelukast (SINGULAIR) 10 MG tablet Take 1 tablet by mouth Every Night. 30 tablet 5   • pravastatin (PRAVACHOL) 10 MG tablet Take 1 tablet by mouth Every Night. 90 tablet 1   • pravastatin (PRAVACHOL) 20 MG tablet Take 1 tablet by mouth Every Night. 45 tablet 3   • tamsulosin (FLOMAX) 0.4 MG capsule 24 hr capsule Take 1 capsule by mouth Daily. 30 capsule 11   • tiZANidine (ZANAFLEX) 2 MG tablet Take 1 tablet by mouth Every 8 (Eight) Hours As Needed for Muscle Spasms. 30 tablet 5     No facility-administered medications prior to visit.        Patient Active Problem List   Diagnosis   • Coalworker's pneumoconiosis (CMS/[facility])   • COPD (chronic obstructive pulmonary disease) (CMS/[facility])   • Essential hypertension   • Polycythemia   • Muscle spasm   • Osteoarthritis of spine   • Encounter for long-term current use of medication   • Chronic midline low back pain without sciatica   • Encounter for long-term use of opiate analgesic   • Smoking   • Abnormal x-ray of lumbar spine   • Mixed hyperlipidemia   • Chronic nonseasonal allergic rhinitis due to pollen       Advanced Care Planning:  ACP discussion was held with the patient during this visit. Patient does not have an advance directive, information provided.    Review of Systems   Constitutional: Negative.  Negative for fever and unexpected weight change.   HENT: Negative.    Eyes: Negative.    Respiratory: Positive for shortness of breath. Negative for cough and chest tightness.    Cardiovascular: Negative.  Negative for chest pain.   Gastrointestinal: Negative.    Endocrine: Negative.    Genitourinary: Negative.  Negative for dysuria.   Musculoskeletal: Negative.    Skin: Negative.  Negative for color change, pallor, rash and wound.   Allergic/Immunologic:  "Negative.    Neurological: Negative.    Hematological: Negative.    Psychiatric/Behavioral: Negative.  Negative for sleep disturbance and suicidal ideas.   All other systems reviewed and are negative.      Compared to one year ago, the patient feels his physical health is the same.  Compared to one year ago, the patient feels his mental health is the same.    Reviewed chart for potential of high risk medication in the elderly: yes  Reviewed chart for potential of harmful drug interactions in the elderly:yes    Objective         Vitals:    10/22/20 0837   BP: 130/74   BP Location: Left arm   Patient Position: Sitting   Pulse: 69   Resp: 18   Temp: 97 °F (36.1 °C)   SpO2: 97%   Weight: 70.5 kg (155 lb 6.4 oz)   Height: 183 cm (72.03\")   PainSc:   7   PainLoc: Back       Body mass index is 21.06 kg/m².  Discussed the patient's BMI with him. The BMI is in the acceptable range.    Physical Exam  Vitals signs and nursing note reviewed.   Constitutional:       General: He is not in acute distress.     Appearance: Normal appearance. He is well-developed and normal weight. He is not diaphoretic.   HENT:      Head: Normocephalic and atraumatic.      Nose: Nose normal.   Eyes:      General: No scleral icterus.        Right eye: No discharge.         Left eye: No discharge.      Extraocular Movements: Extraocular movements intact.      Conjunctiva/sclera: Conjunctivae normal.      Pupils: Pupils are equal, round, and reactive to light.   Neck:      Musculoskeletal: Normal range of motion and neck supple.      Thyroid: No thyromegaly.      Vascular: No JVD.      Trachea: No tracheal deviation.   Cardiovascular:      Rate and Rhythm: Normal rate and regular rhythm.      Pulses: Normal pulses.      Heart sounds: Normal heart sounds. No murmur. No friction rub. No gallop.    Pulmonary:      Effort: Pulmonary effort is normal. No respiratory distress.      Breath sounds: Normal breath sounds. No stridor. No wheezing, rhonchi or " rales.   Chest:      Chest wall: No tenderness.   Abdominal:      General: Bowel sounds are normal.      Palpations: Abdomen is soft.   Musculoskeletal: Normal range of motion.         General: No swelling, tenderness or deformity.      Lumbar back: He exhibits pain.        Back:       Right lower leg: No edema.      Left lower leg: No edema.   Lymphadenopathy:      Cervical: No cervical adenopathy.   Skin:     General: Skin is warm and dry.      Capillary Refill: Capillary refill takes 2 to 3 seconds.      Coloration: Skin is not pale.      Findings: No erythema or rash.   Neurological:      General: No focal deficit present.      Mental Status: He is alert and oriented to person, place, and time.      Cranial Nerves: No cranial nerve deficit.   Psychiatric:         Mood and Affect: Mood normal.         Behavior: Behavior normal.         Thought Content: Thought content normal.         Judgment: Judgment normal.             Derrell Bynum  reports that he has been smoking cigarettes. He has a 35.00 pack-year smoking history. He has never used smokeless tobacco.. I have educated him on the risk of diseases from using tobacco products such as cancer, COPD, heart disease and cataracts.     I advised him to quit and he is not willing to quit.    I spent 4 minutes counseling the patient.  Repeat CT chest due for newly developed RUL nodule <1cm, mod to high risk. Scheduled with pulmonology for 12/21/20. CT ordered for repeat.        Assessment/Plan   Medicare Risks and Personalized Health Plan  CMS Preventative Services Quick Reference  Lung Cancer Risk    The above risks/problems have been discussed with the patient.  Pertinent information has been shared with the patient in the After Visit Summary.  Follow up plans and orders are seen below in the Assessment/Plan Section.    Diagnoses and all orders for this visit:    1. Medicare annual wellness visit, subsequent (Primary)    2. Chronic midline low back pain without  sciatica  Comments:  controlled to stated satisfaction today. able to garden and work as needed, and sleep without waking due to pain.     3. Osteoarthritis of spine with radiculopathy, lumbar region    4. Need for influenza vaccination  -     Fluad Quad 65+ yrs ([id number])    5. Right upper lobe pulmonary nodule  -     CT Chest With Contrast; Future    Other orders  -     Cancel: HYDROcodone-acetaminophen (Lortab) 7.5-325 MG per tablet; Take 1 tablet by mouth Every 6 (Six) Hours As Needed for Moderate Pain . Fill when due  Dispense: 120 tablet; Refill: 0  -     Cancel: HYDROcodone-acetaminophen (Lortab) 7.5-325 MG per tablet; Take 1 tablet by mouth Every 6 (Six) Hours As Needed for Moderate Pain . Fill when due  Dispense: 120 tablet; Refill: 0    stable chronic lower back pain, no refill hydrocodone due at present.    RUL pulm nodule, suspicious noted 7/20/20 LDCT chest, due for repeat chest CT, PET not approved. CT ordered today.   Medicare wellness visits performed  Follow Up:  Return in about 12 weeks (around 1/14/2021) for 1 year for Medicare Wellness visit..     An After Visit Summary and PPPS were given to the patient.

## 2020-10-22 NOTE — PATIENT INSTRUCTIONS
Advance Directive    Advance directives are legal documents that let you make choices ahead of time about your health care and medical treatment in case you become unable to communicate for yourself. Advance directives are a way for you to make known your wishes to family, friends, and health care providers. This can let others know about your end-of-life care if you become unable to communicate.  Discussing and writing advance directives should happen over time rather than all at once. Advance directives can be changed depending on your situation and what you want, even after you have signed the advance directives.  There are different types of advance directives, such as:  · Medical power of .  · Living will.  · Do not resuscitate (DNR) or do not attempt resuscitation (DNAR) order.  Health care proxy and medical power of   A health care proxy is also called a health care agent. This is a person who is appointed to make medical decisions for you in cases where you are unable to make the decisions yourself. Generally, people choose someone they know well and trust to represent their preferences. Make sure to ask this person for an agreement to act as your proxy. A proxy may have to exercise judgment in the event of a medical decision for which your wishes are not known.  A medical power of  is a legal document that names your health care proxy. Depending on the laws in your state, after the document is written, it may also need to be:  · Signed.  · Notarized.  · Dated.  · Copied.  · Witnessed.  · Incorporated into your medical record.  You may also want to appoint someone to manage your money in a situation in which you are unable to do so. This is called a durable power of  for finances. It is a separate legal document from the durable power of  for health care. You may choose the same person or someone different from your health care proxy to act as your agent in money  matters.  If you do not appoint a proxy, or if there is a concern that the proxy is not acting in your best interests, a court may appoint a guardian to act on your behalf.  Living will  A living will is a set of instructions that state your wishes about medical care when you cannot express them yourself. Health care providers should keep a copy of your living will in your medical record. You may want to give a copy to family members or friends. To alert caregivers in case of an emergency, you can place a card in your wallet to let them know that you have a living will and where they can find it. A living will is used if you become:  · Terminally ill.  · Disabled.  · Unable to communicate or make decisions.  Items to consider in your living will include:  · To use or not to use life-support equipment, such as dialysis machines and breathing machines (ventilators).  · A DNR or DNAR order. This tells health care providers not to use cardiopulmonary resuscitation (CPR) if breathing or heartbeat stops.  · To use or not to use tube feeding.  · To be given or not to be given food and fluids.  · Comfort (palliative) care when the goal becomes comfort rather than a cure.  · Donation of organs and tissues.  A living will does not give instructions for distributing your money and property if you should pass away.  DNR or DNAR  A DNR or DNAR order is a request not to have CPR in the event that your heart stops beating or you stop breathing. If a DNR or DNAR order has not been made and shared, a health care provider will try to help any patient whose heart has stopped or who has stopped breathing. If you plan to have surgery, talk with your health care provider about how your DNR or DNAR order will be followed if problems occur.  What if I do not have an advance directive?  If you do not have an advance directive, some states assign family decision makers to act on your behalf based on how closely you are related to them. Each  state has its own laws about advance directives. You may want to check with your health care provider, , or state representative about the laws in your state.  Summary  · Advance directives are the legal documents that allow you to make choices ahead of time about your health care and medical treatment in case you become unable to tell others about your care.  · The process of discussing and writing advance directives should happen over time. You can change the advance directives, even after you have signed them.  · Advance directives include DNR or DNAR orders, living heredia, and designating an agent as your medical power of .  This information is not intended to replace advice given to you by your health care provider. Make sure you discuss any questions you have with your health care provider.  Document Released: 03/26/2009 Document Revised: 07/16/2020 Document Reviewed: 07/16/2020  [employer] Patient Education © 2020 [employer]    Pulmonary Nodule  A pulmonary nodule is tissue that has grown on your lung. A nodule may be cancer, but most nodules are not cancer.  Follow these instructions at home:    · Take over-the-counter and prescription medicines only as told by your doctor.  · Do not use any products that have nicotine or tobacco, such as cigarettes and e-cigarettes. If you need help quitting, ask your doctor.  · Keep all follow-up visits as told by your doctor. This is important.  Contact a doctor if:  · You have trouble breathing when doing activities.  · You feel sick.  · You feel more tired than normal.  · You do not feel like eating.  · You lose weight without trying.  · You have chills.  · You have night sweats.  Get help right away if:  · You cannot catch your breath.  · You start making whistling sounds when breathing (wheezing).  · You cannot stop coughing.  · You cough up blood.  · You get dizzy.  · You feel like you are going to pass out (faint).  · You have sudden chest  pain.  · You have a fever or symptoms for more than 2-3 days.  · You have a fever and your symptoms suddenly get worse.  Summary  · A pulmonary nodule is tissue that has grown on your lung.  · Most nodules are not cancer.  · Your doctor will do tests to know what kind of nodule you have, and whether you need treatment for it.  This information is not intended to replace advice given to you by your health care provider. Make sure you discuss any questions you have with your health care provider.  Document Released: 01/20/2012 Document Revised: 01/11/2019 Document Reviewed: 01/16/2018  [employer] Patient Education © 2020 [employer]    Preventing Lung Cancer    Lung cancer is one of the most common cancers among both men and women. It occurs when abnormal cells in the lung grow out of control and form a mass (tumor). Although treatments are available, lung cancer is a dangerous form of cancer, especially if it is not caught early. Lung cancer is the leading cause of cancer death.  Smoking tobacco or nicotine products is the cause of most cases of lung cancer. This type of cancer can also result from exposure to certain substances, such as radon or asbestos. By making some lifestyle changes and taking some precautions, you can reduce your risk of getting lung cancer.  What can I do to lower my risk?  · Do not smoke any tobacco or nicotine products, such as cigarettes, cigars, pipes, and e-cigarettes. If you need help quitting, ask your health care provider.  · Avoid secondhand smoke in the home, car, and workplace.  · Get your home tested for radon. Radon is an odorless gas that can seep into your home through cracks in the foundation, walls, or floor. If you have an unsafe level of radon in your home, hire a professional to lower the radon level.  · Have your home’s drinking water tested for arsenic.  · If you must be in workplaces where you might be near harmful substances, such as asbestos, use proper safety  precautions to protect your lungs.  · If you smoke, do not take beta-carotene supplements. Your risk of lung cancer may increase further if you take these supplements in combination with smoking.  · Eat a healthy diet that includes at least 5 servings of fruits and vegetables every day.  Why are these changes important?  · These changes and precautions address the factors that are known to increase the risk of lung cancer. Taking these steps is the best way to reduce your risk.  · In addition to making you less likely to get lung cancer, these changes will also provide other health benefits, as described here:  ? Avoiding tobacco and nicotine can reduce your risk for other types of cancer and other health problems.  ? Avoiding exposure to poisonous (toxic) chemicals--such as radon, arsenic, and asbestos--can help limit damage to tissues of the lungs and other parts of the body.  ? Eating a healthy diet is good for your overall health.    What can happen if changes are not made?  If you do not take steps to reduce your risk, you may be more likely to develop lung cancer. Tobacco smoke and other harmful substances can take years to damage the cells in your lungs and cause cancer. You may be getting sick without knowing it.  Where to find support  · If you need more help to quit smoking or using tobacco, visit these websites:  ? SmokeFree.gov: https://smokefree.gov/  ? U.S. Department of Health and Human Services: http://betobaccofree.hhs.gov/quit-now/  ? Centers for Disease Control and Prevention: www.cdc.gov/tobacco/quit_smoking/  · To get the level of radon in your home tested, you may buy an at-home test kit at a [employer] store or home improvement store, or you can hire a professional service. If you have an unsafe level of radon in your home, hire a professional to lower the radon level.  · Some risk factors for lung cancer are passed down through families. Talk with your health care provider or genetic counselor  to learn more about genetic testing for cancer.  Where to find more information  Learn more about lung cancer from:  · The National Cancer [place]: www.cancer.gov/types/lung  · The Centers for Disease Control and Prevention: www.cdc.gov/cancer/lung/  · [facility]: https://[facility].gov/lungcancer/  · The American Lung Association: www.cancer.org/cancer/lungcancer/  Summary  · Lung cancer is one of the most common and dangerous forms of cancer.  · Smoking tobacco products is the cause of most cases of lung cancer.  · Lifestyle changes can be made to reduce the risk of getting lung cancer. These include quitting smoking, testing for and removing radon in the home, and protecting the lungs at work.  This information is not intended to replace advice given to you by your health care provider. Make sure you discuss any questions you have with your health care provider.  Document Released: 01/01/2017 Document Revised: 12/21/2018 Document Reviewed: 08/26/2017  [employer] Patient Education © 2020 [employer]

## 2020-11-09 DIAGNOSIS — M54.50 CHRONIC MIDLINE LOW BACK PAIN WITHOUT SCIATICA: Chronic | ICD-10-CM

## 2020-11-09 DIAGNOSIS — Z79.899 ENCOUNTER FOR LONG-TERM CURRENT USE OF MEDICATION: ICD-10-CM

## 2020-11-09 DIAGNOSIS — E78.2 MIXED HYPERLIPIDEMIA: ICD-10-CM

## 2020-11-09 DIAGNOSIS — G89.29 CHRONIC MIDLINE LOW BACK PAIN WITHOUT SCIATICA: Chronic | ICD-10-CM

## 2020-11-09 DIAGNOSIS — M47.26 OSTEOARTHRITIS OF SPINE WITH RADICULOPATHY, LUMBAR REGION: Chronic | ICD-10-CM

## 2020-11-10 ENCOUNTER — TELEPHONE (OUTPATIENT)
Dept: FAMILY MEDICINE CLINIC | Facility: CLINIC | Age: 65
End: 2020-11-10

## 2020-11-10 RX ORDER — HYDROCODONE BITARTRATE AND ACETAMINOPHEN 7.5; 325 MG/1; MG/1
1 TABLET ORAL EVERY 6 HOURS PRN
Qty: 120 TABLET | Refills: 0 | Status: SHIPPED | OUTPATIENT
Start: 2020-11-10 | End: 2020-12-07 | Stop reason: SDUPTHER

## 2020-11-10 NOTE — TELEPHONE ENCOUNTER
Patient seen in office every 3 months for chronic pain requiring opiate pain medication. Compliant with medication, visits with no adverse effects noted. TERRY and UDS current and appropriate. Patient called requesting scheduled refill at appropriate interval. Patient understands the risks associated with this controlled medication, including tolerance and addiction.  Patient also agrees to only obtain this medication from me, and not from a another provider, unless that provider is covering for me in my absence.  Patient also agrees to be compliant in dosing, and not self adjust the dose of medication.  A signed controlled substance agreement is on file, and the patient has received a controlled substance education sheet at this a previous visit.  The patient has also signed a consent for treatment with a controlled substance as per [facility] policy. TERRY was obtained.   Refill sent for hydrocodone.     This document has been electronically signed by JASMINE Noel on November 10, 2020 10:37 CST

## 2020-11-16 DIAGNOSIS — J30.89 CHRONIC NONSEASONAL ALLERGIC RHINITIS DUE TO POLLEN: ICD-10-CM

## 2020-11-16 RX ORDER — MONTELUKAST SODIUM 10 MG/1
10 TABLET ORAL NIGHTLY
Qty: 30 TABLET | Refills: 5 | Status: SHIPPED | OUTPATIENT
Start: 2020-11-16 | End: 2021-05-17 | Stop reason: SDUPTHER

## 2020-12-02 DIAGNOSIS — I10 ESSENTIAL HYPERTENSION: ICD-10-CM

## 2020-12-02 DIAGNOSIS — M62.838 MUSCLE SPASM: ICD-10-CM

## 2020-12-02 RX ORDER — AMLODIPINE BESYLATE 10 MG/1
TABLET ORAL
Qty: 90 TABLET | Refills: 1 | Status: SHIPPED | OUTPATIENT
Start: 2020-12-02 | End: 2021-05-17 | Stop reason: SDUPTHER

## 2020-12-02 RX ORDER — TIZANIDINE 2 MG/1
2 TABLET ORAL EVERY 8 HOURS PRN
Qty: 30 TABLET | Refills: 5 | Status: SHIPPED | OUTPATIENT
Start: 2020-12-02 | End: 2021-05-17 | Stop reason: SDUPTHER

## 2020-12-07 DIAGNOSIS — G89.29 CHRONIC MIDLINE LOW BACK PAIN WITHOUT SCIATICA: Chronic | ICD-10-CM

## 2020-12-07 DIAGNOSIS — M47.26 OSTEOARTHRITIS OF SPINE WITH RADICULOPATHY, LUMBAR REGION: Chronic | ICD-10-CM

## 2020-12-07 DIAGNOSIS — M54.50 CHRONIC MIDLINE LOW BACK PAIN WITHOUT SCIATICA: Chronic | ICD-10-CM

## 2020-12-07 DIAGNOSIS — Z79.899 ENCOUNTER FOR LONG-TERM CURRENT USE OF MEDICATION: ICD-10-CM

## 2020-12-07 DIAGNOSIS — E78.2 MIXED HYPERLIPIDEMIA: ICD-10-CM

## 2020-12-08 ENCOUNTER — TELEPHONE (OUTPATIENT)
Dept: FAMILY MEDICINE CLINIC | Facility: CLINIC | Age: 65
End: 2020-12-08

## 2020-12-08 RX ORDER — HYDROCODONE BITARTRATE AND ACETAMINOPHEN 7.5; 325 MG/1; MG/1
1 TABLET ORAL EVERY 6 HOURS PRN
Qty: 120 TABLET | Refills: 0 | Status: SHIPPED | OUTPATIENT
Start: 2020-12-08 | End: 2021-01-07 | Stop reason: SDUPTHER

## 2020-12-09 NOTE — TELEPHONE ENCOUNTER
Patient seen in office every 3 months for chronic pain requiring opiate pain medication. Compliant with medication, visits with no adverse effects noted. TERRY and UDS current and appropriate. Patient called requesting scheduled refill at appropriate interval. Patient understands the risks associated with this controlled medication, including tolerance and addiction.  Patient also agrees to only obtain this medication from me, and not from a another provider, unless that provider is covering for me in my absence.  Patient also agrees to be compliant in dosing, and not self adjust the dose of medication.  A signed controlled substance agreement is on file, and the patient has received a controlled substance education sheet at this a previous visit.  The patient has also signed a consent for treatment with a controlled substance as per [facility] policy. TERRY was obtained.   Refill sent for hydrocodone.      This document has been electronically signed by JASMINE Noel on December 8, 2020 18:04 CST

## 2021-01-07 DIAGNOSIS — G89.29 CHRONIC MIDLINE LOW BACK PAIN WITHOUT SCIATICA: Chronic | ICD-10-CM

## 2021-01-07 DIAGNOSIS — M54.50 CHRONIC MIDLINE LOW BACK PAIN WITHOUT SCIATICA: Chronic | ICD-10-CM

## 2021-01-07 DIAGNOSIS — E78.2 MIXED HYPERLIPIDEMIA: ICD-10-CM

## 2021-01-07 DIAGNOSIS — M47.26 OSTEOARTHRITIS OF SPINE WITH RADICULOPATHY, LUMBAR REGION: Chronic | ICD-10-CM

## 2021-01-07 DIAGNOSIS — Z79.899 ENCOUNTER FOR LONG-TERM CURRENT USE OF MEDICATION: ICD-10-CM

## 2021-01-11 ENCOUNTER — TELEPHONE (OUTPATIENT)
Dept: FAMILY MEDICINE CLINIC | Facility: CLINIC | Age: 66
End: 2021-01-11

## 2021-01-11 RX ORDER — HYDROCODONE BITARTRATE AND ACETAMINOPHEN 7.5; 325 MG/1; MG/1
1 TABLET ORAL EVERY 6 HOURS PRN
Qty: 120 TABLET | Refills: 0 | Status: SHIPPED | OUTPATIENT
Start: 2021-01-11 | End: 2021-02-08 | Stop reason: SDUPTHER

## 2021-01-11 NOTE — TELEPHONE ENCOUNTER
Patient seen in office every 3 months for chronic pain requiring opiate pain medication. Compliant with medication, visits with no adverse effects noted. TERRY and UDS current and appropriate. Patient called requesting scheduled refill at appropriate interval. Patient understands the risks associated with this controlled medication, including tolerance and addiction.  Patient also agrees to only obtain this medication from me, and not from a another provider, unless that provider is covering for me in my absence.  Patient also agrees to be compliant in dosing, and not self adjust the dose of medication.  A signed controlled substance agreement is on file, and the patient has received a controlled substance education sheet at this a previous visit.  The patient has also signed a consent for treatment with a controlled substance as per [facility] policy. TERRY was obtained.   Refill sent for hydrocodone.     This document has been electronically signed by JASMINE Noel on January 11, 2021 11:52 CST

## 2021-01-12 DIAGNOSIS — E78.2 MIXED HYPERLIPIDEMIA: Primary | ICD-10-CM

## 2021-01-12 DIAGNOSIS — Z79.891 LONG TERM (CURRENT) USE OF OPIATE ANALGESIC: ICD-10-CM

## 2021-01-12 DIAGNOSIS — Z13.29 SCREENING FOR THYROID DISORDER: ICD-10-CM

## 2021-01-12 DIAGNOSIS — I10 ESSENTIAL HYPERTENSION: ICD-10-CM

## 2021-01-14 ENCOUNTER — OFFICE VISIT (OUTPATIENT)
Dept: FAMILY MEDICINE CLINIC | Facility: CLINIC | Age: 66
End: 2021-01-14

## 2021-01-14 DIAGNOSIS — G89.29 CHRONIC MIDLINE LOW BACK PAIN WITHOUT SCIATICA: Primary | Chronic | ICD-10-CM

## 2021-01-14 DIAGNOSIS — M54.50 CHRONIC MIDLINE LOW BACK PAIN WITHOUT SCIATICA: Primary | Chronic | ICD-10-CM

## 2021-01-14 DIAGNOSIS — M47.26 OSTEOARTHRITIS OF SPINE WITH RADICULOPATHY, LUMBAR REGION: Chronic | ICD-10-CM

## 2021-01-14 PROCEDURE — 99442 PR PHYS/QHP TELEPHONE EVALUATION 11-20 MIN: CPT | Performed by: NURSE PRACTITIONER

## 2021-01-14 NOTE — PROGRESS NOTES
Subjective   Derrell Bynum is a 65 y.o. male.   You have chosen to receive care through a telephone visit. Do you consent to use a telephone visit for your medical care today? Yes  15 minutes medical discussion.     Chief Complaint   Patient presents with   • Pain     12 week f/u        History of Present Illness     Chronic lower back pain, stable. Pain R/T OA changes of spine with DDD lumbar spine. No sciatica. Reports pain well controlled with current medication. Managed with hydrocodone, not due for refill today, last refill being on 1/11/21 by PDMP. Compliant with use and denies ADEs.     No new complaints today.     Due for routine labs to evaluate chronic conditions and effects of prescribed medications, previously entered, will have collected in next 7 days.     Parts of most recent relevant visit HPI, ROS  and PE may be carried forward and all are updated as appropriate for current situation.      Past Surgical History:   Procedure Laterality Date   • HERNIA REPAIR      Hernia repair w/mesh (1)      • INJECTION OF MEDICATION  03/26/2014    Depo Medrol (Methylprednisone) 80mg (1)      • INJECTION OF MEDICATION  03/26/2014    Dexamethasone (1)      • INJECTION OF MEDICATION  06/04/2013    Kenalog (1)    • INJECTION OF MEDICATION  03/26/2014    Rocephin (1)      • OTHER SURGICAL HISTORY  03/26/2014    Nebulizer Treatment 35385 (1)         Social History     Socioeconomic History   • Marital status:      Spouse name: Not on file   • Number of children: Not on file   • Years of education: Not on file   • Highest education level: Not on file   Tobacco Use   • Smoking status: Current Every Day Smoker     Packs/day: 1.00     Years: 35.00     Pack years: 35.00     Types: Cigarettes   • Smokeless tobacco: Never Used   • Tobacco comment: 20-39 cigarettes/day (5/9/16)   Substance and Sexual Activity   • Alcohol use: Yes     Comment: Beer - 8 drinks a day. A  typical drinking day (5/9/16)   • Drug use: Yes      Types: Hydrocodone   • Sexual activity: Yes     Partners: Female      The following portions of the patient's history were reviewed and updated as appropriate: He  has a past medical history of Acute bacterial sinusitis, Acute pharyngitis, Acute sinusitis, Allergic rhinitis, Chronic obstructive pulmonary disease with acute lower respiratory infection (CMS/HCC), Chronic sinusitis, Coalworker's pneumoconiosis (CMS/HCC), Contact dermatitis, COPD (chronic obstructive pulmonary disease) (CMS/HCC), Cough, Dyspnea, Encounter for immunization, Erythrocytosis, Essential hypertension, Fever, Hemoptysis, Hoarse, Hyperlipidemia, Insect bite, nonvenomous of shoulder and upper arm, infected, Malaise and fatigue, Need for immunization against influenza, Osteoarthritis, Smokes tobacco daily, Tobacco dependence syndrome, Viral gastroenteritis (10/10/2013), and Wheezing..    Review of Systems   Constitutional: Negative.  Negative for activity change, appetite change, chills, fever and unexpected weight loss.   HENT: Negative.  Negative for ear pain, postnasal drip, rhinorrhea, sinus pressure, sneezing, sore throat, swollen glands, trouble swallowing and voice change.    Eyes: Negative.  Negative for discharge, redness, itching and visual disturbance.   Respiratory: Negative.  Negative for cough, shortness of breath, wheezing and stridor.    Cardiovascular: Negative.  Negative for chest pain, palpitations and leg swelling.   Gastrointestinal: Negative.    Endocrine: Negative.  Negative for polydipsia, polyphagia and polyuria.   Genitourinary: Negative.  Negative for dysuria.   Musculoskeletal: Positive for back pain. Negative for arthralgias.   Skin: Negative.    Allergic/Immunologic: Negative.    Neurological: Negative.    Hematological: Negative.    Psychiatric/Behavioral: Negative.  Negative for behavioral problems, dysphoric mood, sleep disturbance, suicidal ideas and depressed mood. The patient is not nervous/anxious.    All  other systems reviewed and are negative.    PHQ-9 Depression Screening  Little interest or pleasure in doing things? 0   Feeling down, depressed, or hopeless? 0   Trouble falling or staying asleep, or sleeping too much?     Feeling tired or having little energy?     Poor appetite or overeating?     Feeling bad about yourself - or that you are a failure or have let yourself or your family down?     Trouble concentrating on things, such as reading the newspaper or watching television?     Moving or speaking so slowly that other people could have noticed? Or the opposite - being so fidgety or restless that you have been moving around a lot more than usual?     Thoughts that you would be better off dead, or of hurting yourself in some way?     PHQ-9 Total Score 0   If you checked off any problems, how difficult have these problems made it for you to do your work, take care of things at home, or get along with other people?      Patient understands the risks associated with this controlled medication, including tolerance and addiction.  Patient also agrees to only obtain this medication from me, and not from a another provider, unless that provider is covering for me in my absence.  Patient also agrees to be compliant in dosing, and not self adjust the dose of medication.  A signed controlled substance agreement is on file, and the patient has received a controlled substance education sheet at this a previous visit.  The patient has also signed a consent for treatment with a controlled substance as per [facility] policy. TERRY was obtained.    Objective   Physical Exam  Vitals signs and nursing note reviewed.   Constitutional:       General: He is not in acute distress.  Pulmonary:      Effort: Pulmonary effort is normal. No respiratory distress.      Breath sounds: No stridor.   Neurological:      Mental Status: He is alert and oriented to person, place, and time.   Psychiatric:         Mood and Affect: Mood  normal.         Behavior: Behavior normal.         Thought Content: Thought content normal.         Judgment: Judgment normal.       Stable chronic lower back pain, due for labs, previously entered, will have collected in next 7 days. No refill hydrocodone due today.      Assessment/Plan   Diagnoses and all orders for this visit:    1. Chronic midline low back pain without sciatica (Primary)  Comments:  controlled to stated satisfaction today. able to garden and work as needed, and sleep without waking due to pain.     2. Osteoarthritis of spine with radiculopathy, lumbar region      Return in about 12 weeks (around 4/8/2021), or if symptoms worsen or fail to improve.               This document has been electronically signed by JASMINE Noel on January 14, 2021 09:35 CST

## 2021-01-27 ENCOUNTER — LAB (OUTPATIENT)
Dept: LAB | Facility: OTHER | Age: 66
End: 2021-01-27

## 2021-01-27 DIAGNOSIS — E78.2 MIXED HYPERLIPIDEMIA: ICD-10-CM

## 2021-01-27 DIAGNOSIS — I10 ESSENTIAL HYPERTENSION: ICD-10-CM

## 2021-01-27 DIAGNOSIS — Z13.29 SCREENING FOR THYROID DISORDER: ICD-10-CM

## 2021-01-27 DIAGNOSIS — Z79.891 LONG TERM (CURRENT) USE OF OPIATE ANALGESIC: ICD-10-CM

## 2021-01-27 LAB
BASOPHILS # BLD AUTO: 0.05 10*3/MM3 (ref 0–0.2)
BASOPHILS NFR BLD AUTO: 0.6 % (ref 0–1.5)
DEPRECATED RDW RBC AUTO: 47 FL (ref 37–54)
EOSINOPHIL # BLD AUTO: 0.19 10*3/MM3 (ref 0–0.4)
EOSINOPHIL NFR BLD AUTO: 2.1 % (ref 0.3–6.2)
ERYTHROCYTE [DISTWIDTH] IN BLOOD BY AUTOMATED COUNT: 13.7 % (ref 12.3–15.4)
HCT VFR BLD AUTO: 51 % (ref 37.5–51)
HGB BLD-MCNC: 17.9 G/DL (ref 13–17.7)
LYMPHOCYTES # BLD AUTO: 1.62 10*3/MM3 (ref 0.7–3.1)
LYMPHOCYTES NFR BLD AUTO: 17.9 % (ref 19.6–45.3)
MCH RBC QN AUTO: 33.3 PG (ref 26.6–33)
MCHC RBC AUTO-ENTMCNC: 35.1 G/DL (ref 31.5–35.7)
MCV RBC AUTO: 94.8 FL (ref 79–97)
MONOCYTES # BLD AUTO: 0.99 10*3/MM3 (ref 0.1–0.9)
MONOCYTES NFR BLD AUTO: 10.9 % (ref 5–12)
NEUTROPHILS NFR BLD AUTO: 6.22 10*3/MM3 (ref 1.7–7)
NEUTROPHILS NFR BLD AUTO: 68.5 % (ref 42.7–76)
PLATELET # BLD AUTO: 292 10*3/MM3 (ref 140–450)
PMV BLD AUTO: 9.4 FL (ref 6–12)
RBC # BLD AUTO: 5.38 10*6/MM3 (ref 4.14–5.8)
WBC # BLD AUTO: 9.07 10*3/MM3 (ref 3.4–10.8)

## 2021-01-27 PROCEDURE — G0481 DRUG TEST DEF 8-14 CLASSES: HCPCS | Performed by: NURSE PRACTITIONER

## 2021-01-27 PROCEDURE — 80307 DRUG TEST PRSMV CHEM ANLYZR: CPT | Performed by: NURSE PRACTITIONER

## 2021-01-27 PROCEDURE — 85025 COMPLETE CBC W/AUTO DIFF WBC: CPT | Performed by: NURSE PRACTITIONER

## 2021-01-27 PROCEDURE — 36415 COLL VENOUS BLD VENIPUNCTURE: CPT | Performed by: NURSE PRACTITIONER

## 2021-01-28 ENCOUNTER — LAB (OUTPATIENT)
Dept: LAB | Facility: OTHER | Age: 66
End: 2021-01-28

## 2021-01-28 DIAGNOSIS — E78.2 MIXED HYPERLIPIDEMIA: ICD-10-CM

## 2021-01-28 DIAGNOSIS — Z13.29 SCREENING FOR THYROID DISORDER: ICD-10-CM

## 2021-01-28 DIAGNOSIS — I10 ESSENTIAL HYPERTENSION: ICD-10-CM

## 2021-01-28 LAB
ALBUMIN SERPL-MCNC: 4.6 G/DL (ref 3.5–5)
ALBUMIN/GLOB SERPL: 1.3 G/DL (ref 1.1–1.8)
ALP SERPL-CCNC: 73 U/L (ref 38–126)
ALT SERPL W P-5'-P-CCNC: 29 U/L
ANION GAP SERPL CALCULATED.3IONS-SCNC: 9 MMOL/L (ref 5–15)
AST SERPL-CCNC: 36 U/L (ref 17–59)
BILIRUB SERPL-MCNC: 1 MG/DL (ref 0.2–1.3)
BUN SERPL-MCNC: 7 MG/DL (ref 7–23)
BUN/CREAT SERPL: 7.8 (ref 7–25)
CALCIUM SPEC-SCNC: 10.3 MG/DL (ref 8.4–10.2)
CHLORIDE SERPL-SCNC: 94 MMOL/L (ref 101–112)
CHOLEST SERPL-MCNC: 216 MG/DL (ref 150–200)
CO2 SERPL-SCNC: 32 MMOL/L (ref 22–30)
CREAT SERPL-MCNC: 0.9 MG/DL (ref 0.7–1.3)
GFR SERPL CREATININE-BSD FRML MDRD: 85 ML/MIN/1.73 (ref 49–113)
GLOBULIN UR ELPH-MCNC: 3.5 GM/DL (ref 2.3–3.5)
GLUCOSE SERPL-MCNC: 130 MG/DL (ref 70–99)
HDLC SERPL-MCNC: 129 MG/DL (ref 40–59)
LDLC SERPL CALC-MCNC: 76 MG/DL
LDLC/HDLC SERPL: 0.58 {RATIO} (ref 0–3.55)
POTASSIUM SERPL-SCNC: 4.4 MMOL/L (ref 3.4–5)
PROT SERPL-MCNC: 8.1 G/DL (ref 6.3–8.6)
SODIUM SERPL-SCNC: 135 MMOL/L (ref 137–145)
T3FREE SERPL-MCNC: 3.82 PG/ML (ref 2–4.4)
T4 FREE SERPL-MCNC: 1.59 NG/DL (ref 0.93–1.7)
TRIGL SERPL-MCNC: 59 MG/DL
TSH SERPL DL<=0.05 MIU/L-ACNC: 0.7 UIU/ML (ref 0.27–4.2)
VLDLC SERPL-MCNC: 11 MG/DL (ref 5–40)

## 2021-01-28 PROCEDURE — 80061 LIPID PANEL: CPT | Performed by: NURSE PRACTITIONER

## 2021-01-28 PROCEDURE — 36415 COLL VENOUS BLD VENIPUNCTURE: CPT | Performed by: NURSE PRACTITIONER

## 2021-01-28 PROCEDURE — 84439 ASSAY OF FREE THYROXINE: CPT | Performed by: NURSE PRACTITIONER

## 2021-01-28 PROCEDURE — 84481 FREE ASSAY (FT-3): CPT | Performed by: NURSE PRACTITIONER

## 2021-01-28 PROCEDURE — 84443 ASSAY THYROID STIM HORMONE: CPT | Performed by: NURSE PRACTITIONER

## 2021-01-28 PROCEDURE — 80053 COMPREHEN METABOLIC PANEL: CPT | Performed by: NURSE PRACTITIONER

## 2021-01-29 ENCOUNTER — OFFICE VISIT (OUTPATIENT)
Dept: FAMILY MEDICINE CLINIC | Facility: CLINIC | Age: 66
End: 2021-01-29

## 2021-01-29 VITALS
BODY MASS INDEX: 21.59 KG/M2 | OXYGEN SATURATION: 98 % | SYSTOLIC BLOOD PRESSURE: 154 MMHG | HEIGHT: 72 IN | HEART RATE: 85 BPM | WEIGHT: 159.4 LBS | RESPIRATION RATE: 18 BRPM | DIASTOLIC BLOOD PRESSURE: 78 MMHG

## 2021-01-29 DIAGNOSIS — K21.00 GASTROESOPHAGEAL REFLUX DISEASE WITH ESOPHAGITIS WITHOUT HEMORRHAGE: Primary | ICD-10-CM

## 2021-01-29 PROCEDURE — 99213 OFFICE O/P EST LOW 20 MIN: CPT | Performed by: NURSE PRACTITIONER

## 2021-01-29 RX ORDER — SUCRALFATE 1 G/1
1 TABLET ORAL
Qty: 120 TABLET | Refills: 0
Start: 2021-01-29 | End: 2021-02-22 | Stop reason: SDDI

## 2021-01-29 RX ORDER — PANTOPRAZOLE SODIUM 40 MG/1
40 TABLET, DELAYED RELEASE ORAL DAILY
Qty: 30 TABLET | Refills: 5
Start: 2021-01-29 | End: 2021-06-22

## 2021-01-29 NOTE — PROGRESS NOTES
Subjective   Derrell Bynum is a 66 y.o. male.       Chief Complaint   Patient presents with   • Heartburn     the last 6 or 7 days        History of Present Illness     Patient has experienced significant burning pain midsternal with meals x past 6 days. Relieved with 4 tums after about 20 minutes. Is always associated with meals. He tried Pepto-Bismol, without relief. He has never had much acid reflux/ heartburn.    No other complaints today.   Parts of most recent relevant visit HPI, ROS  and PE may be carried forward and all are updated as appropriate for current situation.    Past Surgical History:   Procedure Laterality Date   • HERNIA REPAIR      Hernia repair w/mesh (1)      • INJECTION OF MEDICATION  03/26/2014    Depo Medrol (Methylprednisone) 80mg (1)      • INJECTION OF MEDICATION  03/26/2014    Dexamethasone (1)      • INJECTION OF MEDICATION  06/04/2013    Kenalog (1)    • INJECTION OF MEDICATION  03/26/2014    Rocephin (1)      • OTHER SURGICAL HISTORY  03/26/2014    Nebulizer Treatment 36732 (1)         Social History     Socioeconomic History   • Marital status:      Spouse name: Not on file   • Number of children: Not on file   • Years of education: Not on file   • Highest education level: Not on file   Tobacco Use   • Smoking status: Current Every Day Smoker     Packs/day: 1.00     Years: 35.00     Pack years: 35.00     Types: Cigarettes   • Smokeless tobacco: Never Used   • Tobacco comment: 20-39 cigarettes/day (5/9/16)   Substance and Sexual Activity   • Alcohol use: Yes     Comment: Beer - 8 drinks a day. A  typical drinking day (5/9/16)   • Drug use: Yes     Types: Hydrocodone   • Sexual activity: Yes     Partners: Female      The following portions of the patient's history were reviewed and updated as appropriate: He  has a past medical history of Acute bacterial sinusitis, Acute pharyngitis, Acute sinusitis, Allergic rhinitis, Chronic obstructive pulmonary disease with acute lower  respiratory infection (CMS/[facility]), Chronic sinusitis, Coalworker's pneumoconiosis (CMS/HCC), Contact dermatitis, COPD (chronic obstructive pulmonary disease) (CMS/[facility]), Cough, Dyspnea, Encounter for immunization, Erythrocytosis, Essential hypertension, Fever, Hemoptysis, Hoarse, Hyperlipidemia, Insect bite, nonvenomous of shoulder and upper arm, infected, Malaise and fatigue, Need for immunization against influenza, Osteoarthritis, Smokes tobacco daily, Tobacco dependence syndrome, Viral gastroenteritis (10/10/2013), and Wheezing..    Review of Systems   Constitutional: Negative.  Negative for activity change, appetite change, chills, fever and unexpected weight loss.   HENT: Negative.  Negative for ear pain, postnasal drip, rhinorrhea, sinus pressure, sneezing, sore throat, swollen glands, trouble swallowing and voice change.    Eyes: Negative.  Negative for discharge, redness, itching and visual disturbance.   Respiratory: Negative.  Negative for cough, shortness of breath, wheezing and stridor.    Cardiovascular: Negative.  Negative for chest pain, palpitations and leg swelling.   Gastrointestinal: Negative.  Positive for GERD and indigestion.   Endocrine: Negative.  Negative for polydipsia, polyphagia and polyuria.   Genitourinary: Negative.  Negative for dysuria.   Musculoskeletal: Positive for back pain. Negative for arthralgias.   Skin: Negative.    Allergic/Immunologic: Negative.    Neurological: Negative.    Hematological: Negative.    Psychiatric/Behavioral: Negative.  Negative for behavioral problems, dysphoric mood, sleep disturbance, suicidal ideas and depressed mood. The patient is not nervous/anxious.    All other systems reviewed and are negative.    PHQ-9 Depression Screening  Little interest or pleasure in doing things? 0   Feeling down, depressed, or hopeless? 0   Trouble falling or staying asleep, or sleeping too much? 0   Feeling tired or having little energy? 0   Poor appetite or overeating? 0      Feeling bad about yourself - or that you are a failure or have let yourself or your family down? 0   Trouble concentrating on things, such as reading the newspaper or watching television? 0   Moving or speaking so slowly that other people could have noticed? Or the opposite - being so fidgety or restless that you have been moving around a lot more than usual? 0   Thoughts that you would be better off dead, or of hurting yourself in some way? 0   PHQ-9 Total Score 0   If you checked off any problems, how difficult have these problems made it for you to do your work, take care of things at home, or get along with other people?        Objective   Physical Exam  Vitals signs and nursing note reviewed.   Constitutional:       General: He is not in acute distress.     Appearance: He is well-developed. He is not diaphoretic.   HENT:      Head: Normocephalic and atraumatic.      Nose: Nose normal.   Eyes:      General: No scleral icterus.        Right eye: No discharge.         Left eye: No discharge.      Conjunctiva/sclera: Conjunctivae normal.      Pupils: Pupils are equal, round, and reactive to light.   Neck:      Musculoskeletal: Normal range of motion and neck supple.      Thyroid: No thyromegaly.      Vascular: No JVD.      Trachea: No tracheal deviation.   Cardiovascular:      Rate and Rhythm: Normal rate and regular rhythm.      Pulses: Normal pulses.      Heart sounds: Normal heart sounds. No murmur. No friction rub. No gallop.    Pulmonary:      Effort: Pulmonary effort is normal. No respiratory distress.      Breath sounds: Normal breath sounds. No stridor. No wheezing, rhonchi or rales.   Chest:      Chest wall: No tenderness.   Abdominal:      General: Bowel sounds are normal. There is no distension.      Palpations: Abdomen is soft. There is no mass.      Tenderness: There is no abdominal tenderness. There is no right CVA tenderness, left CVA tenderness, guarding or rebound.      Hernia: No hernia is  present.   Musculoskeletal: Normal range of motion.         General: No tenderness or deformity.   Lymphadenopathy:      Cervical: No cervical adenopathy.   Skin:     General: Skin is warm and dry.      Capillary Refill: Capillary refill takes 2 to 3 seconds.      Coloration: Skin is not jaundiced or pale.      Findings: No bruising, erythema, lesion or rash.   Neurological:      General: No focal deficit present.      Mental Status: He is alert and oriented to person, place, and time. Mental status is at baseline.      Motor: No weakness.      Gait: Gait normal.   Psychiatric:         Mood and Affect: Mood normal.         Behavior: Behavior normal.         Thought Content: Thought content normal.         Judgment: Judgment normal.         Assessment/Plan   Diagnoses and all orders for this visit:    1. Gastroesophageal reflux disease with esophagitis without hemorrhage (Primary)  -     sucralfate (CARAFATE) 1 g tablet; Take 1 tablet by mouth 4 (Four) Times a Day Before Meals & at Bedtime. Dissolve in TBSP water and swallow  Dispense: 120 tablet; Refill: 0  -     pantoprazole (PROTONIX) 40 MG EC tablet; Take 1 tablet by mouth Daily.  Dispense: 30 tablet; Refill: 5        Return in about 2 weeks (around 2/12/2021).             This document has been electronically signed by JASMINE Noel on January 29, 2021 14:23 CST

## 2021-02-01 LAB
6MAM UR QL CFM: NEGATIVE
6MAM/CREAT UR: NOT DETECTED NG/MG CREAT
7AMINOCLONAZEPAM/CREAT UR: NOT DETECTED NG/MG CREAT
A-OH ALPRAZ/CREAT UR: NOT DETECTED NG/MG CREAT
A-OH-TRIAZOLAM/CREAT UR CFM: NOT DETECTED NG/MG CREAT
ALFENTANIL/CREAT UR CFM: NOT DETECTED NG/MG CREAT
ALPHA-HYDROXYMIDAZOLAM, URINE: NOT DETECTED NG/MG CREAT
ALPRAZ/CREAT UR CFM: NOT DETECTED NG/MG CREAT
AMOBARBITAL UR QL CFM: NOT DETECTED
AMPHET/CREAT UR: NOT DETECTED NG/MG CREAT
AMPHETAMINES UR QL CFM: NEGATIVE
BARBITAL UR QL CFM: NOT DETECTED
BARBITURATES UR QL CFM: NEGATIVE
BENZODIAZ UR QL CFM: NEGATIVE
BUPRENORPHINE UR QL CFM: NEGATIVE
BUPRENORPHINE/CREAT UR: NOT DETECTED NG/MG CREAT
BUTABARBITAL UR QL CFM: NOT DETECTED
BUTALBITAL UR QL CFM: NOT DETECTED
BZE/CREAT UR: NOT DETECTED NG/MG CREAT
CANNABINOIDS UR QL CFM: NEGATIVE
CARBOXYTHC/CREAT UR: NOT DETECTED NG/MG CREAT
CLONAZEPAM/CREAT UR CFM: NOT DETECTED NG/MG CREAT
COCAETHYLENE/CREAT UR CFM: NOT DETECTED NG/MG CREAT
COCAINE UR QL CFM: NEGATIVE
COCAINE/CREAT UR CFM: NOT DETECTED NG/MG CREAT
CODEINE/CREAT UR: NOT DETECTED NG/MG CREAT
CREAT UR-MCNC: 69 MG/DL
DESALKYLFLURAZ/CREAT UR: NOT DETECTED NG/MG CREAT
DESMETHYLFLUNITRAZEPAM: NOT DETECTED NG/MG CREAT
DHC/CREAT UR: 174 NG/MG CREAT
DIAZEPAM/CREAT UR: NOT DETECTED NG/MG CREAT
DRUGS UR: NORMAL
EDDP/CREAT UR: NOT DETECTED NG/MG CREAT
ETHANOL UR CFM-MCNC: NOT DETECTED G/DL
ETHANOL UR QL CFM: NEGATIVE
FENTANYL UR QL CFM: NEGATIVE
FENTANYL/CREAT UR: NOT DETECTED NG/MG CREAT
FLUNITRAZEPAM UR QL CFM: NOT DETECTED NG/MG CREAT
HYDROCODONE/CREAT UR: 780 NG/MG CREAT
HYDROMORPHONE/CREAT UR: 593 NG/MG CREAT
LEVEL OF DETECTION:: NORMAL
LORAZEPAM/CREAT UR: NOT DETECTED NG/MG CREAT
MDA/CREAT UR: NOT DETECTED NG/MG CREAT
MDMA/CREAT UR: NOT DETECTED NG/MG CREAT
MEPHOBARBITAL UR QL CFM: NOT DETECTED
METHADONE UR QL CFM: NEGATIVE
METHADONE/CREAT UR: NOT DETECTED NG/MG CREAT
METHAMPHET/CREAT UR: NOT DETECTED NG/MG CREAT
MIDAZOLAM/CREAT UR CFM: NOT DETECTED NG/MG CREAT
MORPHINE/CREAT UR: NOT DETECTED NG/MG CREAT
N-NORTRAMADOL/CREAT UR CFM: NOT DETECTED NG/MG CREAT
NARCOTICS UR: NEGATIVE
NORBUPRENORPHINE/CREAT UR: NOT DETECTED NG/MG CREAT
NORCODEINE/CREAT UR CFM: NOT DETECTED NG/MG CREAT
NORDIAZEPAM/CREAT UR: NOT DETECTED NG/MG CREAT
NORFENTANYL/CREAT UR: NOT DETECTED NG/MG CREAT
NORHYDROCODONE/CREAT UR: 859 NG/MG CREAT
NORMORPHINE UR-MCNC: NOT DETECTED NG/MG CREAT
NOROXYCODONE/CREAT UR: NOT DETECTED NG/MG CREAT
NOROXYMORPHONE/CREAT UR CFM: NOT DETECTED NG/MG CREAT
O-NORTRAMADOL UR CFM-MCNC: NOT DETECTED NG/MG CREAT
OPIATES UR QL CFM: NORMAL
OXAZEPAM/CREAT UR: NOT DETECTED NG/MG CREAT
OXYCODONE UR QL CFM: NEGATIVE
OXYCODONE/CREAT UR: NOT DETECTED NG/MG CREAT
OXYMORPHONE/CREAT UR: NOT DETECTED NG/MG CREAT
PENTOBARB UR QL CFM: NOT DETECTED
PHENOBARB UR QL CFM: NOT DETECTED
SECOBARBITAL UR QL CFM: NOT DETECTED
SUFENTANIL/CREAT UR CFM: NOT DETECTED NG/MG CREAT
TAPENTADOL UR QL CFM: NEGATIVE
TAPENTADOL/CREAT UR: NOT DETECTED NG/MG CREAT
TEMAZEPAM/CREAT UR: NOT DETECTED NG/MG CREAT
THIOPENTAL UR QL CFM: NOT DETECTED
TRAMADOL UR QL CFM: NOT DETECTED NG/MG CREAT

## 2021-02-08 DIAGNOSIS — M47.26 OSTEOARTHRITIS OF SPINE WITH RADICULOPATHY, LUMBAR REGION: Chronic | ICD-10-CM

## 2021-02-08 DIAGNOSIS — E78.2 MIXED HYPERLIPIDEMIA: ICD-10-CM

## 2021-02-08 DIAGNOSIS — G89.29 CHRONIC MIDLINE LOW BACK PAIN WITHOUT SCIATICA: Chronic | ICD-10-CM

## 2021-02-08 DIAGNOSIS — M54.50 CHRONIC MIDLINE LOW BACK PAIN WITHOUT SCIATICA: Chronic | ICD-10-CM

## 2021-02-08 DIAGNOSIS — Z79.899 ENCOUNTER FOR LONG-TERM CURRENT USE OF MEDICATION: ICD-10-CM

## 2021-02-09 ENCOUNTER — TELEPHONE (OUTPATIENT)
Dept: FAMILY MEDICINE CLINIC | Facility: CLINIC | Age: 66
End: 2021-02-09

## 2021-02-09 RX ORDER — HYDROCODONE BITARTRATE AND ACETAMINOPHEN 7.5; 325 MG/1; MG/1
1 TABLET ORAL EVERY 6 HOURS PRN
Qty: 120 TABLET | Refills: 0 | Status: SHIPPED | OUTPATIENT
Start: 2021-02-09 | End: 2021-03-08 | Stop reason: SDUPTHER

## 2021-02-09 NOTE — TELEPHONE ENCOUNTER
Patient seen in office every 3 months for chronic pain requiring opiate pain medication. Compliant with medication, visits with no adverse effects noted. TERRY and UDS current and appropriate. Patient called requesting scheduled refill at appropriate interval. Patient understands the risks associated with this controlled medication, including tolerance and addiction.  Patient also agrees to only obtain this medication from me, and not from a another provider, unless that provider is covering for me in my absence.  Patient also agrees to be compliant in dosing, and not self adjust the dose of medication.  A signed controlled substance agreement is on file, and the patient has received a controlled substance education sheet at this a previous visit.  The patient has also signed a consent for treatment with a controlled substance as per [facility] policy. TERRY was obtained.   Refill sent for hydrocodone.     This document has been electronically signed by JASMINE Noel on February 9, 2021 10:42 CST

## 2021-02-22 ENCOUNTER — LAB (OUTPATIENT)
Dept: LAB | Facility: OTHER | Age: 66
End: 2021-02-22

## 2021-02-22 ENCOUNTER — OFFICE VISIT (OUTPATIENT)
Dept: FAMILY MEDICINE CLINIC | Facility: CLINIC | Age: 66
End: 2021-02-22

## 2021-02-22 VITALS
SYSTOLIC BLOOD PRESSURE: 140 MMHG | WEIGHT: 158 LBS | BODY MASS INDEX: 21.4 KG/M2 | RESPIRATION RATE: 16 BRPM | HEART RATE: 85 BPM | OXYGEN SATURATION: 97 % | HEIGHT: 72 IN | DIASTOLIC BLOOD PRESSURE: 83 MMHG

## 2021-02-22 DIAGNOSIS — K21.00 GASTROESOPHAGEAL REFLUX DISEASE WITH ESOPHAGITIS WITHOUT HEMORRHAGE: ICD-10-CM

## 2021-02-22 DIAGNOSIS — E78.2 MIXED HYPERLIPIDEMIA: ICD-10-CM

## 2021-02-22 DIAGNOSIS — R73.9 HYPERGLYCEMIA: Primary | ICD-10-CM

## 2021-02-22 DIAGNOSIS — I10 ESSENTIAL HYPERTENSION: ICD-10-CM

## 2021-02-22 LAB — HBA1C MFR BLD: 5.6 % (ref 4.8–5.6)

## 2021-02-22 PROCEDURE — 83036 HEMOGLOBIN GLYCOSYLATED A1C: CPT | Performed by: NURSE PRACTITIONER

## 2021-02-22 PROCEDURE — 36415 COLL VENOUS BLD VENIPUNCTURE: CPT | Performed by: NURSE PRACTITIONER

## 2021-02-22 PROCEDURE — 99214 OFFICE O/P EST MOD 30 MIN: CPT | Performed by: NURSE PRACTITIONER

## 2021-02-22 RX ORDER — PRAVASTATIN SODIUM 20 MG
20 TABLET ORAL NIGHTLY
Qty: 30 TABLET | Refills: 5 | Status: SHIPPED | OUTPATIENT
Start: 2021-02-22 | End: 2021-02-22 | Stop reason: DRUGHIGH

## 2021-02-22 RX ORDER — METOPROLOL SUCCINATE 25 MG/1
25 TABLET, EXTENDED RELEASE ORAL DAILY
Qty: 30 TABLET | Refills: 5 | Status: SHIPPED | OUTPATIENT
Start: 2021-02-22 | End: 2021-04-12

## 2021-02-22 RX ORDER — PRAVASTATIN SODIUM 10 MG
10 TABLET ORAL NIGHTLY
Qty: 90 TABLET | Refills: 3 | Status: SHIPPED | OUTPATIENT
Start: 2021-02-22 | End: 2021-10-11 | Stop reason: DRUGHIGH

## 2021-02-22 NOTE — PROGRESS NOTES
Answers for HPI/ROS submitted by the patient on 2/18/2021   What is the primary reason for your visit?: Other  Please describe your symptoms.: Pain in chest after eating  Have you had these symptoms before?: Yes  How long have you been having these symptoms?: Greater than 2 weeks  Subjective   Derrell Bynum is a 66 y.o. male.       Chief Complaint   Patient presents with   • Pain     f/u        History of Present Illness     Patient here for 2 week follow up of prandial chest pain interpreted as gastritis, treated with carafate and pantoprazle. Today he reports: had good results initially with protonix and carafate and once last week the gastric pain hit 5 minutes after taking the carafate then ceased and he was able to eat without problems no recurrence but it scared him off the carafate, which he stopped. The pain he experiences is left mid to lower chest and is always associated with a meal immediately eaten. Never any other time. He continues the protonix and for 4 days has had no epigastric prandial pain at all.  Advised that the medications should have eradicated the pain altogether, and further testing and referral to gastroenterology recommended. Will get a stool h. Pylori  HTN: not at goal on current clonidine and amlodipine today is 140/83 in office with HR 85. Chronic. Requires medication adjustment today.   Hyperglycemia on recent labs, glucose 130, will get a1c today. New onset hyperglycemia requires additional workup.  Hyperlipidemia: had been taking pravastatin 5mg daily since February 2020 when HDL was over 80 and LDL was in the 60's, but LDL has risen in the interim so is to return to 10mg daily. Chronic, worsening, medication adjustment required.     New complaints today: none.    Parts of most recent relevant visit HPI, ROS  and PE may be carried forward and all are updated as appropriate for current situation.    Past Surgical History:   Procedure Laterality Date   • HERNIA REPAIR      Hernia  repair w/mesh (1)      • INJECTION OF MEDICATION  03/26/2014    Depo Medrol (Methylprednisone) 80mg (1)      • INJECTION OF MEDICATION  03/26/2014    Dexamethasone (1)      • INJECTION OF MEDICATION  06/04/2013    Kenalog (1)    • INJECTION OF MEDICATION  03/26/2014    Rocephin (1)      • OTHER SURGICAL HISTORY  03/26/2014    Nebulizer Treatment 77737 (1)         Social History     Socioeconomic History   • Marital status:      Spouse name: Not on file   • Number of children: Not on file   • Years of education: Not on file   • Highest education level: Not on file   Tobacco Use   • Smoking status: Current Every Day Smoker     Packs/day: 1.00     Years: 35.00     Pack years: 35.00     Types: Cigarettes   • Smokeless tobacco: Never Used   • Tobacco comment: 20-39 cigarettes/day (5/9/16)   Substance and Sexual Activity   • Alcohol use: Yes     Comment: Beer - 8 drinks a day. A  typical drinking day (5/9/16)   • Drug use: Yes     Types: Hydrocodone   • Sexual activity: Yes     Partners: Female      The following portions of the patient's history were reviewed and updated as appropriate: He  has a past medical history of Acute bacterial sinusitis, Acute pharyngitis, Acute sinusitis, Allergic rhinitis, Chronic obstructive pulmonary disease with acute lower respiratory infection (CMS/HCC), Chronic sinusitis, Coalworker's pneumoconiosis (CMS/HCC), Contact dermatitis, COPD (chronic obstructive pulmonary disease) (CMS/HCC), Cough, Dyspnea, Encounter for immunization, Erythrocytosis, Essential hypertension, Fever, Hemoptysis, Hoarse, Hyperlipidemia, Insect bite, nonvenomous of shoulder and upper arm, infected, Malaise and fatigue, Need for immunization against influenza, Osteoarthritis, Smokes tobacco daily, Tobacco dependence syndrome, Viral gastroenteritis (10/10/2013), and Wheezing..    Review of Systems   Constitutional: Negative.  Negative for activity change, appetite change, chills, fever and unexpected weight  loss.   HENT: Negative.  Negative for ear pain, postnasal drip, rhinorrhea, sinus pressure, sneezing, sore throat, swollen glands, trouble swallowing and voice change.    Eyes: Negative.  Negative for discharge, redness, itching and visual disturbance.   Respiratory: Negative.  Negative for cough, shortness of breath, wheezing and stridor.    Cardiovascular: Negative.  Negative for chest pain, palpitations and leg swelling.   Gastrointestinal: Negative.  Positive for GERD and indigestion.   Endocrine: Negative.  Negative for polydipsia, polyphagia and polyuria.   Genitourinary: Negative.  Negative for dysuria.   Musculoskeletal: Positive for back pain. Negative for arthralgias.   Skin: Negative.    Allergic/Immunologic: Negative.    Neurological: Negative.    Hematological: Negative.    Psychiatric/Behavioral: Negative.  Negative for behavioral problems, dysphoric mood, sleep disturbance, suicidal ideas and depressed mood. The patient is not nervous/anxious.    All other systems reviewed and are negative.    PHQ-9 Depression Screening  Little interest or pleasure in doing things?     Feeling down, depressed, or hopeless?     Trouble falling or staying asleep, or sleeping too much?     Feeling tired or having little energy?     Poor appetite or overeating?     Feeling bad about yourself - or that you are a failure or have let yourself or your family down?     Trouble concentrating on things, such as reading the newspaper or watching television?     Moving or speaking so slowly that other people could have noticed? Or the opposite - being so fidgety or restless that you have been moving around a lot more than usual?     Thoughts that you would be better off dead, or of hurting yourself in some way?     PHQ-9 Total Score     If you checked off any problems, how difficult have these problems made it for you to do your work, take care of things at home, or get along with other people?        Objective   Physical  Exam  Vitals signs and nursing note reviewed.   Constitutional:       General: He is not in acute distress.     Appearance: Normal appearance. He is well-developed and normal weight. He is not ill-appearing or diaphoretic.   HENT:      Head: Normocephalic and atraumatic.      Nose: Nose normal.   Eyes:      General: No scleral icterus.        Right eye: No discharge.         Left eye: No discharge.      Conjunctiva/sclera: Conjunctivae normal.      Pupils: Pupils are equal, round, and reactive to light.   Neck:      Musculoskeletal: Normal range of motion. No neck rigidity or muscular tenderness.      Thyroid: No thyromegaly.      Vascular: No carotid bruit or JVD.      Trachea: No tracheal deviation.   Cardiovascular:      Rate and Rhythm: Normal rate and regular rhythm.      Pulses: Normal pulses.      Heart sounds: Normal heart sounds. No murmur. No friction rub. No gallop.    Pulmonary:      Effort: Pulmonary effort is normal. No respiratory distress.      Breath sounds: Normal breath sounds. No stridor. No wheezing, rhonchi or rales.   Chest:      Chest wall: No tenderness.   Abdominal:      General: Bowel sounds are normal. There is no distension.      Palpations: Abdomen is soft. There is no mass.      Tenderness: There is no abdominal tenderness. There is no right CVA tenderness, left CVA tenderness, guarding or rebound.      Hernia: No hernia is present.   Musculoskeletal: Normal range of motion.         General: No tenderness or deformity.   Lymphadenopathy:      Cervical: No cervical adenopathy.   Skin:     General: Skin is warm and dry.      Capillary Refill: Capillary refill takes 2 to 3 seconds.      Coloration: Skin is not jaundiced or pale.      Findings: No bruising, erythema, lesion or rash.   Neurological:      General: No focal deficit present.      Mental Status: He is alert and oriented to person, place, and time. Mental status is at baseline.      Motor: No weakness.      Gait: Gait normal.    Psychiatric:         Mood and Affect: Mood normal.         Behavior: Behavior normal.         Thought Content: Thought content normal.         Judgment: Judgment normal.         Assessment/Plan   Diagnoses and all orders for this visit:    1. Hyperglycemia (Primary)  -     Hemoglobin A1c    2. Gastroesophageal reflux disease with esophagitis without hemorrhage  -     Ambulatory Referral to Gastroenterology  -     H. Pylori Antigen, Stool - Stool, Per Rectum; Future    3. Mixed hyperlipidemia  -     Lipid Panel; Future  -     pravastatin (PRAVACHOL) 10 MG tablet; Take 1 tablet by mouth Every Night.  Dispense: 90 tablet; Refill: 3    4. Essential hypertension  -     metoprolol succinate XL (TOPROL-XL) 25 MG 24 hr tablet; Take 1 tablet by mouth Daily.  Dispense: 30 tablet; Refill: 5    Other orders  -     Discontinue: pravastatin (PRAVACHOL) 20 MG tablet; Take 1 tablet by mouth Every Night.  Dispense: 30 tablet; Refill: 5          Return in about 3 months (around 5/22/2021), or if symptoms worsen or fail to improve.           This document has been electronically signed by JASMINE Noel on February 22, 2021 09:13 CST

## 2021-02-24 ENCOUNTER — LAB (OUTPATIENT)
Dept: LAB | Facility: OTHER | Age: 66
End: 2021-02-24

## 2021-02-24 DIAGNOSIS — K21.00 GASTROESOPHAGEAL REFLUX DISEASE WITH ESOPHAGITIS WITHOUT HEMORRHAGE: ICD-10-CM

## 2021-02-24 PROCEDURE — 87338 HPYLORI STOOL AG IA: CPT | Performed by: NURSE PRACTITIONER

## 2021-02-25 DIAGNOSIS — N40.1 BPH WITH OBSTRUCTION/LOWER URINARY TRACT SYMPTOMS: ICD-10-CM

## 2021-02-25 DIAGNOSIS — N13.8 BPH WITH OBSTRUCTION/LOWER URINARY TRACT SYMPTOMS: ICD-10-CM

## 2021-02-26 LAB — H PYLORI AG STL QL IA: NEGATIVE

## 2021-02-26 RX ORDER — TAMSULOSIN HYDROCHLORIDE 0.4 MG/1
1 CAPSULE ORAL DAILY
Qty: 30 CAPSULE | Refills: 11 | Status: SHIPPED | OUTPATIENT
Start: 2021-02-26 | End: 2022-01-31

## 2021-03-01 ENCOUNTER — TELEPHONE (OUTPATIENT)
Dept: FAMILY MEDICINE CLINIC | Facility: CLINIC | Age: 66
End: 2021-03-01

## 2021-03-02 ENCOUNTER — TELEPHONE (OUTPATIENT)
Dept: FAMILY MEDICINE CLINIC | Facility: CLINIC | Age: 66
End: 2021-03-02

## 2021-03-02 NOTE — TELEPHONE ENCOUNTER
----- Message from JASMINE Leal sent at 3/2/2021  8:25 AM CST -----  That is a normal blood pressure, which is good. Have him continue it, take at night so if it makes him light headed having a lower blood pressure it starts when he is sleeping anyway. Record blood pressures, if he doesn't pass out, this feeling should pass over a few days to a week as his body adjusts to a normal blood pressure, set up a telephone visit today if he is really feeling poorly, next week if just fatigued, but have him be able to give me a list of blood pressures at the visit, telephone or office.   ----- Message -----  From: Naomi Canales MA  Sent: 3/1/2021  10:54 AM CST  To: JASMINE Leal    WIFE CALLED TO LET US KNOW  BP /64 THIS MORNING- FEELING BAD FEELING LIKE HE IS GOING TO PASS OUT WITH THE NEW MEDICATION SHE PUT HIM ON FOR HIS BP... SHE'S WANTING TO KNOW IF HE NEEDS TO STOP THIS OR WHAT HE NEEDS TO DO ABOUT THIS

## 2021-03-08 ENCOUNTER — TELEPHONE (OUTPATIENT)
Dept: FAMILY MEDICINE CLINIC | Facility: CLINIC | Age: 66
End: 2021-03-08

## 2021-03-08 DIAGNOSIS — M47.26 OSTEOARTHRITIS OF SPINE WITH RADICULOPATHY, LUMBAR REGION: Chronic | ICD-10-CM

## 2021-03-08 DIAGNOSIS — Z79.899 ENCOUNTER FOR LONG-TERM CURRENT USE OF MEDICATION: ICD-10-CM

## 2021-03-08 DIAGNOSIS — E78.2 MIXED HYPERLIPIDEMIA: ICD-10-CM

## 2021-03-08 DIAGNOSIS — M54.50 CHRONIC MIDLINE LOW BACK PAIN WITHOUT SCIATICA: Chronic | ICD-10-CM

## 2021-03-08 DIAGNOSIS — G89.29 CHRONIC MIDLINE LOW BACK PAIN WITHOUT SCIATICA: Chronic | ICD-10-CM

## 2021-03-08 RX ORDER — HYDROCODONE BITARTRATE AND ACETAMINOPHEN 7.5; 325 MG/1; MG/1
1 TABLET ORAL EVERY 6 HOURS PRN
Qty: 120 TABLET | Refills: 0 | Status: SHIPPED | OUTPATIENT
Start: 2021-03-08 | End: 2021-04-10 | Stop reason: SDUPTHER

## 2021-03-08 NOTE — TELEPHONE ENCOUNTER
Patient seen in office every 3 months for chronic pain requiring opiate pain medication. Compliant with medication, visits with no adverse effects noted. TERRY and UDS current and appropriate. Patient called requesting scheduled refill at appropriate interval. Patient understands the risks associated with this controlled medication, including tolerance and addiction.  Patient also agrees to only obtain this medication from me, and not from a another provider, unless that provider is covering for me in my absence.  Patient also agrees to be compliant in dosing, and not self adjust the dose of medication.  A signed controlled substance agreement is on file, and the patient has received a controlled substance education sheet at this a previous visit.  The patient has also signed a consent for treatment with a controlled substance as per [facility] policy. TERRY was obtained.   Refill sent for hydrocodone.     This document has been electronically signed by JASMINE Noel on March 8, 2021 13:26 CST

## 2021-03-25 ENCOUNTER — OFFICE VISIT (OUTPATIENT)
Dept: GASTROENTEROLOGY | Facility: CLINIC | Age: 66
End: 2021-03-25

## 2021-03-25 VITALS
DIASTOLIC BLOOD PRESSURE: 81 MMHG | HEART RATE: 105 BPM | WEIGHT: 158 LBS | SYSTOLIC BLOOD PRESSURE: 150 MMHG | HEIGHT: 72 IN | BODY MASS INDEX: 21.4 KG/M2

## 2021-03-25 DIAGNOSIS — R10.13 EPIGASTRIC PAIN: Primary | ICD-10-CM

## 2021-03-25 DIAGNOSIS — Z12.11 SCREEN FOR COLON CANCER: ICD-10-CM

## 2021-03-25 DIAGNOSIS — Z80.0 FAMILY HISTORY OF GI MALIGNANCY: ICD-10-CM

## 2021-03-25 PROCEDURE — 99204 OFFICE O/P NEW MOD 45 MIN: CPT | Performed by: INTERNAL MEDICINE

## 2021-03-25 RX ORDER — SODIUM, POTASSIUM,MAG SULFATES 17.5-3.13G
1 SOLUTION, RECONSTITUTED, ORAL ORAL EVERY 12 HOURS
Qty: 1 ML | Refills: 0 | Status: SHIPPED | OUTPATIENT
Start: 2021-03-25 | End: 2021-10-11

## 2021-03-25 RX ORDER — DEXTROSE AND SODIUM CHLORIDE 5; .45 G/100ML; G/100ML
30 INJECTION, SOLUTION INTRAVENOUS CONTINUOUS PRN
Status: CANCELLED | OUTPATIENT
Start: 2021-04-13

## 2021-03-25 NOTE — PATIENT INSTRUCTIONS
"BMI for Adults  What is BMI?  Body mass index (BMI) is a number that is calculated from a person's weight and height. BMI can help estimate how much of a person's weight is composed of fat. BMI does not measure body fat directly. Rather, it is an alternative to procedures that directly measure body fat, which can be difficult and expensive.  BMI can help identify people who may be at higher risk for certain medical problems.  What are BMI measurements used for?  BMI is used as a screening tool to identify possible weight problems. It helps determine whether a person is obese, overweight, a healthy weight, or underweight.  BMI is useful for:  · Identifying a weight problem that may be related to a medical condition or may increase the risk for medical problems.  · Promoting changes, such as changes in diet and exercise, to help reach a healthy weight. BMI screening can be repeated to see if these changes are working.  How is BMI calculated?  BMI involves measuring your weight in relation to your height. Both height and weight are measured, and the BMI is calculated from those numbers. This can be done either in English ([place] or metric measurements. Note that charts and online BMI calculators are available to help you find your BMI quickly and easily without having to do these calculations yourself.  To calculate your BMI in English ([place] measurements:    1. Measure your weight in pounds (lb).  2. Multiply the number of pounds by 703.  ? For example, for a person who weighs 180 lb, multiply that number by 703, which equals 126,540.  3. Measure your height in inches. Then multiply that number by itself to get a measurement called \"inches squared.\"  ? For example, for a person who is 70 inches tall, the \"inches squared\" measurement is 70 inches x 70 inches, which equals 4,900 inches squared.  4. Divide the total from step 2 (number of lb x 703) by the total from step 3 (inches squared): 126,540 ÷ 4,900 = 25.8. This is " "your BMI.  To calculate your BMI in metric measurements:  1. Measure your weight in kilograms (kg).  2. Measure your height in meters (m). Then multiply that number by itself to get a measurement called \"meters squared.\"  ? For example, for a person who is 1.75 m tall, the \"meters squared\" measurement is 1.75 m x 1.75 m, which is equal to 3.1 meters squared.  3. Divide the number of kilograms (your weight) by the meters squared number. In this example: 70 ÷ 3.1 = 22.6. This is your BMI.  What do the results mean?  BMI charts are used to identify whether you are underweight, normal weight, overweight, or obese. The following guidelines will be used:  · Underweight: BMI less than 18.5.  · Normal weight: BMI between 18.5 and 24.9.  · Overweight: BMI between 25 and 29.9.  · Obese: BMI of 30 or above.  Keep these notes in mind:  · Weight includes both fat and muscle, so someone with a muscular build, such as an athlete, may have a BMI that is higher than 24.9. In cases like these, BMI is not an accurate measure of body fat.  · To determine if excess body fat is the cause of a BMI of 25 or higher, further assessments may need to be done by a health care provider.  · BMI is usually interpreted in the same way for men and women.  Where to find more information  For more information about BMI, including tools to quickly calculate your BMI, go to these websites:  · Centers for Disease Control and Prevention: www.cdc.gov  · American Heart Association: www.heart.org  · National Heart, Lung, and Blood [place]: www.nhlbi.nih.gov  Summary  · Body mass index (BMI) is a number that is calculated from a person's weight and height.  · BMI may help estimate how much of a person's weight is composed of fat. BMI can help identify those who may be at higher risk for certain medical problems.  · BMI can be measured using English measurements or metric measurements.  · BMI charts are used to identify whether you are underweight, normal " weight, overweight, or obese.  This information is not intended to replace advice given to you by your health care provider. Make sure you discuss any questions you have with your health care provider.  Document Revised: 09/09/2020 Document Reviewed: 07/17/2020  Elsevier Patient Education © 2021 [employer]  MyPlate from [employer]    MyPlate is an outline of a general healthy diet based on the 2010 Dietary Guidelines for Americans, from the U.S. Department of Agriculture (USDA). It sets guidelines for how much food you should eat from each food group based on your age, sex, and level of physical activity.  What are tips for following MyPlate?  To follow MyPlate recommendations:  · Eat a wide variety of fruits and vegetables, grains, and protein foods.  · Serve smaller portions and eat less food throughout the day.  · Limit portion sizes to avoid overeating.  · Enjoy your food.  · Get at least 150 minutes of exercise every week. This is about 30 minutes each day, 5 or more days per week.  It can be difficult to have every meal look like MyPlate. Think about MyPlate as eating guidelines for an entire day, rather than each individual meal.  Fruits and vegetables  · Make half of your plate fruits and vegetables.  · Eat many different colors of fruits and vegetables each day.  · For a 2,000 calorie daily food plan, eat:  ? 2½ cups of vegetables every day.  ? 2 cups of fruit every day.  · 1 cup is equal to:  ? 1 cup raw or cooked vegetables.  ? 1 cup raw fruit.  ? 1 medium-sized orange, apple, or banana.  ? 1 cup 100% fruit or vegetable juice.  ? 2 cups raw leafy greens, such as lettuce, spinach, or kale.  ? ½ cup dried fruit.  Grains  · One fourth of your plate should be grains.  · Make at least half of the grains you eat each day whole grains.  · For a 2,000 calorie daily food plan, eat 6 oz of grains every day.  · 1 oz is equal to:  ? 1 slice bread.  ? 1 cup cereal.  ? ½ cup cooked rice, cereal, or pasta.  Protein  · One  fourth of your plate should be protein.  · Eat a wide variety of protein foods, including meat, poultry, fish, eggs, beans, nuts, and tofu.  · For a 2,000 calorie daily food plan, eat 5½ oz of protein every day.  · 1 oz is equal to:  ? 1 oz meat, poultry, or fish.  ? ¼ cup cooked beans.  ? 1 egg.  ? ½ oz nuts or seeds.  ? 1 Tbsp peanut butter.  Dairy  · Drink fat-free or low-fat (1%) milk.  · Eat or drink dairy as a side to meals.  · For a 2,000 calorie daily food plan, eat or drink 3 cups of dairy every day.  · 1 cup is equal to:  ? 1 cup milk, yogurt, cottage cheese, or soy milk (soy beverage).  ? 2 oz processed cheese.  ? 1½ oz natural cheese.  Fats, oils, salt, and sugars  · Only small amounts of oils are recommended.  · Avoid foods that are high in calories and low in nutritional value (empty calories), like foods high in fat or added sugars.  · Choose foods that are low in salt (sodium). Choose foods that have less than 140 milligrams (mg) of sodium per serving.  · Drink water instead of sugary drinks. Drink enough water each day to keep your urine pale yellow.  Where to find support  · Work with your health care provider or a nutrition specialist (dietitian) to develop a customized eating plan that is right for you.  · Download an [employer] (mobile application) to help you track your daily food intake.  Where to find more information  · Go to ChooseMyPlate.gov for more information.  Summary  · MyPlate is a general guideline for healthy eating from the USDA. It is based on the 2010 Dietary Guidelines for Americans.  · In general, fruits and vegetables should take up ½ of your plate, grains should take up ¼ of your plate, and protein should take up ¼ of your plate.  This information is not intended to replace advice given to you by your health care provider. Make sure you discuss any questions you have with your health care provider.  Document Revised: 05/21/2020 Document Reviewed: 03/19/2018  Elsevier Patient  Education © 2021 [employer] Inc.

## 2021-03-25 NOTE — PROGRESS NOTES
[facility]      Chief Complaint:   Chief Complaint   Patient presents with   • Heartburn       Subjective     HPI:   Patient with epigastric abdominal pain severe in nature.  Patient been taking Tums and Rolaids consistently for this.  Patient has a strong family history of GI cancers and his brother had had GI cancer.  Patient has not had a colonoscopy in greater than 5 years.  Patient denies any changes in bowel habits.    Plan; schedule patient for EGD and colonoscopy to evaluate the cause of patient's epigastric abdominal pain and is a screening test for colon cancer    Past Medical History:   Past Medical History:   Diagnosis Date   • Acute bacterial sinusitis    • Acute pharyngitis    • Acute sinusitis    • Allergic rhinitis    • Chronic obstructive pulmonary disease with acute lower respiratory infection (CMS/HCC)    • Chronic sinusitis    • Coalworker's pneumoconiosis (CMS/HCC)    • Contact dermatitis    • COPD (chronic obstructive pulmonary disease) (CMS/HCC)    • Cough    • Dyspnea    • Encounter for immunization    • Erythrocytosis    • Essential hypertension    • Fever    • Hemoptysis    • Hoarse    • Hyperlipidemia    • Insect bite, nonvenomous of shoulder and upper arm, infected    • Malaise and fatigue     Other   • Need for immunization against influenza    • Osteoarthritis     Chronic daily pain   • Smokes tobacco daily    • Tobacco dependence syndrome    • Viral gastroenteritis 10/10/2013    Resolved   • Wheezing        Past Surgical History:  Past Surgical History:   Procedure Laterality Date   • CYST REMOVAL      neck   • HERNIA REPAIR      Hernia repair w/mesh (1)      • INJECTION OF MEDICATION  03/26/2014    Depo Medrol (Methylprednisone) 80mg (1)      • INJECTION OF MEDICATION  03/26/2014    Dexamethasone (1)      • INJECTION OF MEDICATION  06/04/2013    Kenalog (1)    • INJECTION OF MEDICATION  03/26/2014    Rocephin (1)      • OTHER SURGICAL HISTORY  03/26/2014     Nebulizer Treatment 28170 (1)          Family History:  Family History   Problem Relation Age of Onset   • Colon cancer Father         Colorectal Cancer   • Diabetes Father    • Stomach cancer Brother        Social History:   reports that he has been smoking cigarettes. He has a 35.00 pack-year smoking history. He has never used smokeless tobacco. He reports current alcohol use. He reports current drug use. Drug: Hydrocodone.    Medications:   Prior to Admission medications    Medication Sig Start Date End Date Taking? Authorizing Provider   albuterol (PROVENTIL) (2.5 MG/3ML) 0.083% nebulizer solution Take 2.5 mg by nebulization Every 4 (Four) Hours As Needed for wheezing (Neb tx q6hrs prn cough, congestion, wheezing).   Yes ProviderYusra MD   albuterol sulfate HFA (VENTOLIN HFA) 108 (90 Base) MCG/ACT inhaler Inhale 2 puffs Every 6 (Six) Hours As Needed for Wheezing. 11/15/19  Yes Lou Hinds APRN   amLODIPine (NORVASC) 10 MG tablet TAKE 1 TABLET BY MOUTH DAILY. **HUMANA** 12/2/20  Yes Lou Hinds APRN   Ascorbic Acid (VITAMIN C) 500 MG capsule  10/31/19  Yes Provider, MD Yusra   aspirin 81 MG chewable tablet Chew 81 mg Daily.   Yes Provider, MD Yusra   CloNIDine (CATAPRES) 0.1 MG tablet Take 1 tablet by mouth 2 (Two) Times a Day. If needed for B/P 150/90 or higher. 11/29/18  Yes Lou Hinds APRN   fluticasone (FLONASE) 50 MCG/ACT nasal spray 1 spray into each nostril 2 (Two) Times a Day.   Yes Provider, MD Yusra   Fluticasone Furoate-Vilanterol (BREO ELLIPTA) 100-25 MCG/INH inhaler Inhale 1 puff Daily. 2/14/20  Yes Lou Hinds APRN   HYDROcodone-acetaminophen (Lortab) 7.5-325 MG per tablet Take 1 tablet by mouth Every 6 (Six) Hours As Needed for Moderate Pain . Fill when due 3/8/21  Yes Lou Hinds APRN   montelukast (SINGULAIR) 10 MG tablet TAKE 1 TABLET BY MOUTH EVERY NIGHT. 11/16/20  Yes Hinds, Crystal Debbie, APRN   pantoprazole (PROTONIX) 40 MG EC  "tablet Take 1 tablet by mouth Daily. 1/29/21  Yes Hinds, Lou Lewis APRLEONEL   pravastatin (PRAVACHOL) 10 MG tablet Take 1 tablet by mouth Every Night. 2/22/21  Yes Hinds, Lou Lewis APRLEONEL   tamsulosin (FLOMAX) 0.4 MG capsule 24 hr capsule TAKE 1 CAPSULE BY MOUTH DAILY. 2/26/21  Yes Hinds, Lou Villalban, APRLEONEL   tiZANidine (ZANAFLEX) 2 MG tablet TAKE 1 TABLET BY MOUTH EVERY 8 (EIGHT) HOURS AS NEEDED FOR MUSCLE SPASMS. 12/2/20  Yes Hinds, Lou Villalban, APRLEONEL   metoprolol succinate XL (TOPROL-XL) 25 MG 24 hr tablet Take 1 tablet by mouth Daily. 2/22/21   Hinds, Lou Villalban, APRLEONEL   sodium-potassium-magnesium sulfates (SUPREP) 17.5-3.13-1.6 GM/177ML solution oral solution Take 1 bottle by mouth Every 12 (Twelve) Hours. 3/25/21   Killian Melendez MD       Allergies:  Patient has no known allergies.    ROS:    Review of Systems   Constitutional: Negative for activity change, appetite change and unexpected weight change.   HENT: Negative for congestion, sore throat and trouble swallowing.    Respiratory: Negative for cough, choking and shortness of breath.    Cardiovascular: Negative for chest pain.   Gastrointestinal: Positive for abdominal pain. Negative for abdominal distention, anal bleeding, blood in stool, constipation, diarrhea, nausea, rectal pain and vomiting.   Endocrine: Negative for heat intolerance, polydipsia and polyphagia.   Genitourinary: Negative for difficulty urinating.   Musculoskeletal: Negative for arthralgias.   Skin: Negative for color change, pallor, rash and wound.   Allergic/Immunologic: Negative for food allergies.   Neurological: Negative for dizziness, syncope, weakness and headaches.   Psychiatric/Behavioral: Negative for agitation, behavioral problems, confusion and decreased concentration.     Objective     Blood pressure 150/81, pulse 105, height 182.9 cm (72\"), weight 71.7 kg (158 lb).    Physical Exam  Constitutional:       General: He is not in acute distress.     Appearance: He is " well-developed. He is not diaphoretic.   HENT:      Head: Normocephalic and atraumatic.   Cardiovascular:      Rate and Rhythm: Normal rate and regular rhythm.      Heart sounds: Normal heart sounds. No murmur heard.   No friction rub. No gallop.    Pulmonary:      Effort: No respiratory distress.      Breath sounds: Normal breath sounds. No wheezing or rales.   Chest:      Chest wall: No tenderness.   Abdominal:      General: Bowel sounds are normal. There is no distension.      Palpations: Abdomen is soft. There is no mass.      Tenderness: There is no abdominal tenderness. There is no guarding or rebound.      Hernia: No hernia is present.   Musculoskeletal:         General: Normal range of motion.   Skin:     General: Skin is warm and dry.      Coloration: Skin is not pale.      Findings: No erythema or rash.   Neurological:      Mental Status: He is alert and oriented to person, place, and time.   Psychiatric:         Behavior: Behavior normal.         Thought Content: Thought content normal.         Judgment: Judgment normal.          Assessment/Plan   Diagnoses and all orders for this visit:    1. Epigastric pain (Primary)  -     Case Request; Standing  -     dextrose 5 % and sodium chloride 0.45 % infusion  -     Case Request    2. Screen for colon cancer  -     Case Request; Standing  -     dextrose 5 % and sodium chloride 0.45 % infusion  -     Case Request    3. Family history of GI malignancy    Other orders  -     Follow Anesthesia Guidelines / Standing Orders; Future  -     Obtain Informed Consent; Future  -     Implement Anesthesia Orders Day of Procedure; Standing  -     Obtain Informed Consent; Standing  -     POC Glucose Once; Standing  -     Insert Peripheral IV; Standing  -     sodium-potassium-magnesium sulfates (SUPREP) 17.5-3.13-1.6 GM/177ML solution oral solution; Take 1 bottle by mouth Every 12 (Twelve) Hours.  Dispense: 1 mL; Refill: 0        ESOPHAGOGASTRODUODENOSCOPY (N/A), COLONOSCOPY  (N/A)     Diagnosis Plan   1. Epigastric pain  Case Request    dextrose 5 % and sodium chloride 0.45 % infusion    Case Request   2. Screen for colon cancer  Case Request    dextrose 5 % and sodium chloride 0.45 % infusion    Case Request   3. Family history of GI malignancy         Anticipated Surgical Procedure:  Orders Placed This Encounter   Procedures   • Follow Anesthesia Guidelines / Standing Orders     Standing Status:   Future   • Obtain Informed Consent     Standing Status:   Future     Order Specific Question:   Informed Consent Given For     Answer:   egd and colonoscopy       The risks, benefits, and alternatives of this procedure have been discussed with the patient or the responsible party- the patient understands and agrees to proceed.

## 2021-03-26 RX ORDER — POLYETHYLENE GLYCOL 3350, SODIUM CHLORIDE, SODIUM BICARBONATE, POTASSIUM CHLORIDE 420; 11.2; 5.72; 1.48 G/4L; G/4L; G/4L; G/4L
4000 POWDER, FOR SOLUTION ORAL ONCE
Qty: 4000 ML | Refills: 0 | Status: SHIPPED | OUTPATIENT
Start: 2021-03-26 | End: 2021-03-26

## 2021-04-10 ENCOUNTER — LAB (OUTPATIENT)
Dept: LAB | Facility: HOSPITAL | Age: 66
End: 2021-04-10

## 2021-04-10 DIAGNOSIS — M54.50 CHRONIC MIDLINE LOW BACK PAIN WITHOUT SCIATICA: Chronic | ICD-10-CM

## 2021-04-10 DIAGNOSIS — Z79.899 ENCOUNTER FOR LONG-TERM CURRENT USE OF MEDICATION: ICD-10-CM

## 2021-04-10 DIAGNOSIS — M47.26 OSTEOARTHRITIS OF SPINE WITH RADICULOPATHY, LUMBAR REGION: Chronic | ICD-10-CM

## 2021-04-10 DIAGNOSIS — E78.2 MIXED HYPERLIPIDEMIA: ICD-10-CM

## 2021-04-10 DIAGNOSIS — Z01.818 PREOP TESTING: Primary | ICD-10-CM

## 2021-04-10 DIAGNOSIS — G89.29 CHRONIC MIDLINE LOW BACK PAIN WITHOUT SCIATICA: Chronic | ICD-10-CM

## 2021-04-10 LAB — SARS-COV-2 N GENE RESP QL NAA+PROBE: NOT DETECTED

## 2021-04-10 PROCEDURE — 87635 SARS-COV-2 COVID-19 AMP PRB: CPT

## 2021-04-10 PROCEDURE — C9803 HOPD COVID-19 SPEC COLLECT: HCPCS

## 2021-04-12 ENCOUNTER — TELEPHONE (OUTPATIENT)
Dept: FAMILY MEDICINE CLINIC | Facility: CLINIC | Age: 66
End: 2021-04-12

## 2021-04-12 RX ORDER — HYDROCODONE BITARTRATE AND ACETAMINOPHEN 7.5; 325 MG/1; MG/1
1 TABLET ORAL EVERY 6 HOURS PRN
Qty: 120 TABLET | Refills: 0 | Status: SHIPPED | OUTPATIENT
Start: 2021-04-12 | End: 2021-05-09 | Stop reason: SDUPTHER

## 2021-04-12 NOTE — TELEPHONE ENCOUNTER
Patient seen in office every 3 months for chronic pain requiring opiate pain medication. Compliant with medication, visits with no adverse effects noted. TERRY and UDS current and appropriate. Patient called requesting scheduled refill at appropriate interval. Patient understands the risks associated with this controlled medication, including tolerance and addiction.  Patient also agrees to only obtain this medication from me, and not from a another provider, unless that provider is covering for me in my absence.  Patient also agrees to be compliant in dosing, and not self adjust the dose of medication.  A signed controlled substance agreement is on file, and the patient has received a controlled substance education sheet at this a previous visit.  The patient has also signed a consent for treatment with a controlled substance as per [facility] policy. TERRY was obtained.   Refill sent for hydrocodone.     This document has been electronically signed by JASMINE Noel on April 12, 2021 17:42 CDT

## 2021-04-13 ENCOUNTER — ANESTHESIA (OUTPATIENT)
Dept: GASTROENTEROLOGY | Facility: HOSPITAL | Age: 66
End: 2021-04-13

## 2021-04-13 ENCOUNTER — ANESTHESIA EVENT (OUTPATIENT)
Dept: GASTROENTEROLOGY | Facility: HOSPITAL | Age: 66
End: 2021-04-13

## 2021-04-13 ENCOUNTER — HOSPITAL ENCOUNTER (OUTPATIENT)
Facility: HOSPITAL | Age: 66
Setting detail: HOSPITAL OUTPATIENT SURGERY
Discharge: HOME | End: 2021-04-13
Attending: INTERNAL MEDICINE | Admitting: INTERNAL MEDICINE

## 2021-04-13 VITALS
TEMPERATURE: 97.8 F | DIASTOLIC BLOOD PRESSURE: 72 MMHG | HEART RATE: 96 BPM | RESPIRATION RATE: 18 BRPM | SYSTOLIC BLOOD PRESSURE: 134 MMHG | BODY MASS INDEX: 20.45 KG/M2 | OXYGEN SATURATION: 95 % | WEIGHT: 151 LBS | HEIGHT: 72 IN

## 2021-04-13 DIAGNOSIS — R10.13 EPIGASTRIC PAIN: ICD-10-CM

## 2021-04-13 DIAGNOSIS — Z12.11 SCREEN FOR COLON CANCER: ICD-10-CM

## 2021-04-13 PROCEDURE — 43239 EGD BIOPSY SINGLE/MULTIPLE: CPT | Performed by: INTERNAL MEDICINE

## 2021-04-13 PROCEDURE — G0121 COLON CA SCRN NOT HI RSK IND: HCPCS | Performed by: INTERNAL MEDICINE

## 2021-04-13 PROCEDURE — 25010000002 PROPOFOL 10 MG/ML EMULSION: Performed by: NURSE ANESTHETIST, CERTIFIED REGISTERED

## 2021-04-13 PROCEDURE — 88305 TISSUE EXAM BY PATHOLOGIST: CPT

## 2021-04-13 RX ORDER — LIDOCAINE HYDROCHLORIDE 20 MG/ML
INJECTION, SOLUTION EPIDURAL; INFILTRATION; INTRACAUDAL; PERINEURAL AS NEEDED
Status: DISCONTINUED | OUTPATIENT
Start: 2021-04-13 | End: 2021-04-13 | Stop reason: SURG

## 2021-04-13 RX ORDER — ONDANSETRON 2 MG/ML
4 INJECTION INTRAMUSCULAR; INTRAVENOUS ONCE AS NEEDED
Status: DISCONTINUED | OUTPATIENT
Start: 2021-04-13 | End: 2021-04-13 | Stop reason: HOSPADM

## 2021-04-13 RX ORDER — PROPOFOL 10 MG/ML
VIAL (ML) INTRAVENOUS AS NEEDED
Status: DISCONTINUED | OUTPATIENT
Start: 2021-04-13 | End: 2021-04-13 | Stop reason: SURG

## 2021-04-13 RX ORDER — DEXTROSE AND SODIUM CHLORIDE 5; .45 G/100ML; G/100ML
30 INJECTION, SOLUTION INTRAVENOUS CONTINUOUS PRN
Status: DISCONTINUED | OUTPATIENT
Start: 2021-04-13 | End: 2021-04-13 | Stop reason: HOSPADM

## 2021-04-13 RX ADMIN — PROPOFOL 30 MG: 10 INJECTION, EMULSION INTRAVENOUS at 14:30

## 2021-04-13 RX ADMIN — PROPOFOL 20 MG: 10 INJECTION, EMULSION INTRAVENOUS at 14:33

## 2021-04-13 RX ADMIN — PROPOFOL 20 MG: 10 INJECTION, EMULSION INTRAVENOUS at 14:35

## 2021-04-13 RX ADMIN — PROPOFOL 30 MG: 10 INJECTION, EMULSION INTRAVENOUS at 14:39

## 2021-04-13 RX ADMIN — PROPOFOL 20 MG: 10 INJECTION, EMULSION INTRAVENOUS at 14:28

## 2021-04-13 RX ADMIN — PROPOFOL 20 MG: 10 INJECTION, EMULSION INTRAVENOUS at 14:29

## 2021-04-13 RX ADMIN — PROPOFOL 50 MG: 10 INJECTION, EMULSION INTRAVENOUS at 14:27

## 2021-04-13 RX ADMIN — PROPOFOL 70 MG: 10 INJECTION, EMULSION INTRAVENOUS at 14:25

## 2021-04-13 RX ADMIN — PROPOFOL 20 MG: 10 INJECTION, EMULSION INTRAVENOUS at 14:36

## 2021-04-13 RX ADMIN — DEXTROSE AND SODIUM CHLORIDE 30 ML/HR: 5; 450 INJECTION, SOLUTION INTRAVENOUS at 13:56

## 2021-04-13 RX ADMIN — PROPOFOL 40 MG: 10 INJECTION, EMULSION INTRAVENOUS at 14:31

## 2021-04-13 RX ADMIN — LIDOCAINE HYDROCHLORIDE 80 MG: 20 INJECTION, SOLUTION EPIDURAL; INFILTRATION; INTRACAUDAL; PERINEURAL at 14:24

## 2021-04-13 NOTE — ANESTHESIA PREPROCEDURE EVALUATION
Anesthesia Evaluation     Patient summary reviewed and Nursing notes reviewed   NPO Solid Status: > 8 hours  NPO Liquid Status: > 8 hours           Airway   Mallampati: II  TM distance: >3 FB  Neck ROM: full  No difficulty expected  Dental    (+) upper dentures and lower dentures    Pulmonary - normal exam   (+) a smoker Current Smoked day of surgery, COPD moderate,   Cardiovascular - normal exam    ECG reviewed    (+) hypertension well controlled 2 medications or greater, hyperlipidemia,   (-) CAD      Neuro/Psych- negative ROS  (-) seizures, CVA  GI/Hepatic/Renal/Endo    (+)  GERD well controlled,    (-) liver disease, diabetes    Musculoskeletal     (+) chronic pain,   Abdominal    Substance History   (+) alcohol use (8 drinks per day),       Comment: Prescription lortab     OB/GYN          Other   arthritis,      (-) history of cancer  ROS/Med Hx Other: Polycythemia                Anesthesia Plan    ASA 3     MAC     intravenous induction     Anesthetic plan, all risks, benefits, and alternatives have been provided, discussed and informed consent has been obtained with: patient.

## 2021-04-13 NOTE — ANESTHESIA POSTPROCEDURE EVALUATION
Patient: Derrell Bynum    Procedure Summary     Date: 04/13/21 Room / Location: [facility] ENDOSCOPY 1 / [facility] ENDOSCOPY    Anesthesia Start: 1420 Anesthesia Stop: 1442    Procedures:       ESOPHAGOGASTRODUODENOSCOPY (N/A )      COLONOSCOPY (N/A ) Diagnosis:       Epigastric pain      Screen for colon cancer      (Epigastric pain [R10.13])      (Screen for colon cancer [Z12.11])    Surgeons: Killian Melendez MD Provider: Edy Walker CRNA    Anesthesia Type: MAC ASA Status: 3          Anesthesia Type: MAC    Vitals  No vitals data found for the desired time range.          Post Anesthesia Care and Evaluation    Patient location during evaluation: bedside  Patient participation: complete - patient participated  Level of consciousness: awake  Pain score: 0  Pain management: adequate  Airway patency: patent  Anesthetic complications: No anesthetic complications  PONV Status: none  Cardiovascular status: acceptable  Respiratory status: acceptable  Hydration status: acceptable    Comments: ---------------------------               04/13/21                      1345         ---------------------------   BP:          168/78         Pulse:         113          Resp:          18           Temp:   97.9 °F (36.6 °C)   SpO2:          95%         ---------------------------

## 2021-04-16 LAB
LAB AP CASE REPORT: NORMAL
PATH REPORT.FINAL DX SPEC: NORMAL

## 2021-04-22 ENCOUNTER — OFFICE VISIT (OUTPATIENT)
Dept: GASTROENTEROLOGY | Facility: CLINIC | Age: 66
End: 2021-04-22

## 2021-04-22 VITALS
DIASTOLIC BLOOD PRESSURE: 83 MMHG | HEART RATE: 76 BPM | BODY MASS INDEX: 21.4 KG/M2 | SYSTOLIC BLOOD PRESSURE: 177 MMHG | HEIGHT: 72 IN | WEIGHT: 158 LBS

## 2021-04-22 DIAGNOSIS — R10.13 EPIGASTRIC PAIN: Primary | ICD-10-CM

## 2021-04-22 PROCEDURE — 99214 OFFICE O/P EST MOD 30 MIN: CPT | Performed by: INTERNAL MEDICINE

## 2021-04-22 NOTE — PROGRESS NOTES
[facility]      Chief Complaint:   Chief Complaint   Patient presents with   • Follow-up     4 week, epigastric pain       Subjective     HPI:   Patient with epigastric abdominal pain not severe in nature.  Patient states that it is markedly improved.  Patient is on medication for his epigastric abdominal pain which has improved his symptoms significantly.  Patient had colonoscopy which was normal.  Patient will need repeat colonoscopy in 5 years.    Plan; patient to continue with proton pump inhibitor will have follow-up up as needed for epigastric abdominal pain.  Discussed patient taking large amounts of water with his medications    Past Medical History:   Past Medical History:   Diagnosis Date   • Acute bacterial sinusitis    • Acute pharyngitis    • Acute sinusitis    • Allergic rhinitis    • Chronic obstructive pulmonary disease with acute lower respiratory infection (CMS/HCC)    • Chronic sinusitis    • Coalworker's pneumoconiosis (CMS/HCC)    • Contact dermatitis    • COPD (chronic obstructive pulmonary disease) (CMS/HCC)    • Cough    • Dyspnea    • Encounter for immunization    • Erythrocytosis    • Essential hypertension    • Fever    • Hemoptysis    • Hoarse    • Hyperlipidemia    • Insect bite, nonvenomous of shoulder and upper arm, infected    • Malaise and fatigue     Other   • Need for immunization against influenza    • Osteoarthritis     Chronic daily pain   • Smokes tobacco daily    • Tobacco dependence syndrome    • Viral gastroenteritis 10/10/2013    Resolved   • Wheezing        Past Surgical History:  Past Surgical History:   Procedure Laterality Date   • COLONOSCOPY N/A 4/13/2021    Procedure: COLONOSCOPY;  Surgeon: Killian Melendez MD;  Location: [facility] ENDOSCOPY;  Service: Gastroenterology;  Laterality: N/A;   • CYST REMOVAL      neck   • ENDOSCOPY N/A 4/13/2021    Procedure: ESOPHAGOGASTRODUODENOSCOPY;  Surgeon: Killian Melendez MD;  Location: [facility] ENDOSCOPY;   Service: Gastroenterology;  Laterality: N/A;   • HERNIA REPAIR      Hernia repair w/mesh (1)      • INJECTION OF MEDICATION  03/26/2014    Depo Medrol (Methylprednisone) 80mg (1)      • INJECTION OF MEDICATION  03/26/2014    Dexamethasone (1)      • INJECTION OF MEDICATION  06/04/2013    Kenalog (1)    • INJECTION OF MEDICATION  03/26/2014    Rocephin (1)      • OTHER SURGICAL HISTORY  03/26/2014    Nebulizer Treatment 87462 (1)          Family History:  Family History   Problem Relation Age of Onset   • Colon cancer Father         Colorectal Cancer   • Diabetes Father    • Stomach cancer Brother        Social History:   reports that he has been smoking cigarettes. He has a 35.00 pack-year smoking history. He has never used smokeless tobacco. He reports current alcohol use. He reports current drug use. Drug: Hydrocodone.    Medications:   Prior to Admission medications    Medication Sig Start Date End Date Taking? Authorizing Provider   albuterol (PROVENTIL) (2.5 MG/3ML) 0.083% nebulizer solution Take 2.5 mg by nebulization Every 4 (Four) Hours As Needed for wheezing (Neb tx q6hrs prn cough, congestion, wheezing).   Yes Yusra Patel MD   albuterol sulfate HFA (VENTOLIN HFA) 108 (90 Base) MCG/ACT inhaler Inhale 2 puffs Every 6 (Six) Hours As Needed for Wheezing. 11/15/19  Yes Lou Hinds APRN   amLODIPine (NORVASC) 10 MG tablet TAKE 1 TABLET BY MOUTH DAILY. **HUMANA** 12/2/20  Yes Lou Hinds APRN   Ascorbic Acid (VITAMIN C) 500 MG capsule  10/31/19  Yes ProviderYusra MD   aspirin 81 MG chewable tablet Chew 81 mg Daily.   Yes ProviderYusra MD   CloNIDine (CATAPRES) 0.1 MG tablet Take 1 tablet by mouth 2 (Two) Times a Day. If needed for B/P 150/90 or higher. 11/29/18  Yes Lou Hinds APRN   fluticasone (FLONASE) 50 MCG/ACT nasal spray 1 spray into each nostril 2 (Two) Times a Day.   Yes Yusra Patel MD   Fluticasone Furoate-Vilanterol (BREO ELLIPTA) 100-25  MCG/INH inhaler Inhale 1 puff Daily. 2/14/20  Yes Hinds, JASMINE Apple   HYDROcodone-acetaminophen (Lortab) 7.5-325 MG per tablet Take 1 tablet by mouth Every 6 (Six) Hours As Needed for Moderate Pain . Fill when due 4/12/21  Yes HindsLou APRN   montelukast (SINGULAIR) 10 MG tablet TAKE 1 TABLET BY MOUTH EVERY NIGHT. 11/16/20  Yes Hinds, JASMINE Apple   pantoprazole (PROTONIX) 40 MG EC tablet Take 1 tablet by mouth Daily. 1/29/21  Yes Lou Hinds APRN   pravastatin (PRAVACHOL) 10 MG tablet Take 1 tablet by mouth Every Night. 2/22/21  Yes Hinds, JASMINE Apple   sodium-potassium-magnesium sulfates (SUPREP) 17.5-3.13-1.6 GM/177ML solution oral solution Take 1 bottle by mouth Every 12 (Twelve) Hours. 3/25/21  Yes Killian Melendez MD   tamsulosin (FLOMAX) 0.4 MG capsule 24 hr capsule TAKE 1 CAPSULE BY MOUTH DAILY. 2/26/21  Yes HindsLou APRN   tiZANidine (ZANAFLEX) 2 MG tablet TAKE 1 TABLET BY MOUTH EVERY 8 (EIGHT) HOURS AS NEEDED FOR MUSCLE SPASMS. 12/2/20  Yes Hinds, JASMINE Apple       Allergies:  Patient has no known allergies.    ROS:    Review of Systems   Constitutional: Negative for activity change, appetite change and unexpected weight change.   HENT: Negative for congestion, sore throat and trouble swallowing.    Respiratory: Negative for cough, choking and shortness of breath.    Cardiovascular: Negative for chest pain.   Gastrointestinal: Positive for abdominal pain. Negative for abdominal distention, anal bleeding, blood in stool, constipation, diarrhea, nausea, rectal pain and vomiting.   Endocrine: Negative for heat intolerance, polydipsia and polyphagia.   Genitourinary: Negative for difficulty urinating.   Musculoskeletal: Negative for arthralgias.   Skin: Negative for color change, pallor, rash and wound.   Allergic/Immunologic: Negative for food allergies.   Neurological: Negative for dizziness, syncope, weakness and headaches.   Psychiatric/Behavioral:  "Negative for agitation, behavioral problems, confusion and decreased concentration.     Objective     Blood pressure 177/83, pulse 76, height 182.9 cm (72\"), weight 71.7 kg (158 lb).    Physical Exam  Constitutional:       General: He is not in acute distress.     Appearance: He is well-developed. He is not diaphoretic.   HENT:      Head: Normocephalic and atraumatic.   Cardiovascular:      Rate and Rhythm: Normal rate and regular rhythm.      Heart sounds: Normal heart sounds. No murmur heard.   No friction rub. No gallop.    Pulmonary:      Effort: No respiratory distress.      Breath sounds: Normal breath sounds. No wheezing or rales.   Chest:      Chest wall: No tenderness.   Abdominal:      General: Bowel sounds are normal. There is no distension.      Palpations: Abdomen is soft. There is no mass.      Tenderness: There is no abdominal tenderness. There is no guarding or rebound.      Hernia: No hernia is present.   Musculoskeletal:         General: Normal range of motion.   Skin:     General: Skin is warm and dry.      Coloration: Skin is not pale.      Findings: No erythema or rash.   Neurological:      Mental Status: He is alert and oriented to person, place, and time.   Psychiatric:         Behavior: Behavior normal.         Thought Content: Thought content normal.         Judgment: Judgment normal.          Assessment/Plan   Diagnoses and all orders for this visit:    1. Epigastric pain (Primary)        * Surgery not found *     Diagnosis Plan   1. Epigastric pain         Anticipated Surgical Procedure:  No orders of the defined types were placed in this encounter.      The risks, benefits, and alternatives of this procedure have been discussed with the patient or the responsible party- the patient understands and agrees to proceed.                                                                "

## 2021-05-09 DIAGNOSIS — M54.50 CHRONIC MIDLINE LOW BACK PAIN WITHOUT SCIATICA: Chronic | ICD-10-CM

## 2021-05-09 DIAGNOSIS — G89.29 CHRONIC MIDLINE LOW BACK PAIN WITHOUT SCIATICA: Chronic | ICD-10-CM

## 2021-05-09 DIAGNOSIS — Z79.899 ENCOUNTER FOR LONG-TERM CURRENT USE OF MEDICATION: ICD-10-CM

## 2021-05-09 DIAGNOSIS — E78.2 MIXED HYPERLIPIDEMIA: ICD-10-CM

## 2021-05-09 DIAGNOSIS — M47.26 OSTEOARTHRITIS OF SPINE WITH RADICULOPATHY, LUMBAR REGION: Chronic | ICD-10-CM

## 2021-05-10 ENCOUNTER — TELEPHONE (OUTPATIENT)
Dept: FAMILY MEDICINE CLINIC | Facility: CLINIC | Age: 66
End: 2021-05-10

## 2021-05-10 RX ORDER — HYDROCODONE BITARTRATE AND ACETAMINOPHEN 7.5; 325 MG/1; MG/1
1 TABLET ORAL EVERY 6 HOURS PRN
Qty: 120 TABLET | Refills: 0 | Status: SHIPPED | OUTPATIENT
Start: 2021-05-10 | End: 2021-06-06 | Stop reason: SDUPTHER

## 2021-05-10 NOTE — TELEPHONE ENCOUNTER
Patient seen in office every 3 months for chronic pain requiring opiate pain medication. Compliant with medication, visits with no adverse effects noted. TERRY and UDS current and appropriate. Patient called requesting scheduled refill at appropriate interval. Patient understands the risks associated with this controlled medication, including tolerance and addiction.  Patient also agrees to only obtain this medication from me, and not from a another provider, unless that provider is covering for me in my absence.  Patient also agrees to be compliant in dosing, and not self adjust the dose of medication.  A signed controlled substance agreement is on file, and the patient has received a controlled substance education sheet at this a previous visit.  The patient has also signed a consent for treatment with a controlled substance as per [facility] policy. TERRY was obtained.   Refill sent for hydrocodone.     This document has been electronically signed by JASMINE Noel on May 10, 2021 15:27 CDT

## 2021-05-17 ENCOUNTER — OFFICE VISIT (OUTPATIENT)
Dept: FAMILY MEDICINE CLINIC | Facility: CLINIC | Age: 66
End: 2021-05-17

## 2021-05-17 VITALS
DIASTOLIC BLOOD PRESSURE: 80 MMHG | SYSTOLIC BLOOD PRESSURE: 128 MMHG | HEART RATE: 77 BPM | WEIGHT: 156.3 LBS | RESPIRATION RATE: 16 BRPM | OXYGEN SATURATION: 98 % | BODY MASS INDEX: 21.17 KG/M2 | HEIGHT: 72 IN

## 2021-05-17 DIAGNOSIS — E78.2 MIXED HYPERLIPIDEMIA: Primary | ICD-10-CM

## 2021-05-17 DIAGNOSIS — I10 ESSENTIAL HYPERTENSION: ICD-10-CM

## 2021-05-17 DIAGNOSIS — Z12.5 SCREENING FOR MALIGNANT NEOPLASM OF PROSTATE: ICD-10-CM

## 2021-05-17 DIAGNOSIS — Z51.81 ENCOUNTER FOR THERAPEUTIC DRUG LEVEL MONITORING: ICD-10-CM

## 2021-05-17 DIAGNOSIS — M62.838 MUSCLE SPASM: ICD-10-CM

## 2021-05-17 DIAGNOSIS — R73.9 HYPERGLYCEMIA: ICD-10-CM

## 2021-05-17 DIAGNOSIS — J30.89 CHRONIC NONSEASONAL ALLERGIC RHINITIS DUE TO POLLEN: ICD-10-CM

## 2021-05-17 PROCEDURE — 99214 OFFICE O/P EST MOD 30 MIN: CPT | Performed by: NURSE PRACTITIONER

## 2021-05-17 RX ORDER — TIZANIDINE 2 MG/1
2 TABLET ORAL EVERY 8 HOURS PRN
Qty: 90 TABLET | Refills: 1 | Status: SHIPPED | OUTPATIENT
Start: 2021-05-17 | End: 2021-07-26 | Stop reason: SDUPTHER

## 2021-05-17 RX ORDER — MONTELUKAST SODIUM 10 MG/1
10 TABLET ORAL NIGHTLY
Qty: 90 TABLET | Refills: 1 | Status: SHIPPED | OUTPATIENT
Start: 2021-05-17 | End: 2021-11-16 | Stop reason: SDUPTHER

## 2021-05-17 RX ORDER — AMLODIPINE BESYLATE 10 MG/1
10 TABLET ORAL DAILY
Qty: 90 TABLET | Refills: 1 | Status: SHIPPED | OUTPATIENT
Start: 2021-05-17 | End: 2021-06-16 | Stop reason: SDUPTHER

## 2021-05-17 NOTE — PROGRESS NOTES
Subjective   Derrell Bynum is a 66 y.o. male.       Chief Complaint   Patient presents with   • Hypertension   • Back Pain     12 week f/u        History of Present Illness     Chronic lower back pain secondary to OA changes with DDD lumbar spine, managed with good relief with current hydrocodone. Compliant with use. No adverse med effects reported. Not due for refill today.     Hyperlipidemia managed with pravastatin 10mg daily. Due for labs in June  Chronic allergic rhinitis, nonseasonal managed with montelukast. Due for refill.also takes flonase nasal spray daily. No current nor recent exacerbation.  GERD managed well with pantoprazole daily. No exacerbation since last visit. Did complete UGI with Dr Hoyt on 4/13/21 showing mild esophagitis and moderate gastritis. Continued pantoprazole. Colonoscopy clear without polyps and recommended repeat in 5 years, 2026.   HTN chronic and at [place] today in office. bP is 128/80 HR 77. Managed with amlodipine 10 mg daily, clonidine BID if needed for elevated BP of 150/90 or higher. Takes rarely.  Muscle spasms in the back managed well with tizanidine. Due for refill today.   COPD stable without current nor recent exacerbation. No admission in past 12 months for exacerbation. Managed with KADEN inhaler alone.   Derrell Bynum  reports that he has been smoking cigarettes. He has a 35.00 pack-year smoking history. He has never used smokeless tobacco.. I have educated him on the risk of diseases from using tobacco products such as cancer, COPD, heart disease and cataracts.  I advised him to quit and he is not willing to quit. I spent 3  minutes counseling the patient. LDCT in 2020 showed lesion in RUL which was resolved by time repeat CT chest performed 11/13/2020.    No other complaints today.     Parts of most recent relevant visit HPI, ROS  and PE may be carried forward and all are updated as appropriate for current situation.         Past Surgical History:   Procedure  Laterality Date   • COLONOSCOPY N/A 4/13/2021    Procedure: COLONOSCOPY;  Surgeon: Killian Melendez MD;  Location: [facility] ENDOSCOPY;  Service: Gastroenterology;  Laterality: N/A;   • CYST REMOVAL      neck   • ENDOSCOPY N/A 4/13/2021    Procedure: ESOPHAGOGASTRODUODENOSCOPY;  Surgeon: Killian Melendez MD;  Location: [facility] ENDOSCOPY;  Service: Gastroenterology;  Laterality: N/A;   • HERNIA REPAIR      Hernia repair w/mesh (1)      • INJECTION OF MEDICATION  03/26/2014    Depo Medrol (Methylprednisone) 80mg (1)      • INJECTION OF MEDICATION  03/26/2014    Dexamethasone (1)      • INJECTION OF MEDICATION  06/04/2013    Kenalog (1)    • INJECTION OF MEDICATION  03/26/2014    Rocephin (1)      • OTHER SURGICAL HISTORY  03/26/2014    Nebulizer Treatment 80393 (1)         Social History     Socioeconomic History   • Marital status:      Spouse name: Not on file   • Number of children: Not on file   • Years of education: Not on file   • Highest education level: Not on file   Tobacco Use   • Smoking status: Current Every Day Smoker     Packs/day: 1.00     Years: 35.00     Pack years: 35.00     Types: Cigarettes   • Smokeless tobacco: Never Used   • Tobacco comment: 20-39 cigarettes/day (5/9/16)   Vaping Use   • Vaping Use: Never used   Substance and Sexual Activity   • Alcohol use: Yes     Comment: Beer - 8 drinks a day. A  typical drinking day (5/9/16)   • Drug use: Yes     Types: Hydrocodone   • Sexual activity: Yes     Partners: Female      The following portions of the patient's history were reviewed and updated as appropriate: He  has a past medical history of Acute bacterial sinusitis, Acute pharyngitis, Acute sinusitis, Allergic rhinitis, Chronic obstructive pulmonary disease with acute lower respiratory infection (CMS/HCC), Chronic sinusitis, Coalworker's pneumoconiosis (CMS/HCC), Contact dermatitis, COPD (chronic obstructive pulmonary disease) (CMS/HCC), Cough, Dyspnea, Encounter for immunization,  Erythrocytosis, Essential hypertension, Fever, Hemoptysis, Hoarse, Hyperlipidemia, Insect bite, nonvenomous of shoulder and upper arm, infected, Malaise and fatigue, Need for immunization against influenza, Osteoarthritis, Smokes tobacco daily, Tobacco dependence syndrome, Viral gastroenteritis (10/10/2013), and Wheezing..    Review of Systems   Constitutional: Negative.  Negative for activity change, appetite change, chills, fever and unexpected weight loss.   HENT: Negative.  Negative for ear pain, postnasal drip, rhinorrhea, sinus pressure, sneezing, sore throat, swollen glands, trouble swallowing and voice change.    Eyes: Negative.  Negative for discharge, redness, itching and visual disturbance.   Respiratory: Negative.  Negative for cough, shortness of breath and wheezing.    Cardiovascular: Negative.  Negative for chest pain and leg swelling.   Gastrointestinal: Negative.    Endocrine: Negative.  Negative for polydipsia, polyphagia and polyuria.   Genitourinary: Negative.  Negative for dysuria.   Musculoskeletal: Positive for back pain. Negative for arthralgias.   Skin: Negative.    Allergic/Immunologic: Negative.    Neurological: Negative.    Hematological: Negative.    Psychiatric/Behavioral: Negative.  Negative for behavioral problems, dysphoric mood, sleep disturbance, suicidal ideas and depressed mood. The patient is not nervous/anxious.    All other systems reviewed and are negative.    PHQ-9 Depression Screening  Little interest or pleasure in doing things?     Feeling down, depressed, or hopeless?     Trouble falling or staying asleep, or sleeping too much?     Feeling tired or having little energy?     Poor appetite or overeating?     Feeling bad about yourself - or that you are a failure or have let yourself or your family down?     Trouble concentrating on things, such as reading the newspaper or watching television?     Moving or speaking so slowly that other people could have noticed? Or the  "opposite - being so fidgety or restless that you have been moving around a lot more than usual?     Thoughts that you would be better off dead, or of hurting yourself in some way?     PHQ-9 Total Score     If you checked off any problems, how difficult have these problems made it for you to do your work, take care of things at home, or get along with other people?      Patient understands the risks associated with this controlled medication, including tolerance and addiction.  Patient also agrees to only obtain this medication from me, and not from a another provider, unless that provider is covering for me in my absence.  Patient also agrees to be compliant in dosing, and not self adjust the dose of medication.  A signed controlled substance agreement is on file, and the patient has received a controlled substance education sheet at this a previous visit.  The patient has also signed a consent for treatment with a controlled substance as per [facility] policy. TERRY was obtained.    Objective    Vitals:    05/17/21 0817   BP: 128/80   BP Location: Left arm   Patient Position: Sitting   Cuff Size: Adult   Pulse: 77   Resp: 16   SpO2: 98%   Weight: 70.9 kg (156 lb 4.8 oz)   Height: 182.9 cm (72\")   PainSc:   9   PainLoc: Back       Physical Exam  Vitals and nursing note reviewed.   Constitutional:       General: He is not in acute distress.     Appearance: Normal appearance. He is well-developed. He is not ill-appearing or diaphoretic.   HENT:      Head: Normocephalic and atraumatic.   Eyes:      General: No scleral icterus.        Right eye: No discharge.         Left eye: No discharge.      Conjunctiva/sclera: Conjunctivae normal.      Pupils: Pupils are equal, round, and reactive to light.   Neck:      Thyroid: No thyromegaly.      Vascular: No carotid bruit or JVD.      Trachea: No tracheal deviation.   Cardiovascular:      Rate and Rhythm: Normal rate and regular rhythm.      Pulses: Normal pulses.      " Heart sounds: Normal heart sounds. No murmur heard.   No friction rub. No gallop.    Pulmonary:      Effort: Pulmonary effort is normal. No respiratory distress.      Breath sounds: Normal breath sounds. No stridor. No wheezing, rhonchi or rales.   Chest:      Chest wall: No tenderness.   Abdominal:      General: Bowel sounds are normal.      Palpations: Abdomen is soft.   Musculoskeletal:         General: No tenderness or deformity. Normal range of motion.      Cervical back: Normal range of motion and neck supple. No rigidity or tenderness.      Right lower leg: No edema.      Left lower leg: No edema.   Lymphadenopathy:      Cervical: No cervical adenopathy.   Skin:     General: Skin is warm and dry.      Capillary Refill: Capillary refill takes 2 to 3 seconds.      Coloration: Skin is not jaundiced or pale.      Findings: No bruising, erythema, lesion or rash.   Neurological:      General: No focal deficit present.      Mental Status: He is alert and oriented to person, place, and time. Mental status is at baseline.      Motor: No weakness.      Gait: Gait normal.   Psychiatric:         Mood and Affect: Mood normal.         Behavior: Behavior normal.         Thought Content: Thought content normal.         Judgment: Judgment normal.           Assessment/Plan   Diagnoses and all orders for this visit:    1. Mixed hyperlipidemia (Primary)  -     Lipid Panel; Future    2. Essential hypertension  -     amLODIPine (NORVASC) 10 MG tablet; Take 1 tablet by mouth Daily.  Dispense: 90 tablet; Refill: 1  -     CBC & Differential; Future  -     Comprehensive Metabolic Panel; Future    3. Muscle spasm  -     tiZANidine (ZANAFLEX) 2 MG tablet; Take 1 tablet by mouth Every 8 (Eight) Hours As Needed for Muscle Spasms.  Dispense: 90 tablet; Refill: 1    4. Chronic nonseasonal allergic rhinitis due to pollen  -     montelukast (SINGULAIR) 10 MG tablet; Take 1 tablet by mouth Every Night.  Dispense: 90 tablet; Refill: 1    5.  Hyperglycemia  -     Comprehensive Metabolic Panel; Future    6. Screening for malignant neoplasm of prostate  -     PSA Screen; Future    7. Encounter for therapeutic drug level monitoring  -     ToxASSURE Select 13 Discrete -; Future      Return in about 12 weeks (around 8/9/2021), or if symptoms worsen or fail to improve.  Patient Instructions   Have labs collected in June, then see me in August.                 This document has been electronically signed by JASMINE Noel on May 17, 2021 09:17 CDT

## 2021-06-06 DIAGNOSIS — E78.2 MIXED HYPERLIPIDEMIA: ICD-10-CM

## 2021-06-06 DIAGNOSIS — M47.26 OSTEOARTHRITIS OF SPINE WITH RADICULOPATHY, LUMBAR REGION: Chronic | ICD-10-CM

## 2021-06-06 DIAGNOSIS — M54.50 CHRONIC MIDLINE LOW BACK PAIN WITHOUT SCIATICA: Chronic | ICD-10-CM

## 2021-06-06 DIAGNOSIS — G89.29 CHRONIC MIDLINE LOW BACK PAIN WITHOUT SCIATICA: Chronic | ICD-10-CM

## 2021-06-06 DIAGNOSIS — Z79.899 ENCOUNTER FOR LONG-TERM CURRENT USE OF MEDICATION: ICD-10-CM

## 2021-06-08 ENCOUNTER — TELEPHONE (OUTPATIENT)
Dept: FAMILY MEDICINE CLINIC | Facility: CLINIC | Age: 66
End: 2021-06-08

## 2021-06-08 RX ORDER — HYDROCODONE BITARTRATE AND ACETAMINOPHEN 7.5; 325 MG/1; MG/1
1 TABLET ORAL EVERY 6 HOURS PRN
Qty: 120 TABLET | Refills: 0 | Status: SHIPPED | OUTPATIENT
Start: 2021-06-08 | End: 2021-07-07 | Stop reason: SDUPTHER

## 2021-06-09 NOTE — TELEPHONE ENCOUNTER
Patient seen in office every 3 months for chronic pain requiring opiate pain medication. Compliant with medication, visits with no adverse effects noted. TERRY and UDS current and appropriate. Patient called requesting scheduled refill at appropriate interval. Patient understands the risks associated with this controlled medication, including tolerance and addiction.  Patient also agrees to only obtain this medication from me, and not from a another provider, unless that provider is covering for me in my absence.  Patient also agrees to be compliant in dosing, and not self adjust the dose of medication.  A signed controlled substance agreement is on file, and the patient has received a controlled substance education sheet at this a previous visit.  The patient has also signed a consent for treatment with a controlled substance as per [facility] policy. TERRY was obtained.   Refill sent for hydrocodone.     This document has been electronically signed by JASMINE Noel on June 8, 2021 19:19 CDT

## 2021-06-16 DIAGNOSIS — I10 ESSENTIAL HYPERTENSION: ICD-10-CM

## 2021-06-17 RX ORDER — AMLODIPINE BESYLATE 10 MG/1
10 TABLET ORAL DAILY
Qty: 90 TABLET | Refills: 1 | Status: SHIPPED | OUTPATIENT
Start: 2021-06-17 | End: 2021-12-21 | Stop reason: SDUPTHER

## 2021-06-22 DIAGNOSIS — K21.00 GASTROESOPHAGEAL REFLUX DISEASE WITH ESOPHAGITIS WITHOUT HEMORRHAGE: ICD-10-CM

## 2021-06-22 RX ORDER — PANTOPRAZOLE SODIUM 40 MG/1
TABLET, DELAYED RELEASE ORAL
Qty: 30 TABLET | Refills: 5 | Status: SHIPPED | OUTPATIENT
Start: 2021-06-22 | End: 2021-07-21 | Stop reason: SDUPTHER

## 2021-07-07 DIAGNOSIS — E78.2 MIXED HYPERLIPIDEMIA: ICD-10-CM

## 2021-07-07 DIAGNOSIS — M47.26 OSTEOARTHRITIS OF SPINE WITH RADICULOPATHY, LUMBAR REGION: Chronic | ICD-10-CM

## 2021-07-07 DIAGNOSIS — M54.50 CHRONIC MIDLINE LOW BACK PAIN WITHOUT SCIATICA: Chronic | ICD-10-CM

## 2021-07-07 DIAGNOSIS — Z79.899 ENCOUNTER FOR LONG-TERM CURRENT USE OF MEDICATION: ICD-10-CM

## 2021-07-07 DIAGNOSIS — G89.29 CHRONIC MIDLINE LOW BACK PAIN WITHOUT SCIATICA: Chronic | ICD-10-CM

## 2021-07-08 ENCOUNTER — TELEPHONE (OUTPATIENT)
Dept: FAMILY MEDICINE CLINIC | Facility: CLINIC | Age: 66
End: 2021-07-08

## 2021-07-08 RX ORDER — HYDROCODONE BITARTRATE AND ACETAMINOPHEN 7.5; 325 MG/1; MG/1
1 TABLET ORAL EVERY 6 HOURS PRN
Qty: 120 TABLET | Refills: 0 | Status: SHIPPED | OUTPATIENT
Start: 2021-07-08 | End: 2021-08-09 | Stop reason: SDUPTHER

## 2021-07-08 NOTE — TELEPHONE ENCOUNTER
Patient seen in office every 3 months for chronic pain requiring opiate pain medication. Compliant with medication, visits with no adverse effects noted. TERRY and UDS current and appropriate. Patient called requesting scheduled refill at appropriate interval. Patient understands the risks associated with this controlled medication, including tolerance and addiction.  Patient also agrees to only obtain this medication from me, and not from a another provider, unless that provider is covering for me in my absence.  Patient also agrees to be compliant in dosing, and not self adjust the dose of medication.  A signed controlled substance agreement is on file, and the patient has received a controlled substance education sheet at this a previous visit.  The patient has also signed a consent for treatment with a controlled substance as per [facility] policy. TERRY was obtained.   Refill sent for hydrocodone.     This document has been electronically signed by JASMINE Noel on July 8, 2021 14:36 CDT

## 2021-07-19 ENCOUNTER — LAB (OUTPATIENT)
Dept: LAB | Facility: OTHER | Age: 66
End: 2021-07-19

## 2021-07-19 DIAGNOSIS — R73.9 HYPERGLYCEMIA: ICD-10-CM

## 2021-07-19 DIAGNOSIS — I10 ESSENTIAL HYPERTENSION: ICD-10-CM

## 2021-07-19 DIAGNOSIS — E78.2 MIXED HYPERLIPIDEMIA: ICD-10-CM

## 2021-07-19 DIAGNOSIS — Z51.81 ENCOUNTER FOR THERAPEUTIC DRUG LEVEL MONITORING: ICD-10-CM

## 2021-07-19 DIAGNOSIS — Z12.5 SCREENING FOR MALIGNANT NEOPLASM OF PROSTATE: ICD-10-CM

## 2021-07-19 LAB
ALBUMIN SERPL-MCNC: 4 G/DL (ref 3.5–5.2)
ALBUMIN/GLOB SERPL: 1.2 G/DL
ALP SERPL-CCNC: 82 U/L (ref 39–117)
ALT SERPL W P-5'-P-CCNC: 28 U/L (ref 1–41)
ANION GAP SERPL CALCULATED.3IONS-SCNC: 9 MMOL/L (ref 5–15)
AST SERPL-CCNC: 45 U/L (ref 1–40)
BILIRUB SERPL-MCNC: 0.5 MG/DL (ref 0–1.2)
BUN SERPL-MCNC: 3 MG/DL (ref 8–23)
BUN/CREAT SERPL: 3.9 (ref 7–25)
CALCIUM SPEC-SCNC: 8.8 MG/DL (ref 8.6–10.5)
CHLORIDE SERPL-SCNC: 89 MMOL/L (ref 98–107)
CHOLEST SERPL-MCNC: 176 MG/DL (ref 0–200)
CO2 SERPL-SCNC: 30 MMOL/L (ref 22–29)
CREAT SERPL-MCNC: 0.77 MG/DL (ref 0.76–1.27)
DEPRECATED RDW RBC AUTO: 46 FL (ref 37–54)
EOSINOPHIL # BLD MANUAL: 0.25 10*3/MM3 (ref 0–0.4)
EOSINOPHIL NFR BLD MANUAL: 3 % (ref 0.3–6.2)
ERYTHROCYTE [DISTWIDTH] IN BLOOD BY AUTOMATED COUNT: 13.6 % (ref 12.3–15.4)
GFR SERPL CREATININE-BSD FRML MDRD: 102 ML/MIN/1.73
GLOBULIN UR ELPH-MCNC: 3.4 GM/DL
GLUCOSE SERPL-MCNC: 122 MG/DL (ref 65–99)
HCT VFR BLD AUTO: 50.4 % (ref 37.5–51)
HDLC SERPL-MCNC: 116 MG/DL (ref 40–60)
HGB BLD-MCNC: 18.2 G/DL (ref 13–17.7)
LDLC SERPL CALC-MCNC: 49 MG/DL (ref 0–100)
LDLC/HDLC SERPL: 0.42 {RATIO}
LYMPHOCYTES # BLD MANUAL: 1.56 10*3/MM3 (ref 0.7–3.1)
LYMPHOCYTES NFR BLD MANUAL: 19 % (ref 19.6–45.3)
LYMPHOCYTES NFR BLD MANUAL: 9 % (ref 5–12)
MCH RBC QN AUTO: 34.1 PG (ref 26.6–33)
MCHC RBC AUTO-ENTMCNC: 36.1 G/DL (ref 31.5–35.7)
MCV RBC AUTO: 94.4 FL (ref 79–97)
MONOCYTES # BLD AUTO: 0.74 10*3/MM3 (ref 0.1–0.9)
NEUTROPHILS # BLD AUTO: 5.66 10*3/MM3 (ref 1.7–7)
NEUTROPHILS NFR BLD MANUAL: 69 % (ref 42.7–76)
PLATELET # BLD AUTO: 292 10*3/MM3 (ref 140–450)
PMV BLD AUTO: 9.8 FL (ref 6–12)
POTASSIUM SERPL-SCNC: 4.3 MMOL/L (ref 3.5–5.2)
PROT SERPL-MCNC: 7.4 G/DL (ref 6–8.5)
RBC # BLD AUTO: 5.34 10*6/MM3 (ref 4.14–5.8)
RBC MORPH BLD: NORMAL
SMALL PLATELETS BLD QL SMEAR: ADEQUATE
SODIUM SERPL-SCNC: 128 MMOL/L (ref 136–145)
TRIGL SERPL-MCNC: 56 MG/DL (ref 0–150)
VLDLC SERPL-MCNC: 11 MG/DL (ref 5–40)
WBC # BLD AUTO: 8.21 10*3/MM3 (ref 3.4–10.8)
WBC MORPH BLD: NORMAL

## 2021-07-19 PROCEDURE — 85025 COMPLETE CBC W/AUTO DIFF WBC: CPT | Performed by: NURSE PRACTITIONER

## 2021-07-19 PROCEDURE — G0103 PSA SCREENING: HCPCS | Performed by: NURSE PRACTITIONER

## 2021-07-19 PROCEDURE — 80307 DRUG TEST PRSMV CHEM ANLYZR: CPT | Performed by: NURSE PRACTITIONER

## 2021-07-19 PROCEDURE — G0481 DRUG TEST DEF 8-14 CLASSES: HCPCS | Performed by: NURSE PRACTITIONER

## 2021-07-19 PROCEDURE — 80061 LIPID PANEL: CPT | Performed by: NURSE PRACTITIONER

## 2021-07-19 PROCEDURE — 80053 COMPREHEN METABOLIC PANEL: CPT | Performed by: NURSE PRACTITIONER

## 2021-07-19 PROCEDURE — 36415 COLL VENOUS BLD VENIPUNCTURE: CPT | Performed by: NURSE PRACTITIONER

## 2021-07-21 DIAGNOSIS — K21.00 GASTROESOPHAGEAL REFLUX DISEASE WITH ESOPHAGITIS WITHOUT HEMORRHAGE: ICD-10-CM

## 2021-07-21 LAB — PSA SERPL-MCNC: 2.34 NG/ML (ref 0–4)

## 2021-07-22 RX ORDER — PANTOPRAZOLE SODIUM 40 MG/1
40 TABLET, DELAYED RELEASE ORAL EVERY MORNING
Qty: 30 TABLET | Refills: 5 | Status: SHIPPED | OUTPATIENT
Start: 2021-07-22 | End: 2021-12-21

## 2021-07-23 LAB
6MAM UR QL CFM: NEGATIVE
6MAM/CREAT UR: NOT DETECTED NG/MG CREAT
7AMINOCLONAZEPAM/CREAT UR: NOT DETECTED NG/MG CREAT
A-OH ALPRAZ/CREAT UR: NOT DETECTED NG/MG CREAT
A-OH-TRIAZOLAM/CREAT UR CFM: NOT DETECTED NG/MG CREAT
ALFENTANIL/CREAT UR CFM: NOT DETECTED NG/MG CREAT
ALPHA-HYDROXYMIDAZOLAM, URINE: NOT DETECTED NG/MG CREAT
ALPRAZ/CREAT UR CFM: NOT DETECTED NG/MG CREAT
AMOBARBITAL UR QL CFM: NOT DETECTED
AMPHET/CREAT UR: NOT DETECTED NG/MG CREAT
AMPHETAMINES UR QL CFM: NEGATIVE
BARBITAL UR QL CFM: NOT DETECTED
BARBITURATES UR QL CFM: NEGATIVE
BENZODIAZ UR QL CFM: NEGATIVE
BUPRENORPHINE UR QL CFM: NEGATIVE
BUPRENORPHINE/CREAT UR: NOT DETECTED NG/MG CREAT
BUTABARBITAL UR QL CFM: NOT DETECTED
BUTALBITAL UR QL CFM: NOT DETECTED
BZE/CREAT UR: NOT DETECTED NG/MG CREAT
CANNABINOIDS UR QL CFM: NEGATIVE
CARBOXYTHC/CREAT UR: NOT DETECTED NG/MG CREAT
CLONAZEPAM/CREAT UR CFM: NOT DETECTED NG/MG CREAT
COCAETHYLENE/CREAT UR CFM: NOT DETECTED NG/MG CREAT
COCAINE UR QL CFM: NEGATIVE
COCAINE/CREAT UR CFM: NOT DETECTED NG/MG CREAT
CODEINE/CREAT UR: NOT DETECTED NG/MG CREAT
CREAT UR-MCNC: 48 MG/DL
DESALKYLFLURAZ/CREAT UR: NOT DETECTED NG/MG CREAT
DESMETHYLFLUNITRAZEPAM: NOT DETECTED NG/MG CREAT
DHC/CREAT UR: 142 NG/MG CREAT
DIAZEPAM/CREAT UR: NOT DETECTED NG/MG CREAT
DRUGS UR: NORMAL
EDDP/CREAT UR: NOT DETECTED NG/MG CREAT
ETHANOL UR CFM-MCNC: NOT DETECTED G/DL
ETHANOL UR QL CFM: NEGATIVE
FENTANYL UR QL CFM: NEGATIVE
FENTANYL/CREAT UR: NOT DETECTED NG/MG CREAT
FLUNITRAZEPAM UR QL CFM: NOT DETECTED NG/MG CREAT
HYDROCODONE/CREAT UR: 571 NG/MG CREAT
HYDROMORPHONE/CREAT UR: 904 NG/MG CREAT
LEVEL OF DETECTION:: NORMAL
LORAZEPAM/CREAT UR: NOT DETECTED NG/MG CREAT
MDA/CREAT UR: NOT DETECTED NG/MG CREAT
MDMA/CREAT UR: NOT DETECTED NG/MG CREAT
MEPHOBARBITAL UR QL CFM: NOT DETECTED
METHADONE UR QL CFM: NEGATIVE
METHADONE/CREAT UR: NOT DETECTED NG/MG CREAT
METHAMPHET/CREAT UR: NOT DETECTED NG/MG CREAT
MIDAZOLAM/CREAT UR CFM: NOT DETECTED NG/MG CREAT
MORPHINE/CREAT UR: NOT DETECTED NG/MG CREAT
N-NORTRAMADOL/CREAT UR CFM: NOT DETECTED NG/MG CREAT
NARCOTICS UR: NEGATIVE
NORBUPRENORPHINE/CREAT UR: NOT DETECTED NG/MG CREAT
NORCODEINE/CREAT UR CFM: NOT DETECTED NG/MG CREAT
NORDIAZEPAM/CREAT UR: NOT DETECTED NG/MG CREAT
NORFENTANYL/CREAT UR: NOT DETECTED NG/MG CREAT
NORHYDROCODONE/CREAT UR: 508 NG/MG CREAT
NORMORPHINE UR-MCNC: NOT DETECTED NG/MG CREAT
NOROXYCODONE/CREAT UR: NOT DETECTED NG/MG CREAT
NOROXYMORPHONE/CREAT UR CFM: NOT DETECTED NG/MG CREAT
O-NORTRAMADOL UR CFM-MCNC: NOT DETECTED NG/MG CREAT
OPIATES UR QL CFM: NORMAL
OXAZEPAM/CREAT UR: NOT DETECTED NG/MG CREAT
OXYCODONE UR QL CFM: NEGATIVE
OXYCODONE/CREAT UR: NOT DETECTED NG/MG CREAT
OXYMORPHONE/CREAT UR: NOT DETECTED NG/MG CREAT
PENTOBARB UR QL CFM: NOT DETECTED
PHENOBARB UR QL CFM: NOT DETECTED
SECOBARBITAL UR QL CFM: NOT DETECTED
SUFENTANIL/CREAT UR CFM: NOT DETECTED NG/MG CREAT
TAPENTADOL UR QL CFM: NEGATIVE
TAPENTADOL/CREAT UR: NOT DETECTED NG/MG CREAT
TEMAZEPAM/CREAT UR: NOT DETECTED NG/MG CREAT
THIOPENTAL UR QL CFM: NOT DETECTED
TRAMADOL UR QL CFM: NOT DETECTED NG/MG CREAT

## 2021-07-27 DIAGNOSIS — M62.838 MUSCLE SPASM: ICD-10-CM

## 2021-07-27 RX ORDER — TIZANIDINE 2 MG/1
2 TABLET ORAL EVERY 8 HOURS PRN
Qty: 90 TABLET | Refills: 5 | Status: SHIPPED | OUTPATIENT
Start: 2021-07-27 | End: 2022-10-04 | Stop reason: SDUPTHER

## 2021-08-09 ENCOUNTER — OFFICE VISIT (OUTPATIENT)
Dept: FAMILY MEDICINE CLINIC | Facility: CLINIC | Age: 66
End: 2021-08-09

## 2021-08-09 VITALS
SYSTOLIC BLOOD PRESSURE: 150 MMHG | OXYGEN SATURATION: 96 % | RESPIRATION RATE: 18 BRPM | DIASTOLIC BLOOD PRESSURE: 80 MMHG | WEIGHT: 147.5 LBS | HEIGHT: 72 IN | TEMPERATURE: 98.5 F | BODY MASS INDEX: 19.98 KG/M2 | HEART RATE: 83 BPM

## 2021-08-09 DIAGNOSIS — M47.26 OSTEOARTHRITIS OF SPINE WITH RADICULOPATHY, LUMBAR REGION: Chronic | ICD-10-CM

## 2021-08-09 DIAGNOSIS — G89.29 CHRONIC MIDLINE LOW BACK PAIN WITHOUT SCIATICA: Chronic | ICD-10-CM

## 2021-08-09 DIAGNOSIS — I10 ESSENTIAL HYPERTENSION: Primary | ICD-10-CM

## 2021-08-09 DIAGNOSIS — M54.50 CHRONIC MIDLINE LOW BACK PAIN WITHOUT SCIATICA: Chronic | ICD-10-CM

## 2021-08-09 DIAGNOSIS — Z79.899 ENCOUNTER FOR LONG-TERM CURRENT USE OF MEDICATION: ICD-10-CM

## 2021-08-09 DIAGNOSIS — E78.2 MIXED HYPERLIPIDEMIA: ICD-10-CM

## 2021-08-09 PROCEDURE — 99214 OFFICE O/P EST MOD 30 MIN: CPT | Performed by: NURSE PRACTITIONER

## 2021-08-09 RX ORDER — HYDROCODONE BITARTRATE AND ACETAMINOPHEN 7.5; 325 MG/1; MG/1
1 TABLET ORAL EVERY 6 HOURS PRN
Qty: 120 TABLET | Refills: 0 | Status: SHIPPED | OUTPATIENT
Start: 2021-08-09 | End: 2021-09-07 | Stop reason: SDUPTHER

## 2021-08-09 RX ORDER — METOPROLOL SUCCINATE 25 MG/1
25 TABLET, EXTENDED RELEASE ORAL DAILY
Qty: 90 TABLET | Refills: 1 | Status: SHIPPED | OUTPATIENT
Start: 2021-08-09 | End: 2021-11-30

## 2021-08-09 NOTE — PROGRESS NOTES
"Subjective   Derrell Bynum is a 66 y.o. male.       Chief Complaint   Patient presents with   • Follow-up     3 month        History of Present Illness   Chronic lower back pain secondary to DDD lumbar spine, onset more than 2 years ago. Stable. Managed with hydrocodone with reported adequate relief. Due for refill today. Compliant with use and UDS is current and appropriate. TERRY appropriate, reviewed today.   HTN:moderately elevated on repeat is 150/80 on amlodipine 10mg daily. Has a prn clonidine order for BID use if >150/90 but rarely uses and is unaware of actual numbers of his BP at home, states wife checks it and keeps up with it. He was told by her it was a \"little high\" yesterday. Agreeable to med adjustment today.   Hyperlipidemia at goal on labs 7/19/21, LDL is less than 70.  BPH with LUTS, nocturia 2-3 times per night, stable with Flomax.  Chronic allergies managed well without current or recent exacerbation with flonase and montulkast.  COPD managed fair with Breo inhaler and albuterol use about 1 x 2 days per week. Reports breathing \"OK\"  Aware to use albuterol more often when needed.  GERD well controlled symptoms with pantoprazole.    No other complaints today.   Parts of most recent relevant visit HPI, ROS  and PE may be carried forward and all are updated as appropriate for current situation.    Past Surgical History:   Procedure Laterality Date   • COLONOSCOPY N/A 4/13/2021    Procedure: COLONOSCOPY;  Surgeon: Killian Melendez MD;  Location: [facility] ENDOSCOPY;  Service: Gastroenterology;  Laterality: N/A;   • CYST REMOVAL      neck   • ENDOSCOPY N/A 4/13/2021    Procedure: ESOPHAGOGASTRODUODENOSCOPY;  Surgeon: Killian Melendez MD;  Location: [facility] ENDOSCOPY;  Service: Gastroenterology;  Laterality: N/A;   • HERNIA REPAIR      Hernia repair w/mesh (1)      • INJECTION OF MEDICATION  03/26/2014    Depo Medrol (Methylprednisone) 80mg (1)      • INJECTION OF MEDICATION  03/26/2014    " Dexamethasone (1)      • INJECTION OF MEDICATION  06/04/2013    Kenalog (1)    • INJECTION OF MEDICATION  03/26/2014    Rocephin (1)      • OTHER SURGICAL HISTORY  03/26/2014    Nebulizer Treatment 94208 (1)         Social History     Socioeconomic History   • Marital status:      Spouse name: Not on file   • Number of children: Not on file   • Years of education: Not on file   • Highest education level: Not on file   Tobacco Use   • Smoking status: Current Every Day Smoker     Packs/day: 1.00     Years: 35.00     Pack years: 35.00     Types: Cigarettes   • Smokeless tobacco: Never Used   • Tobacco comment: 20-39 cigarettes/day (5/9/16)   Vaping Use   • Vaping Use: Never used   Substance and Sexual Activity   • Alcohol use: Yes     Comment: Beer - 8 drinks a day. A  typical drinking day (5/9/16)   • Drug use: Yes     Types: Hydrocodone   • Sexual activity: Yes     Partners: Female      The following portions of the patient's history were reviewed and updated as appropriate: He  has a past medical history of Acute bacterial sinusitis, Acute pharyngitis, Acute sinusitis, Allergic rhinitis, Chronic obstructive pulmonary disease with acute lower respiratory infection (CMS/HCC), Chronic sinusitis, Coalworker's pneumoconiosis (CMS/HCC), Contact dermatitis, COPD (chronic obstructive pulmonary disease) (CMS/HCC), Cough, Dyspnea, Encounter for immunization, Erythrocytosis, Essential hypertension, Fever, Hemoptysis, Hoarse, Hyperlipidemia, Insect bite, nonvenomous of shoulder and upper arm, infected, Malaise and fatigue, Need for immunization against influenza, Osteoarthritis, Smokes tobacco daily, Tobacco dependence syndrome, Viral gastroenteritis (10/10/2013), and Wheezing..    Review of Systems   Constitutional: Negative.  Negative for activity change, appetite change, chills, fever and unexpected weight loss.   HENT: Negative.  Negative for ear pain, postnasal drip, rhinorrhea, sinus pressure, sneezing, sore  throat, swollen glands, trouble swallowing and voice change.    Eyes: Negative.  Negative for discharge, redness, itching and visual disturbance.   Respiratory: Negative.  Negative for cough, shortness of breath and wheezing.    Cardiovascular: Negative.  Negative for chest pain and leg swelling.   Gastrointestinal: Negative.    Endocrine: Negative.  Negative for polydipsia, polyphagia and polyuria.   Genitourinary: Negative.  Negative for dysuria.   Musculoskeletal: Positive for back pain. Negative for arthralgias.   Skin: Negative.    Allergic/Immunologic: Negative.    Neurological: Negative.    Hematological: Negative.    Psychiatric/Behavioral: Negative.  Negative for behavioral problems, dysphoric mood, sleep disturbance, suicidal ideas and depressed mood. The patient is not nervous/anxious.    All other systems reviewed and are negative.    PHQ-9 Depression Screening  Little interest or pleasure in doing things?     Feeling down, depressed, or hopeless?     Trouble falling or staying asleep, or sleeping too much?     Feeling tired or having little energy?     Poor appetite or overeating?     Feeling bad about yourself - or that you are a failure or have let yourself or your family down?     Trouble concentrating on things, such as reading the newspaper or watching television?     Moving or speaking so slowly that other people could have noticed? Or the opposite - being so fidgety or restless that you have been moving around a lot more than usual?     Thoughts that you would be better off dead, or of hurting yourself in some way?     PHQ-9 Total Score     If you checked off any problems, how difficult have these problems made it for you to do your work, take care of things at home, or get along with other people?      Patient understands the risks associated with this controlled medication, including tolerance and addiction.  Patient also agrees to only obtain this medication from me, and not from a another  "provider, unless that provider is covering for me in my absence.  Patient also agrees to be compliant in dosing, and not self adjust the dose of medication.  A signed controlled substance agreement is on file, and the patient has received a controlled substance education sheet at this a previous visit.  The patient has also signed a consent for treatment with a controlled substance as per [facility] policy. TERRY was obtained.    Objective    Vitals:    08/09/21 0806 08/09/21 0822   BP: 140/60 150/80   BP Location:  Right arm   Patient Position:  Sitting   Cuff Size:  Adult   Pulse: 83    Resp: 18    Temp: 98.5 °F (36.9 °C)    SpO2: 96%    Weight: 66.9 kg (147 lb 8 oz)    Height: 182.9 cm (72\")    PainSc:   7    PainLoc: Back      Body mass index is 20 kg/m².    Physical Exam  Vitals and nursing note reviewed.   Constitutional:       General: He is not in acute distress.     Appearance: Normal appearance. He is well-developed. He is not ill-appearing or diaphoretic.   HENT:      Head: Normocephalic and atraumatic.   Eyes:      General: No scleral icterus.        Right eye: No discharge.         Left eye: No discharge.      Conjunctiva/sclera: Conjunctivae normal.      Pupils: Pupils are equal, round, and reactive to light.   Neck:      Thyroid: No thyromegaly.      Vascular: No carotid bruit or JVD.      Trachea: No tracheal deviation.   Cardiovascular:      Rate and Rhythm: Normal rate and regular rhythm.      Pulses: Normal pulses.      Heart sounds: Normal heart sounds. No murmur heard.   No friction rub. No gallop.    Pulmonary:      Effort: Pulmonary effort is normal. No respiratory distress.      Breath sounds: Normal breath sounds. No stridor. No wheezing, rhonchi or rales.   Chest:      Chest wall: No tenderness.   Abdominal:      General: Bowel sounds are normal.      Palpations: Abdomen is soft.   Musculoskeletal:         General: No tenderness or deformity. Normal range of motion.      Cervical back: " Normal range of motion and neck supple. No rigidity or tenderness.      Right lower leg: No edema.      Left lower leg: No edema.   Lymphadenopathy:      Cervical: No cervical adenopathy.   Skin:     General: Skin is warm and dry.      Capillary Refill: Capillary refill takes 2 to 3 seconds.      Coloration: Skin is not jaundiced or pale.      Findings: No bruising, erythema, lesion or rash.   Neurological:      General: No focal deficit present.      Mental Status: He is alert and oriented to person, place, and time. Mental status is at baseline.      Motor: No weakness.      Gait: Gait normal.   Psychiatric:         Mood and Affect: Mood normal.         Behavior: Behavior normal.         Thought Content: Thought content normal.         Judgment: Judgment normal.     stable BPH managed with tamsulosin, nocturia 2-3 times per night  HTN above goal at 140/60 on repeat is 150/80. Reports high at home yesterday too. Added Toprol XL 25mg daily.   Stable chronic lower back pain refill hydrocodone.  Lipidemia at goal LDL < 70.  CBC mild elevation of HBG at 18.2.  CMP glucose elevated 122, sodium 128/CL 89. Advised to drink Gatorade (low denzel) instead of plain water, stay hydrated, add salty snacks.      Assessment/Plan   Diagnoses and all orders for this visit:    1. Essential hypertension (Primary)  -     metoprolol succinate XL (Toprol XL) 25 MG 24 hr tablet; Take 1 tablet by mouth Daily.  Dispense: 90 tablet; Refill: 1    2. Chronic midline low back pain without sciatica  Comments:  controlled to stated satisfaction today. able to garden and work as needed, and sleep without waking due to pain.   Orders:  -     HYDROcodone-acetaminophen (Lortab) 7.5-325 MG per tablet; Take 1 tablet by mouth Every 6 (Six) Hours As Needed for Moderate Pain . Fill when due  Dispense: 120 tablet; Refill: 0    3. Encounter for long-term current use of medication  -     HYDROcodone-acetaminophen (Lortab) 7.5-325 MG per tablet; Take 1 tablet  by mouth Every 6 (Six) Hours As Needed for Moderate Pain . Fill when due  Dispense: 120 tablet; Refill: 0    4. Osteoarthritis of spine with radiculopathy, lumbar region  -     HYDROcodone-acetaminophen (Lortab) 7.5-325 MG per tablet; Take 1 tablet by mouth Every 6 (Six) Hours As Needed for Moderate Pain . Fill when due  Dispense: 120 tablet; Refill: 0    5. Mixed hyperlipidemia  -     HYDROcodone-acetaminophen (Lortab) 7.5-325 MG per tablet; Take 1 tablet by mouth Every 6 (Six) Hours As Needed for Moderate Pain . Fill when due  Dispense: 120 tablet; Refill: 0      Return in about 12 weeks (around 11/1/2021).               This document has been electronically signed by JASMINE Noel on August 9, 2021 08:23 CDT

## 2021-09-07 ENCOUNTER — TELEPHONE (OUTPATIENT)
Dept: FAMILY MEDICINE CLINIC | Facility: CLINIC | Age: 66
End: 2021-09-07

## 2021-09-07 DIAGNOSIS — E78.2 MIXED HYPERLIPIDEMIA: ICD-10-CM

## 2021-09-07 DIAGNOSIS — Z79.899 ENCOUNTER FOR LONG-TERM CURRENT USE OF MEDICATION: ICD-10-CM

## 2021-09-07 DIAGNOSIS — M54.50 CHRONIC MIDLINE LOW BACK PAIN WITHOUT SCIATICA: Chronic | ICD-10-CM

## 2021-09-07 DIAGNOSIS — G89.29 CHRONIC MIDLINE LOW BACK PAIN WITHOUT SCIATICA: Chronic | ICD-10-CM

## 2021-09-07 DIAGNOSIS — M47.26 OSTEOARTHRITIS OF SPINE WITH RADICULOPATHY, LUMBAR REGION: Chronic | ICD-10-CM

## 2021-09-07 RX ORDER — HYDROCODONE BITARTRATE AND ACETAMINOPHEN 7.5; 325 MG/1; MG/1
1 TABLET ORAL EVERY 6 HOURS PRN
Qty: 120 TABLET | Refills: 0 | Status: SHIPPED | OUTPATIENT
Start: 2021-09-07 | End: 2021-10-06 | Stop reason: SDUPTHER

## 2021-09-07 NOTE — TELEPHONE ENCOUNTER
Patient seen in office every 3 months for chronic pain requiring opiate pain medication. Compliant with medication, visits with no adverse effects noted. TERRY and UDS current and appropriate. Patient called requesting scheduled refill at appropriate interval. Patient understands the risks associated with this controlled medication, including tolerance and addiction.  Patient also agrees to only obtain this medication from me, and not from a another provider, unless that provider is covering for me in my absence.  Patient also agrees to be compliant in dosing, and not self adjust the dose of medication.  A signed controlled substance agreement is on file, and the patient has received a controlled substance education sheet at this a previous visit.  The patient has also signed a consent for treatment with a controlled substance as per Psychiatric policy. TERRY was obtained.   Refill sent for hydrocodone.     This document has been electronically signed by JASMINE Noel on September 7, 2021 11:24 CDT

## 2021-09-15 ENCOUNTER — TELEPHONE (OUTPATIENT)
Dept: PHARMACY | Facility: HOSPITAL | Age: 66
End: 2021-09-15

## 2021-09-15 NOTE — TELEPHONE ENCOUNTER
Medication Adherence Call    Patient was called today to discuss medication adherence with pravastatin, as he was identified as having care opportunities.     he did not answer. I will try again at a later date.    Iman Caba PharmD  Population Health Pharmacist  09/15/21    Medication Adherence Call    Patient was called again today to discuss medication adherence with pravastatin, as he was identified as having care opportunities.     he did not answer for the second time. I will try once more at a later date.    Iman Caba PharmD  Population Health Pharmacist  09/16/21

## 2021-10-06 DIAGNOSIS — M47.26 OSTEOARTHRITIS OF SPINE WITH RADICULOPATHY, LUMBAR REGION: Chronic | ICD-10-CM

## 2021-10-06 DIAGNOSIS — Z79.899 ENCOUNTER FOR LONG-TERM CURRENT USE OF MEDICATION: ICD-10-CM

## 2021-10-06 DIAGNOSIS — G89.29 CHRONIC MIDLINE LOW BACK PAIN WITHOUT SCIATICA: Chronic | ICD-10-CM

## 2021-10-06 DIAGNOSIS — M54.50 CHRONIC MIDLINE LOW BACK PAIN WITHOUT SCIATICA: Chronic | ICD-10-CM

## 2021-10-06 DIAGNOSIS — E78.2 MIXED HYPERLIPIDEMIA: ICD-10-CM

## 2021-10-08 ENCOUNTER — TELEPHONE (OUTPATIENT)
Dept: FAMILY MEDICINE CLINIC | Facility: CLINIC | Age: 66
End: 2021-10-08

## 2021-10-08 NOTE — TELEPHONE ENCOUNTER
Patient seen in office every 3 months for chronic pain requiring opiate pain medication. Compliant with medication, visits with no adverse effects noted. TERRY and UDS current and appropriate. Patient called requesting scheduled refill at appropriate interval. Patient understands the risks associated with this controlled medication, including tolerance and addiction.  Patient also agrees to only obtain this medication from me, and not from a another provider, unless that provider is covering for me in my absence.  Patient also agrees to be compliant in dosing, and not self adjust the dose of medication.  A signed controlled substance agreement is on file, and the patient has received a controlled substance education sheet at this a previous visit.  The patient has also signed a consent for treatment with a controlled substance as per [facility] policy. TERRY was obtained.   Refill sent for hydrocodone.     This document has been electronically signed by JASMINE Noel on October 8, 2021 08:43 CDT

## 2021-10-09 RX ORDER — HYDROCODONE BITARTRATE AND ACETAMINOPHEN 7.5; 325 MG/1; MG/1
1 TABLET ORAL EVERY 6 HOURS PRN
Qty: 120 TABLET | Refills: 0 | Status: SHIPPED | OUTPATIENT
Start: 2021-10-09 | End: 2021-11-09 | Stop reason: SDUPTHER

## 2021-10-11 ENCOUNTER — OFFICE VISIT (OUTPATIENT)
Dept: FAMILY MEDICINE CLINIC | Facility: CLINIC | Age: 66
End: 2021-10-11

## 2021-10-11 ENCOUNTER — LAB (OUTPATIENT)
Dept: LAB | Facility: OTHER | Age: 66
End: 2021-10-11

## 2021-10-11 VITALS
OXYGEN SATURATION: 99 % | HEIGHT: 72 IN | SYSTOLIC BLOOD PRESSURE: 130 MMHG | DIASTOLIC BLOOD PRESSURE: 80 MMHG | HEART RATE: 74 BPM | TEMPERATURE: 98.4 F | BODY MASS INDEX: 20.36 KG/M2 | RESPIRATION RATE: 16 BRPM | WEIGHT: 150.3 LBS

## 2021-10-11 DIAGNOSIS — K85.20 ALCOHOL-INDUCED ACUTE PANCREATITIS, UNSPECIFIED COMPLICATION STATUS: ICD-10-CM

## 2021-10-11 DIAGNOSIS — D75.839 THROMBOCYTOSIS: Primary | ICD-10-CM

## 2021-10-11 DIAGNOSIS — J44.1 COPD WITH EXACERBATION (HCC): ICD-10-CM

## 2021-10-11 DIAGNOSIS — K85.20 ALCOHOL-INDUCED ACUTE PANCREATITIS, UNSPECIFIED COMPLICATION STATUS: Primary | ICD-10-CM

## 2021-10-11 LAB
ALBUMIN SERPL-MCNC: 3.9 G/DL (ref 3.5–5)
ALBUMIN/GLOB SERPL: 1.1 G/DL (ref 1.1–1.8)
ALP SERPL-CCNC: 68 U/L (ref 38–126)
ALT SERPL W P-5'-P-CCNC: 23 U/L
AMYLASE SERPL-CCNC: 140 U/L (ref 30–110)
ANION GAP SERPL CALCULATED.3IONS-SCNC: 7 MMOL/L (ref 5–15)
ANISOCYTOSIS BLD QL: ABNORMAL
AST SERPL-CCNC: 22 U/L (ref 17–59)
BILIRUB SERPL-MCNC: 0.2 MG/DL (ref 0.2–1.3)
BUN SERPL-MCNC: 11 MG/DL (ref 7–23)
BUN/CREAT SERPL: 14.1 (ref 7–25)
CALCIUM SPEC-SCNC: 9.2 MG/DL (ref 8.4–10.2)
CHLORIDE SERPL-SCNC: 96 MMOL/L (ref 101–112)
CO2 SERPL-SCNC: 30 MMOL/L (ref 22–30)
CREAT SERPL-MCNC: 0.78 MG/DL (ref 0.7–1.3)
DEPRECATED RDW RBC AUTO: 45.9 FL (ref 37–54)
EOSINOPHIL # BLD MANUAL: 0.22 10*3/MM3 (ref 0–0.4)
EOSINOPHIL NFR BLD MANUAL: 2 % (ref 0.3–6.2)
ERYTHROCYTE [DISTWIDTH] IN BLOOD BY AUTOMATED COUNT: 13.1 % (ref 12.3–15.4)
GFR SERPL CREATININE-BSD FRML MDRD: 100 ML/MIN/1.73 (ref 49–113)
GLOBULIN UR ELPH-MCNC: 3.5 GM/DL (ref 2.3–3.5)
GLUCOSE SERPL-MCNC: 116 MG/DL (ref 70–99)
HCT VFR BLD AUTO: 45.2 % (ref 37.5–51)
HGB BLD-MCNC: 15.4 G/DL (ref 13–17.7)
LARGE PLATELETS: ABNORMAL
LYMPHOCYTES # BLD MANUAL: 1.55 10*3/MM3 (ref 0.7–3.1)
LYMPHOCYTES NFR BLD MANUAL: 13 % (ref 5–12)
LYMPHOCYTES NFR BLD MANUAL: 14 % (ref 19.6–45.3)
MCH RBC QN AUTO: 32.9 PG (ref 26.6–33)
MCHC RBC AUTO-ENTMCNC: 34.1 G/DL (ref 31.5–35.7)
MCV RBC AUTO: 96.6 FL (ref 79–97)
MONOCYTES # BLD AUTO: 1.44 10*3/MM3 (ref 0.1–0.9)
NEUTROPHILS # BLD AUTO: 7.85 10*3/MM3 (ref 1.7–7)
NEUTROPHILS NFR BLD MANUAL: 71 % (ref 42.7–76)
PLATELET # BLD AUTO: 501 10*3/MM3 (ref 140–450)
PMV BLD AUTO: 8.8 FL (ref 6–12)
POTASSIUM SERPL-SCNC: 4.3 MMOL/L (ref 3.4–5)
PROT SERPL-MCNC: 7.4 G/DL (ref 6.3–8.6)
RBC # BLD AUTO: 4.68 10*6/MM3 (ref 4.14–5.8)
SMALL PLATELETS BLD QL SMEAR: ABNORMAL
SODIUM SERPL-SCNC: 133 MMOL/L (ref 137–145)
WBC # BLD AUTO: 11.05 10*3/MM3 (ref 3.4–10.8)
WBC MORPH BLD: NORMAL

## 2021-10-11 PROCEDURE — 82150 ASSAY OF AMYLASE: CPT | Performed by: NURSE PRACTITIONER

## 2021-10-11 PROCEDURE — 36415 COLL VENOUS BLD VENIPUNCTURE: CPT | Performed by: NURSE PRACTITIONER

## 2021-10-11 PROCEDURE — 80053 COMPREHEN METABOLIC PANEL: CPT | Performed by: NURSE PRACTITIONER

## 2021-10-11 PROCEDURE — 85025 COMPLETE CBC W/AUTO DIFF WBC: CPT | Performed by: NURSE PRACTITIONER

## 2021-10-11 PROCEDURE — 99214 OFFICE O/P EST MOD 30 MIN: CPT | Performed by: NURSE PRACTITIONER

## 2021-10-11 RX ORDER — ONDANSETRON 4 MG/1
TABLET, ORALLY DISINTEGRATING ORAL
COMMUNITY
Start: 2021-10-05

## 2021-10-11 RX ORDER — PREDNISONE 20 MG/1
20 TABLET ORAL 2 TIMES DAILY
Qty: 14 TABLET | Refills: 0 | Status: SHIPPED | OUTPATIENT
Start: 2021-10-11 | End: 2021-10-18

## 2021-10-11 RX ORDER — PRAVASTATIN SODIUM 20 MG
1 TABLET ORAL DAILY
COMMUNITY
Start: 2021-09-21 | End: 2021-10-29 | Stop reason: SDUPTHER

## 2021-10-11 NOTE — PROGRESS NOTES
Subjective   Derrell Bynum is a 66 y.o. male.       Chief Complaint   Patient presents with   • Hospital Follow Up Visit        History of Present Illness     Hospital records reviewed personally at this time including labs and diagnostic imaging.  Patient presents for follow-up of hospitalization 10/3 to 10/5/2021 at [facility].  He presented with nausea and vomiting which was unremittent and worsening.  He reported to ETOH consumption of 8-10 beers daily long-term.  His amylase was over eight thousand.  CT abdomen pelvis showed the following impression: One.  Swollen appearance of pancreas with heterogenicity and enlargement of the pancreatic head.  There is also pancreatic ductal dilatation during up to 4 mm in diameter.  There was mild pancreatic stranding, and pancreatitis was a possibility but pancreatic malignancy is not excludable.  MRI/MRCP is recommended for further evaluation.  Patient was admitted to the medical floor treated with n.p.o. status and IV fluids as well as Zofran and as needed Dilaudid.  General surgery Dr. Girard was consulted.  Right upper quadrant ultrasound showed findings compatible with given history of pancreatitis no cholelithiasis with mild gallbladder wall thickening.  By day of discharge his lipase was down to eleven hundred there was no abdominal tenderness and he was discharged home with orders to return to Dr. Laura's office in 2 weeks to plan for dedicated pancreatic protocol CT/MRCP to rule out underlying malignancy.  He is here today for follow-up with PCP.  Yamilet he has felt much better without nausea and vomiting since discharge. Reports eating regular diet now but has little to no appetite.     Due for repeat labs CBC CMP and amylase today    other complaints today: COPD seems a little worse despite aluterol nev and Breo inhaler use. Can hardly walk ot his garden without stopping to catch his breath, let alone doing any work outside. More SOB than  usual, not coughing up any more sputum than usual, no fever or sign of infection. He continues to smoke cigarettes daily. Advised to stop, is not willing to stop. Agreeable to trial oral steroid x 7 days.   .   Parts of most recent relevant visit HPI, ROS  and PE may be carried forward and all are updated as appropriate for current situation.    Past Surgical History:   Procedure Laterality Date   • COLONOSCOPY N/A 4/13/2021    Procedure: COLONOSCOPY;  Surgeon: Killian Melendez MD;  Location: [facility] ENDOSCOPY;  Service: Gastroenterology;  Laterality: N/A;   • CYST REMOVAL      neck   • ENDOSCOPY N/A 4/13/2021    Procedure: ESOPHAGOGASTRODUODENOSCOPY;  Surgeon: Killian Melendez MD;  Location: [facility] ENDOSCOPY;  Service: Gastroenterology;  Laterality: N/A;   • HERNIA REPAIR      Hernia repair w/mesh (1)      • INJECTION OF MEDICATION  03/26/2014    Depo Medrol (Methylprednisone) 80mg (1)      • INJECTION OF MEDICATION  03/26/2014    Dexamethasone (1)      • INJECTION OF MEDICATION  06/04/2013    Kenalog (1)    • INJECTION OF MEDICATION  03/26/2014    Rocephin (1)      • OTHER SURGICAL HISTORY  03/26/2014    Nebulizer Treatment 19660 (1)         Social History     Socioeconomic History   • Marital status:    Tobacco Use   • Smoking status: Current Every Day Smoker     Packs/day: 1.00     Years: 35.00     Pack years: 35.00     Types: Cigarettes   • Smokeless tobacco: Never Used   • Tobacco comment: 20-39 cigarettes/day (5/9/16)   Vaping Use   • Vaping Use: Never used   Substance and Sexual Activity   • Alcohol use: Yes     Comment: Beer - 8 drinks a day. A  typical drinking day (5/9/16)   • Drug use: Yes     Types: Hydrocodone   • Sexual activity: Yes     Partners: Female      The following portions of the patient's history were reviewed and updated as appropriate: allergies, current medications, past family history, past medical history, past social history, past surgical history and problem list.    Review of  Systems   Constitutional: Negative.  Negative for activity change, appetite change, chills, fever and unexpected weight loss.   HENT: Negative.  Negative for ear pain, postnasal drip, rhinorrhea, sinus pressure, sneezing, sore throat, swollen glands, trouble swallowing and voice change.    Eyes: Negative.  Negative for discharge, redness, itching and visual disturbance.   Respiratory: Negative.  Negative for cough, shortness of breath and wheezing.    Cardiovascular: Negative.  Negative for chest pain and leg swelling.   Gastrointestinal: Negative.    Endocrine: Negative.  Negative for polydipsia, polyphagia and polyuria.   Genitourinary: Negative.  Negative for dysuria.   Musculoskeletal: Positive for back pain. Negative for arthralgias.   Skin: Negative.    Allergic/Immunologic: Negative.    Neurological: Negative.    Hematological: Negative.    Psychiatric/Behavioral: Negative.  Negative for behavioral problems, dysphoric mood, sleep disturbance, suicidal ideas and depressed mood. The patient is not nervous/anxious.    All other systems reviewed and are negative.    PHQ-9 Depression Screening  Little interest or pleasure in doing things?     Feeling down, depressed, or hopeless?     Trouble falling or staying asleep, or sleeping too much?     Feeling tired or having little energy?     Poor appetite or overeating?     Feeling bad about yourself - or that you are a failure or have let yourself or your family down?     Trouble concentrating on things, such as reading the newspaper or watching television?     Moving or speaking so slowly that other people could have noticed? Or the opposite - being so fidgety or restless that you have been moving around a lot more than usual?     Thoughts that you would be better off dead, or of hurting yourself in some way?     PHQ-9 Total Score     If you checked off any problems, how difficult have these problems made it for you to do your work, take care of things at home, or get  "along with other people?      Patient understands the risks associated with this controlled medication, including tolerance and addiction.  Patient also agrees to only obtain this medication from me, and not from a another provider, unless that provider is covering for me in my absence.  Patient also agrees to be compliant in dosing, and not self adjust the dose of medication.  A signed controlled substance agreement is on file, and the patient has received a controlled substance education sheet at this a previous visit.  The patient has also signed a consent for treatment with a controlled substance as per [facility] policy. TERRY was obtained.    Objective    Vitals:    10/11/21 1028   BP: 130/80   Pulse: 74   Resp: 16   Temp: 98.4 °F (36.9 °C)   SpO2: 99%   Weight: 68.2 kg (150 lb 4.8 oz)   Height: 182.9 cm (72\")   PainSc: 0-No pain     Body mass index is 20.38 kg/m².    Physical Exam  Vitals and nursing note reviewed.   Constitutional:       General: He is not in acute distress.     Appearance: Normal appearance. He is well-developed. He is not ill-appearing or diaphoretic.   HENT:      Head: Normocephalic and atraumatic.   Eyes:      General: No scleral icterus.        Right eye: No discharge.         Left eye: No discharge.      Conjunctiva/sclera: Conjunctivae normal.      Pupils: Pupils are equal, round, and reactive to light.   Neck:      Thyroid: No thyromegaly.      Vascular: No carotid bruit or JVD.      Trachea: No tracheal deviation.   Cardiovascular:      Rate and Rhythm: Normal rate and regular rhythm.      Pulses: Normal pulses.      Heart sounds: Normal heart sounds. No murmur heard.  No friction rub. No gallop.    Pulmonary:      Effort: Pulmonary effort is normal. No respiratory distress.      Breath sounds: Normal breath sounds. No stridor. No wheezing, rhonchi or rales.   Chest:      Chest wall: No tenderness.   Abdominal:      General: Bowel sounds are normal.      Palpations: Abdomen is " soft.   Musculoskeletal:         General: No tenderness or deformity. Normal range of motion.      Cervical back: Normal range of motion and neck supple. No rigidity or tenderness.      Right lower leg: No edema.      Left lower leg: No edema.   Lymphadenopathy:      Cervical: No cervical adenopathy.   Skin:     General: Skin is warm and dry.      Capillary Refill: Capillary refill takes 2 to 3 seconds.      Coloration: Skin is not jaundiced or pale.      Findings: No bruising, erythema, lesion or rash.   Neurological:      General: No focal deficit present.      Mental Status: He is alert and oriented to person, place, and time. Mental status is at baseline.      Motor: No weakness.      Gait: Gait normal.   Psychiatric:         Mood and Affect: Mood normal.         Behavior: Behavior normal.         Thought Content: Thought content normal.         Judgment: Judgment normal.     improved acute pancreatitis, no abd pain or n/v, just low appetite but able to eat normally.  Due for repeat CBC, CMP and amylase today.   Mild copd exacerbation, no sign of infection, short course of oral steroid prescribed.      Assessment/Plan   Diagnoses and all orders for this visit:    1. Alcohol-induced acute pancreatitis, unspecified complication status (Primary)  -     CBC w AUTO Differential; Future  -     Comprehensive metabolic panel; Future  -     Amylase; Future    2. COPD with exacerbation (HCC)  -     predniSONE (DELTASONE) 20 MG tablet; Take 1 tablet by mouth 2 (Two) Times a Day for 7 days.  Dispense: 14 tablet; Refill: 0        Return in about 2 weeks (around 10/25/2021), or if symptoms worsen or fail to improve.             This document has been electronically signed by JASMINE Noel on October 11, 2021 11:16 CDT

## 2021-10-16 ENCOUNTER — CLINICAL SUPPORT (OUTPATIENT)
Dept: FAMILY MEDICINE CLINIC | Facility: CLINIC | Age: 66
End: 2021-10-16

## 2021-10-16 DIAGNOSIS — Z23 NEED FOR INFLUENZA VACCINATION: ICD-10-CM

## 2021-10-16 DIAGNOSIS — Z23 NEED FOR VACCINATION FOR H FLU TYPE B: Primary | ICD-10-CM

## 2021-10-16 PROCEDURE — G0008 ADMIN INFLUENZA VIRUS VAC: HCPCS | Performed by: INTERNAL MEDICINE

## 2021-10-16 PROCEDURE — 90686 IIV4 VACC NO PRSV 0.5 ML IM: CPT | Performed by: INTERNAL MEDICINE

## 2021-10-29 RX ORDER — PRAVASTATIN SODIUM 20 MG
20 TABLET ORAL DAILY
Qty: 30 TABLET | Refills: 3 | Status: SHIPPED | OUTPATIENT
Start: 2021-10-29 | End: 2022-02-04 | Stop reason: SDUPTHER

## 2021-11-09 ENCOUNTER — OFFICE VISIT (OUTPATIENT)
Dept: FAMILY MEDICINE CLINIC | Facility: CLINIC | Age: 66
End: 2021-11-09

## 2021-11-09 ENCOUNTER — LAB (OUTPATIENT)
Dept: LAB | Facility: OTHER | Age: 66
End: 2021-11-09

## 2021-11-09 VITALS
WEIGHT: 155.1 LBS | BODY MASS INDEX: 21.01 KG/M2 | OXYGEN SATURATION: 99 % | TEMPERATURE: 97.7 F | RESPIRATION RATE: 16 BRPM | SYSTOLIC BLOOD PRESSURE: 132 MMHG | DIASTOLIC BLOOD PRESSURE: 84 MMHG | HEART RATE: 70 BPM | HEIGHT: 72 IN

## 2021-11-09 DIAGNOSIS — M47.26 OSTEOARTHRITIS OF SPINE WITH RADICULOPATHY, LUMBAR REGION: Chronic | ICD-10-CM

## 2021-11-09 DIAGNOSIS — Z79.899 ENCOUNTER FOR LONG-TERM CURRENT USE OF MEDICATION: ICD-10-CM

## 2021-11-09 DIAGNOSIS — M54.50 CHRONIC MIDLINE LOW BACK PAIN WITHOUT SCIATICA: Chronic | ICD-10-CM

## 2021-11-09 DIAGNOSIS — Z00.00 MEDICARE ANNUAL WELLNESS VISIT, SUBSEQUENT: ICD-10-CM

## 2021-11-09 DIAGNOSIS — Z51.81 ENCOUNTER FOR THERAPEUTIC DRUG LEVEL MONITORING: Primary | ICD-10-CM

## 2021-11-09 DIAGNOSIS — K85.00 IDIOPATHIC ACUTE PANCREATITIS, UNSPECIFIED COMPLICATION STATUS: ICD-10-CM

## 2021-11-09 DIAGNOSIS — E78.2 MIXED HYPERLIPIDEMIA: ICD-10-CM

## 2021-11-09 DIAGNOSIS — G89.29 CHRONIC MIDLINE LOW BACK PAIN WITHOUT SCIATICA: Chronic | ICD-10-CM

## 2021-11-09 LAB
AMYLASE SERPL-CCNC: 115 U/L (ref 30–110)
LIPASE SERPL-CCNC: 113 U/L (ref 13–60)

## 2021-11-09 PROCEDURE — 1159F MED LIST DOCD IN RCRD: CPT | Performed by: NURSE PRACTITIONER

## 2021-11-09 PROCEDURE — G0439 PPPS, SUBSEQ VISIT: HCPCS | Performed by: NURSE PRACTITIONER

## 2021-11-09 PROCEDURE — 96160 PT-FOCUSED HLTH RISK ASSMT: CPT | Performed by: NURSE PRACTITIONER

## 2021-11-09 PROCEDURE — 99214 OFFICE O/P EST MOD 30 MIN: CPT | Performed by: NURSE PRACTITIONER

## 2021-11-09 PROCEDURE — 82150 ASSAY OF AMYLASE: CPT | Performed by: NURSE PRACTITIONER

## 2021-11-09 PROCEDURE — 83690 ASSAY OF LIPASE: CPT | Performed by: NURSE PRACTITIONER

## 2021-11-09 PROCEDURE — 1170F FXNL STATUS ASSESSED: CPT | Performed by: NURSE PRACTITIONER

## 2021-11-09 PROCEDURE — 36415 COLL VENOUS BLD VENIPUNCTURE: CPT | Performed by: NURSE PRACTITIONER

## 2021-11-09 PROCEDURE — 1125F AMNT PAIN NOTED PAIN PRSNT: CPT | Performed by: NURSE PRACTITIONER

## 2021-11-09 RX ORDER — HYDROCODONE BITARTRATE AND ACETAMINOPHEN 7.5; 325 MG/1; MG/1
1 TABLET ORAL EVERY 6 HOURS PRN
Qty: 120 TABLET | Refills: 0 | Status: SHIPPED | OUTPATIENT
Start: 2021-11-09 | End: 2021-12-05 | Stop reason: SDUPTHER

## 2021-11-09 RX ORDER — HYDROCODONE BITARTRATE AND ACETAMINOPHEN 7.5; 325 MG/1; MG/1
1 TABLET ORAL EVERY 6 HOURS PRN
Qty: 120 TABLET | Refills: 0 | Status: CANCELLED | OUTPATIENT
Start: 2021-11-09

## 2021-11-09 NOTE — PROGRESS NOTES
The ABCs of the Annual Wellness Visit  Subsequent Medicare Wellness Visit    Chief Complaint   Patient presents with   • Medicare Wellness-subsequent   • chronic lower back pain      Subjective    History of Present Illness:  Derrell Bynum is a 66 y.o. male who presents for a Subsequent Medicare Wellness Visit.    Also seen for routine follow up of chronic lower back pain today, see that note for HPI, ROS and PE documentation.    The following portions of the patient's history were reviewed and   updated as appropriate: allergies, current medications, past family history, past medical history, past social history, past surgical history and problem list.    Compared to one year ago, the patient feels his physical   health is worse.    Compared to one year ago, the patient feels his mental   health is the same.    Recent Hospitalizations:  He was admitted within the past 365 days at [facility].       Current Medical Providers:  Patient Care Team:  Lou Hinds APRN as PCP - General (Family Medicine)    Outpatient Medications Prior to Visit   Medication Sig Dispense Refill   • albuterol (PROVENTIL) (2.5 MG/3ML) 0.083% nebulizer solution Take 2.5 mg by nebulization Every 4 (Four) Hours As Needed for wheezing (Neb tx q6hrs prn cough, congestion, wheezing).     • albuterol sulfate HFA (VENTOLIN HFA) 108 (90 Base) MCG/ACT inhaler Inhale 2 puffs Every 6 (Six) Hours As Needed for Wheezing. 1 inhaler 11   • amLODIPine (NORVASC) 10 MG tablet Take 1 tablet by mouth Daily. 90 tablet 1   • Ascorbic Acid (VITAMIN C) 500 MG capsule      • aspirin 81 MG chewable tablet Chew 81 mg Daily.     • CloNIDine (CATAPRES) 0.1 MG tablet Take 1 tablet by mouth 2 (Two) Times a Day. If needed for B/P 150/90 or higher. 60 tablet 5   • fluticasone (FLONASE) 50 MCG/ACT nasal spray 1 spray into each nostril 2 (Two) Times a Day.     • Fluticasone Furoate-Vilanterol (BREO ELLIPTA) 100-25 MCG/INH inhaler Inhale 1 puff Daily. 1 each 5    • metoprolol succinate XL (Toprol XL) 25 MG 24 hr tablet Take 1 tablet by mouth Daily. 90 tablet 1   • montelukast (SINGULAIR) 10 MG tablet Take 1 tablet by mouth Every Night. 90 tablet 1   • ondansetron ODT (ZOFRAN-ODT) 4 MG disintegrating tablet      • pantoprazole (PROTONIX) 40 MG EC tablet Take 1 tablet by mouth Every Morning. 30 tablet 5   • pravastatin (PRAVACHOL) 20 MG tablet Take 1 tablet by mouth Daily. 30 tablet 3   • tamsulosin (FLOMAX) 0.4 MG capsule 24 hr capsule TAKE 1 CAPSULE BY MOUTH DAILY. 30 capsule 11   • tiZANidine (ZANAFLEX) 2 MG tablet Take 1 tablet by mouth Every 8 (Eight) Hours As Needed for Muscle Spasms. 90 tablet 5   • HYDROcodone-acetaminophen (Lortab) 7.5-325 MG per tablet Take 1 tablet by mouth Every 6 (Six) Hours As Needed for Moderate Pain . Fill when due 120 tablet 0     No facility-administered medications prior to visit.       Opioid medication/s are on active medication list.  and I have evaluated his active treatment plan and pain score trends (see table).  Vitals:    11/09/21 0813   PainSc:   7   PainLoc: Back     I have reviewed the chart for potential of high risk medication and harmful drug interactions in the elderly.            Aspirin is on active medication list. Aspirin use is indicated based on review of current medical condition/s. Pros and cons of this therapy have been discussed today. Benefits of this medication outweigh potential harm.  Patient has been encouraged to continue taking this medication.  .      Patient Active Problem List   Diagnosis   • Coalworker's pneumoconiosis (HCC)   • COPD (chronic obstructive pulmonary disease) (HCC)   • Essential hypertension   • Polycythemia   • Muscle spasm   • Osteoarthritis of spine   • Encounter for long-term current use of medication   • Chronic midline low back pain without sciatica   • Encounter for long-term use of opiate analgesic   • Smoking   • Abnormal x-ray of lumbar spine   • Mixed hyperlipidemia   • Chronic  "nonseasonal allergic rhinitis due to pollen   • Epigastric pain   • Screen for colon cancer   • Alcohol-induced acute pancreatitis     Advance Care Planning  Advance Directive is not on file.  ACP discussion was held with the patient during this visit. Patient does not have an advance directive, declines further assistance.          Objective      Also seen for routine follow up of chronic lower back pain today, see that note for HPI, ROS and PE documentation.  Vitals:    11/09/21 0813   BP: 132/84   BP Location: Left arm   Patient Position: Sitting   Cuff Size: Adult   Pulse: 70   Resp: 16   Temp: 97.7 °F (36.5 °C)   SpO2: 99%   Weight: 70.4 kg (155 lb 1.6 oz)   Height: 182.9 cm (72\")   PainSc:   7   PainLoc: Back     BMI Readings from Last 1 Encounters:   11/09/21 21.04 kg/m²   BMI is within normal parameters. No follow-up required.    Does the patient have evidence of cognitive impairment? No    Physical Exam     Also seen for routine follow up of chronic lower back pain today, see that note for HPI, ROS and PE documentation.    Lab Results   Component Value Date    TRIG 44 10/04/2021    HGBA1C 5.5 10/04/2021            HEALTH RISK ASSESSMENT    Smoking Status:  Social History     Tobacco Use   Smoking Status Current Every Day Smoker   • Packs/day: 1.00   • Years: 35.00   • Pack years: 35.00   • Types: Cigarettes   Smokeless Tobacco Never Used   Tobacco Comment    20-39 cigarettes/day (5/9/16)     Alcohol Consumption:  Social History     Substance and Sexual Activity   Alcohol Use Yes    Comment: Beer - 8 drinks a day. A  typical drinking day (5/9/16)     Fall Risk Screen:    STEADI Fall Risk Assessment has not been completed.    Depression Screening:  PHQ-2/PHQ-9 Depression Screening 11/9/2021   Little interest or pleasure in doing things 0   Feeling down, depressed, or hopeless 0   Trouble falling or staying asleep, or sleeping too much -   Feeling tired or having little energy -   Poor appetite or overeating - "   Feeling bad about yourself - or that you are a failure or have let yourself or your family down -   Trouble concentrating on things, such as reading the newspaper or watching television -   Moving or speaking so slowly that other people could have noticed. Or the opposite - being so fidgety or restless that you have been moving around a lot more than usual -   Thoughts that you would be better off dead, or of hurting yourself in some way -   Total Score 0       Health Habits and Functional and Cognitive Screening:  Functional & Cognitive Status 11/9/2021   Do you have difficulty preparing food and eating? No   Do you have difficulty bathing yourself, getting dressed or grooming yourself? No   Do you have difficulty using the toilet? No   Do you have difficulty moving around from place to place? No   Do you have trouble with steps or getting out of a bed or a chair? No   Current Diet Well Balanced Diet   Dental Exam Up to date   Eye Exam Up to date        Eye Exam Comment -   Exercise (times per week) 5 times per week   Current Exercises Include Walking   Current Exercise Activities Include -   Do you need help using the phone?  No   Are you deaf or do you have serious difficulty hearing?  No   Do you need help with transportation? No   Do you need help shopping? No   Do you need help preparing meals?  No   Do you need help with housework?  No   Do you need help with laundry? No   Do you need help taking your medications? No   Do you need help managing money? No   Do you ever drive or ride in a car without wearing a seat belt? No   Have you felt unusual stress, anger or loneliness in the last month? No   Who do you live with? Spouse   If you need help, do you have trouble finding someone available to you? No   Have you been bothered in the last four weeks by sexual problems? No   Do you have difficulty concentrating, remembering or making decisions? No       Age-appropriate Screening Schedule:  Refer to the list  below for future screening recommendations based on patient's age, sex and/or medical conditions. Orders for these recommended tests are listed in the plan section. The patient has been provided with a written plan.    Health Maintenance   Topic Date Due   • ZOSTER VACCINE (2 of 2) 11/09/2021 (Originally 7/17/2017)   • LIPID PANEL  07/19/2022   • TDAP/TD VACCINES (2 - Td or Tdap) 05/22/2027   • INFLUENZA VACCINE  Completed              Assessment/Plan   CMS Preventative Services Quick Reference  Risk Factors Identified During Encounter  Obesity/Overweight    .[email or web address]    The above risks/problems have been discussed with the patient.  Follow up actions/plans if indicated are seen below in the Assessment/Plan Section.  Pertinent information has been shared with the patient in the After Visit Summary.    Diagnoses and all orders for this visit:    1. Encounter for therapeutic drug level monitoring (Primary)  -     ToxASSURE Select 13 Discrete -; Future    2. Chronic midline low back pain without sciatica  Comments:  controlled to stated satisfaction today. able to garden and work as needed, and sleep without waking due to pain.   Orders:  -     HYDROcodone-acetaminophen (Lortab) 7.5-325 MG per tablet; Take 1 tablet by mouth Every 6 (Six) Hours As Needed for Moderate Pain . Fill when due  Dispense: 120 tablet; Refill: 0    3. Encounter for long-term current use of medication  -     HYDROcodone-acetaminophen (Lortab) 7.5-325 MG per tablet; Take 1 tablet by mouth Every 6 (Six) Hours As Needed for Moderate Pain . Fill when due  Dispense: 120 tablet; Refill: 0    4. Osteoarthritis of spine with radiculopathy, lumbar region  -     HYDROcodone-acetaminophen (Lortab) 7.5-325 MG per tablet; Take 1 tablet by mouth Every 6 (Six) Hours As Needed for Moderate Pain . Fill when due  Dispense: 120 tablet; Refill: 0    5. Mixed hyperlipidemia  -     HYDROcodone-acetaminophen (Lortab) 7.5-325 MG per tablet; Take 1 tablet by mouth  Every 6 (Six) Hours As Needed for Moderate Pain . Fill when due  Dispense: 120 tablet; Refill: 0    6. Medicare annual wellness visit, subsequent    7. Idiopathic acute pancreatitis, unspecified complication status  -     Amylase  -     Lipase        Follow Up:   Return in about 1 year (around 11/9/2022) for  Medicare Wellness: 12 weeks for FU chronic lower back pain. about 1/31/21.,.     An After Visit Summary and PPPS were made available to the patient.

## 2021-11-09 NOTE — PROGRESS NOTES
Subjective   Derrell Bynum is a 66 y.o. male.       Chief Complaint   Patient presents with   • Medicare Wellness-subsequent   • chronic lower back pain        History of Present Illness   Primary purpose of visit today is Medicare Annual wellness visit, subsequent. This is performed. Also here for routine follow up of chronic lower back pain requiring hydrocodone for control. No medication change required today.     Chronic lower back pain secondary to OA with DDD lumbar spine. Initial onset over 1 year ago. Gradually worsening over time. Stable today. Due for refill hydrocodone, which he reports allows full participation in ADLS and sleep uninterrupted by pain. Compliant with use.   Most recent imaging of the lumbar spine is lumbar MRI dated 12/6/2018.  Impression: 1.  Left lateral disc herniation at L3-4 resulting in severe neural foraminal stenosis and effacement of the exiting nerve root on the left at this level.  2.  Mild central canal stenosis at L4-L5 secondary to a generalized disc bulge, facet hypertrophy and ligamentum flavum hypertrophy.  3.  Severe neuroforaminal stenosis on the right secondary to a mixed disc protrusion and overlying spur resulting in effacement of the exiting nerve root.  4.  Moderate neuroforaminal stenosis on the left at L4-L5 and bilaterally at L5-S1 with contact of the exiting nerve roots but no significant nerve root effacement.    Also he missed his hospital follow up with Dr Wiley last week re: recent admission for acute pancreatitis and for results of his CT abd pancreatic protocol of 10/25/21. He reports no nausea/ vomiting or abd pain since last visit here. This CT is reviewed at this time.The CT shows resolution of fullness of head of pancreas and both pancreatic ducts are now visible. Advised while it appears he has recovered fully, he is best served by following up with Dr Wiley to be sure nothing is missed. Will recheck amylase and lipase today.     Other  complaints today: None.  Parts of most recent relevant visit HPI, ROS  and PE may be carried forward and all are updated as appropriate for current situation.    Past Surgical History:   Procedure Laterality Date   • COLONOSCOPY N/A 4/13/2021    Procedure: COLONOSCOPY;  Surgeon: Killian Melendez MD;  Location: [facility] ENDOSCOPY;  Service: Gastroenterology;  Laterality: N/A;   • CYST REMOVAL      neck   • ENDOSCOPY N/A 4/13/2021    Procedure: ESOPHAGOGASTRODUODENOSCOPY;  Surgeon: Killian Melendez MD;  Location: [facility] ENDOSCOPY;  Service: Gastroenterology;  Laterality: N/A;   • HERNIA REPAIR      Hernia repair w/mesh (1)      • INJECTION OF MEDICATION  03/26/2014    Depo Medrol (Methylprednisone) 80mg (1)      • INJECTION OF MEDICATION  03/26/2014    Dexamethasone (1)      • INJECTION OF MEDICATION  06/04/2013    Kenalog (1)    • INJECTION OF MEDICATION  03/26/2014    Rocephin (1)      • OTHER SURGICAL HISTORY  03/26/2014    Nebulizer Treatment 92889 (1)         Social History     Socioeconomic History   • Marital status:    Tobacco Use   • Smoking status: Current Every Day Smoker     Packs/day: 1.00     Years: 35.00     Pack years: 35.00     Types: Cigarettes   • Smokeless tobacco: Never Used   • Tobacco comment: 20-39 cigarettes/day (5/9/16)   Vaping Use   • Vaping Use: Never used   Substance and Sexual Activity   • Alcohol use: Yes     Comment: Beer - 8 drinks a day. A  typical drinking day (5/9/16)   • Drug use: Yes     Types: Hydrocodone   • Sexual activity: Yes     Partners: Female      The following portions of the patient's history were reviewed and updated as appropriate: allergies, current medications, past family history, past medical history, past social history, past surgical history and problem list.    Review of Systems   Constitutional: Negative.  Negative for activity change, appetite change, chills, fever and unexpected weight loss.   HENT: Negative.  Negative for ear pain, postnasal drip,  rhinorrhea, sinus pressure, sneezing, sore throat, swollen glands, trouble swallowing and voice change.    Eyes: Negative.  Negative for discharge, redness, itching and visual disturbance.   Respiratory: Negative.  Negative for cough, shortness of breath, wheezing and stridor.    Cardiovascular: Negative.  Negative for chest pain, palpitations and leg swelling.   Gastrointestinal: Negative.    Endocrine: Negative.  Negative for polydipsia, polyphagia and polyuria.   Genitourinary: Negative.  Negative for dysuria.   Musculoskeletal: Positive for back pain. Negative for arthralgias.   Skin: Negative.    Allergic/Immunologic: Negative.    Neurological: Negative.    Hematological: Negative.    Psychiatric/Behavioral: Negative.  Negative for behavioral problems, dysphoric mood, sleep disturbance, suicidal ideas and depressed mood. The patient is not nervous/anxious.    All other systems reviewed and are negative.    PHQ-9 Depression Screening  Little interest or pleasure in doing things? 0   Feeling down, depressed, or hopeless? 0   Trouble falling or staying asleep, or sleeping too much?     Feeling tired or having little energy?     Poor appetite or overeating?     Feeling bad about yourself - or that you are a failure or have let yourself or your family down?     Trouble concentrating on things, such as reading the newspaper or watching television?     Moving or speaking so slowly that other people could have noticed? Or the opposite - being so fidgety or restless that you have been moving around a lot more than usual?     Thoughts that you would be better off dead, or of hurting yourself in some way?     PHQ-9 Total Score 0   If you checked off any problems, how difficult have these problems made it for you to do your work, take care of things at home, or get along with other people?      Patient understands the risks associated with this controlled medication, including tolerance and addiction.  Patient also agrees  "to only obtain this medication from me, and not from a another provider, unless that provider is covering for me in my absence.  Patient also agrees to be compliant in dosing, and not self adjust the dose of medication.  A signed controlled substance agreement is on file, and the patient has received a controlled substance education sheet at this a previous visit.  The patient has also signed a consent for treatment with a controlled substance as per [facility] policy. TERRY was obtained.    Objective    Vitals:    11/09/21 0813   BP: 132/84   BP Location: Left arm   Patient Position: Sitting   Cuff Size: Adult   Pulse: 70   Resp: 16   Temp: 97.7 °F (36.5 °C)   SpO2: 99%   Weight: 70.4 kg (155 lb 1.6 oz)   Height: 182.9 cm (72\")   PainSc:   7   PainLoc: Back     Body mass index is 21.04 kg/m².    Physical Exam  Vitals and nursing note reviewed.   Constitutional:       General: He is not in acute distress.     Appearance: Normal appearance. He is well-developed. He is not ill-appearing or diaphoretic.   HENT:      Head: Normocephalic and atraumatic.   Eyes:      General: No scleral icterus.        Right eye: No discharge.         Left eye: No discharge.      Conjunctiva/sclera: Conjunctivae normal.      Pupils: Pupils are equal, round, and reactive to light.   Neck:      Thyroid: No thyromegaly.      Vascular: No carotid bruit or JVD.      Trachea: No tracheal deviation.   Cardiovascular:      Rate and Rhythm: Normal rate and regular rhythm.      Pulses: Normal pulses.      Heart sounds: Normal heart sounds. No murmur heard.  No friction rub. No gallop.    Pulmonary:      Effort: Pulmonary effort is normal. No respiratory distress.      Breath sounds: Normal breath sounds. No stridor. No wheezing, rhonchi or rales.   Chest:      Chest wall: No tenderness.   Abdominal:      General: Bowel sounds are normal.      Palpations: Abdomen is soft.   Musculoskeletal:         General: No deformity. Normal range of motion. "      Cervical back: Normal range of motion and neck supple. No rigidity or tenderness.      Lumbar back: Tenderness present.        Back:       Right lower leg: No edema.      Left lower leg: No edema.   Lymphadenopathy:      Cervical: No cervical adenopathy.   Skin:     General: Skin is warm and dry.      Capillary Refill: Capillary refill takes 2 to 3 seconds.      Coloration: Skin is not jaundiced or pale.      Findings: No bruising, erythema, lesion or rash.   Neurological:      General: No focal deficit present.      Mental Status: He is alert and oriented to person, place, and time. Mental status is at baseline.      Motor: No weakness.      Gait: Gait normal.   Psychiatric:         Mood and Affect: Mood normal.         Behavior: Behavior normal.         Thought Content: Thought content normal.         Judgment: Judgment normal.     Stable chronic lower back pain refill hydrocodone today.  Resolution of symptoms of acute pancreatitis and CT abd pancreatic protocol of 10/25/21 reviewed. This was ordered by Dr Wiley and he needs to follow up with him. Appears to be completely resolved. Recheck amylase and lipase which were still elevated at last visit here, but improving.    Assessment/Plan   Diagnoses and all orders for this visit:    1. Encounter for therapeutic drug level monitoring (Primary)  -     ToxASSURE Select 13 Discrete -; Future    2. Chronic midline low back pain without sciatica  Comments:  controlled to stated satisfaction today. able to garden and work as needed, and sleep without waking due to pain.   Orders:  -     HYDROcodone-acetaminophen (Lortab) 7.5-325 MG per tablet; Take 1 tablet by mouth Every 6 (Six) Hours As Needed for Moderate Pain . Fill when due  Dispense: 120 tablet; Refill: 0    3. Encounter for long-term current use of medication  -     HYDROcodone-acetaminophen (Lortab) 7.5-325 MG per tablet; Take 1 tablet by mouth Every 6 (Six) Hours As Needed for Moderate Pain . Fill when  due  Dispense: 120 tablet; Refill: 0    4. Osteoarthritis of spine with radiculopathy, lumbar region  -     HYDROcodone-acetaminophen (Lortab) 7.5-325 MG per tablet; Take 1 tablet by mouth Every 6 (Six) Hours As Needed for Moderate Pain . Fill when due  Dispense: 120 tablet; Refill: 0    5. Mixed hyperlipidemia  -     HYDROcodone-acetaminophen (Lortab) 7.5-325 MG per tablet; Take 1 tablet by mouth Every 6 (Six) Hours As Needed for Moderate Pain . Fill when due  Dispense: 120 tablet; Refill: 0    6. Medicare annual wellness visit, subsequent    7. Idiopathic acute pancreatitis, unspecified complication status  -     Amylase  -     Lipase        Return in about 1 year (around 11/9/2022) for  Medicare Wellness: 12 weeks for FU chronic lower back pain. about 1/31/21.,.             This document has been electronically signed by JASMINE Noel on November 9, 2021 08:46 CST

## 2021-11-16 DIAGNOSIS — J30.89 CHRONIC NONSEASONAL ALLERGIC RHINITIS DUE TO POLLEN: ICD-10-CM

## 2021-11-18 RX ORDER — MONTELUKAST SODIUM 10 MG/1
10 TABLET ORAL NIGHTLY
Qty: 90 TABLET | Refills: 1 | Status: SHIPPED | OUTPATIENT
Start: 2021-11-18 | End: 2022-06-19 | Stop reason: SDUPTHER

## 2021-11-30 DIAGNOSIS — I10 ESSENTIAL HYPERTENSION: ICD-10-CM

## 2021-11-30 RX ORDER — METOPROLOL SUCCINATE 25 MG/1
25 TABLET, EXTENDED RELEASE ORAL DAILY
Qty: 90 TABLET | Refills: 1 | Status: SHIPPED | OUTPATIENT
Start: 2021-11-30 | End: 2022-01-31 | Stop reason: DRUGHIGH

## 2021-12-05 DIAGNOSIS — Z79.899 ENCOUNTER FOR LONG-TERM CURRENT USE OF MEDICATION: ICD-10-CM

## 2021-12-05 DIAGNOSIS — M54.50 CHRONIC MIDLINE LOW BACK PAIN WITHOUT SCIATICA: Chronic | ICD-10-CM

## 2021-12-05 DIAGNOSIS — M47.26 OSTEOARTHRITIS OF SPINE WITH RADICULOPATHY, LUMBAR REGION: Chronic | ICD-10-CM

## 2021-12-05 DIAGNOSIS — E78.2 MIXED HYPERLIPIDEMIA: ICD-10-CM

## 2021-12-05 DIAGNOSIS — G89.29 CHRONIC MIDLINE LOW BACK PAIN WITHOUT SCIATICA: Chronic | ICD-10-CM

## 2021-12-06 ENCOUNTER — TELEPHONE (OUTPATIENT)
Dept: FAMILY MEDICINE CLINIC | Facility: CLINIC | Age: 66
End: 2021-12-06

## 2021-12-06 RX ORDER — HYDROCODONE BITARTRATE AND ACETAMINOPHEN 7.5; 325 MG/1; MG/1
1 TABLET ORAL EVERY 6 HOURS PRN
Qty: 120 TABLET | Refills: 0 | Status: SHIPPED | OUTPATIENT
Start: 2021-12-06 | End: 2022-01-04 | Stop reason: SDUPTHER

## 2021-12-07 NOTE — TELEPHONE ENCOUNTER
Patient seen in office every 3 months for chronic pain requiring opiate pain medication. Compliant with medication, visits with no adverse effects noted. TERRY and UDS current and appropriate. Patient called requesting scheduled refill at appropriate interval. Patient understands the risks associated with this controlled medication, including tolerance and addiction.  Patient also agrees to only obtain this medication from me, and not from a another provider, unless that provider is covering for me in my absence.  Patient also agrees to be compliant in dosing, and not self adjust the dose of medication.  A signed controlled substance agreement is on file, and the patient has received a controlled substance education sheet at this a previous visit.  The patient has also signed a consent for treatment with a controlled substance as per [facility] policy. TERRY was obtained.   Refill sent for hydrocodone.     This document has been electronically signed by JASMINE Noel on December 6, 2021 19:35 CST

## 2021-12-21 DIAGNOSIS — K21.00 GASTROESOPHAGEAL REFLUX DISEASE WITH ESOPHAGITIS WITHOUT HEMORRHAGE: ICD-10-CM

## 2021-12-21 DIAGNOSIS — I10 ESSENTIAL HYPERTENSION: ICD-10-CM

## 2021-12-21 RX ORDER — AMLODIPINE BESYLATE 10 MG/1
10 TABLET ORAL DAILY
Qty: 90 TABLET | Refills: 1 | Status: SHIPPED | OUTPATIENT
Start: 2021-12-21 | End: 2022-06-19 | Stop reason: SDUPTHER

## 2021-12-21 RX ORDER — PANTOPRAZOLE SODIUM 40 MG/1
40 TABLET, DELAYED RELEASE ORAL EVERY MORNING
Qty: 30 TABLET | Refills: 5 | Status: SHIPPED | OUTPATIENT
Start: 2021-12-21 | End: 2022-08-16

## 2021-12-21 RX ORDER — PANTOPRAZOLE SODIUM 40 MG/1
40 TABLET, DELAYED RELEASE ORAL EVERY MORNING
Qty: 30 TABLET | Refills: 5 | Status: SHIPPED | OUTPATIENT
Start: 2021-12-21 | End: 2022-01-17 | Stop reason: SDUPTHER

## 2022-01-04 DIAGNOSIS — M47.26 OSTEOARTHRITIS OF SPINE WITH RADICULOPATHY, LUMBAR REGION: Chronic | ICD-10-CM

## 2022-01-04 DIAGNOSIS — Z79.899 ENCOUNTER FOR LONG-TERM CURRENT USE OF MEDICATION: ICD-10-CM

## 2022-01-04 DIAGNOSIS — E78.2 MIXED HYPERLIPIDEMIA: ICD-10-CM

## 2022-01-04 DIAGNOSIS — M54.50 CHRONIC MIDLINE LOW BACK PAIN WITHOUT SCIATICA: Chronic | ICD-10-CM

## 2022-01-04 DIAGNOSIS — G89.29 CHRONIC MIDLINE LOW BACK PAIN WITHOUT SCIATICA: Chronic | ICD-10-CM

## 2022-01-05 ENCOUNTER — TELEPHONE (OUTPATIENT)
Dept: FAMILY MEDICINE CLINIC | Facility: CLINIC | Age: 67
End: 2022-01-05

## 2022-01-05 RX ORDER — HYDROCODONE BITARTRATE AND ACETAMINOPHEN 7.5; 325 MG/1; MG/1
1 TABLET ORAL EVERY 6 HOURS PRN
Qty: 120 TABLET | Refills: 0 | Status: SHIPPED | OUTPATIENT
Start: 2022-01-05 | End: 2022-01-31 | Stop reason: SDUPTHER

## 2022-01-06 NOTE — TELEPHONE ENCOUNTER
Patient seen in office every 3 months for chronic pain requiring opiate pain medication. Compliant with medication, visits with no adverse effects noted. TERRY and UDS current and appropriate. Patient called requesting scheduled refill at appropriate interval. Patient understands the risks associated with this controlled medication, including tolerance and addiction.  Patient also agrees to only obtain this medication from me, and not from a another provider, unless that provider is covering for me in my absence.  Patient also agrees to be compliant in dosing, and not self adjust the dose of medication.  A signed controlled substance agreement is on file, and the patient has received a controlled substance education sheet at this a previous visit.  The patient has also signed a consent for treatment with a controlled substance as per [facility] policy. TERRY was obtained.   Refill sent for hydrocodone.     This document has been electronically signed by JASMINE Noel on January 5, 2022 19:22 CST

## 2022-01-17 DIAGNOSIS — K21.00 GASTROESOPHAGEAL REFLUX DISEASE WITH ESOPHAGITIS WITHOUT HEMORRHAGE: ICD-10-CM

## 2022-01-18 RX ORDER — PANTOPRAZOLE SODIUM 40 MG/1
40 TABLET, DELAYED RELEASE ORAL EVERY MORNING
Qty: 30 TABLET | Refills: 5 | Status: SHIPPED | OUTPATIENT
Start: 2022-01-18 | End: 2022-07-05 | Stop reason: SDUPTHER

## 2022-01-31 ENCOUNTER — LAB (OUTPATIENT)
Dept: LAB | Facility: OTHER | Age: 67
End: 2022-01-31

## 2022-01-31 ENCOUNTER — OFFICE VISIT (OUTPATIENT)
Dept: FAMILY MEDICINE CLINIC | Facility: CLINIC | Age: 67
End: 2022-01-31

## 2022-01-31 VITALS
HEART RATE: 72 BPM | OXYGEN SATURATION: 99 % | WEIGHT: 152.6 LBS | RESPIRATION RATE: 16 BRPM | SYSTOLIC BLOOD PRESSURE: 144 MMHG | DIASTOLIC BLOOD PRESSURE: 82 MMHG | HEIGHT: 72 IN | BODY MASS INDEX: 20.67 KG/M2

## 2022-01-31 DIAGNOSIS — K21.00 GASTROESOPHAGEAL REFLUX DISEASE WITH ESOPHAGITIS WITHOUT HEMORRHAGE: Chronic | ICD-10-CM

## 2022-01-31 DIAGNOSIS — M54.50 CHRONIC MIDLINE LOW BACK PAIN WITHOUT SCIATICA: Primary | Chronic | ICD-10-CM

## 2022-01-31 DIAGNOSIS — G89.29 CHRONIC MIDLINE LOW BACK PAIN WITHOUT SCIATICA: Primary | Chronic | ICD-10-CM

## 2022-01-31 DIAGNOSIS — N13.8 BPH WITH OBSTRUCTION/LOWER URINARY TRACT SYMPTOMS: Chronic | ICD-10-CM

## 2022-01-31 DIAGNOSIS — J44.1 COPD WITH EXACERBATION: Chronic | ICD-10-CM

## 2022-01-31 DIAGNOSIS — Z79.899 ENCOUNTER FOR LONG-TERM CURRENT USE OF MEDICATION: ICD-10-CM

## 2022-01-31 DIAGNOSIS — J30.89 CHRONIC NONSEASONAL ALLERGIC RHINITIS DUE TO POLLEN: Chronic | ICD-10-CM

## 2022-01-31 DIAGNOSIS — I10 ESSENTIAL HYPERTENSION: Chronic | ICD-10-CM

## 2022-01-31 DIAGNOSIS — E78.2 MIXED HYPERLIPIDEMIA: Chronic | ICD-10-CM

## 2022-01-31 DIAGNOSIS — Z51.81 ENCOUNTER FOR THERAPEUTIC DRUG LEVEL MONITORING: ICD-10-CM

## 2022-01-31 DIAGNOSIS — N40.1 BPH WITH OBSTRUCTION/LOWER URINARY TRACT SYMPTOMS: Chronic | ICD-10-CM

## 2022-01-31 DIAGNOSIS — M47.26 OSTEOARTHRITIS OF SPINE WITH RADICULOPATHY, LUMBAR REGION: Chronic | ICD-10-CM

## 2022-01-31 PROCEDURE — G0481 DRUG TEST DEF 8-14 CLASSES: HCPCS | Performed by: NURSE PRACTITIONER

## 2022-01-31 PROCEDURE — 99214 OFFICE O/P EST MOD 30 MIN: CPT | Performed by: NURSE PRACTITIONER

## 2022-01-31 PROCEDURE — 80307 DRUG TEST PRSMV CHEM ANLYZR: CPT | Performed by: NURSE PRACTITIONER

## 2022-01-31 RX ORDER — METOPROLOL SUCCINATE 50 MG/1
50 TABLET, EXTENDED RELEASE ORAL DAILY
Qty: 90 TABLET | Refills: 1 | Status: SHIPPED | OUTPATIENT
Start: 2022-01-31 | End: 2022-02-27 | Stop reason: SDUPTHER

## 2022-01-31 RX ORDER — HYDROCODONE BITARTRATE AND ACETAMINOPHEN 7.5; 325 MG/1; MG/1
1 TABLET ORAL EVERY 6 HOURS PRN
Qty: 120 TABLET | Refills: 0 | Status: SHIPPED | OUTPATIENT
Start: 2022-01-31 | End: 2022-03-06 | Stop reason: SDUPTHER

## 2022-01-31 NOTE — PROGRESS NOTES
Subjective   Derrell Bynum is a 67 y.o. male.       Chief Complaint   Patient presents with   • Hypertension     12 week f/u         History of Present Illness   Chronic lower back pain stable. Onset more than 1 year ago. Reports current dose hydrocodone relieves pain adequately to allow usual activities without distress.  Refill hydrocodone due soon, sent today for fill when due. UDS pending, collected today. TERRY appropriate. Compliant with usel.  Stable HLD due for lipid in late April prior to next visit, continue pravastatin.  HTN not at goal in office today, mild elevation. Increase Toprol to 50mg daily, continue amlodipine 10mg daily. Due for CBC,CMP at next visit.  Stable, asymptomatic GERD with current pantopraxol.  Stable chronic allergic rhinitis with usual watery rhinorrhea daily dsepite montelukast and flonase, no sign of infection and not in current exacerbation. Reports uses montelukast and flonase only when symptoms flare.  BPH stable,reports 1-2 voidings per night. Has been off flomax for 2-3 nights reports feels like voids more often at night when he takes it.   OK to stop request refill if hesitancy or nocturia returns.    Past Surgical History:   Procedure Laterality Date   • COLONOSCOPY N/A 4/13/2021    Procedure: COLONOSCOPY;  Surgeon: Killian Melendez MD;  Location: [facility] ENDOSCOPY;  Service: Gastroenterology;  Laterality: N/A;   • CYST REMOVAL      neck   • ENDOSCOPY N/A 4/13/2021    Procedure: ESOPHAGOGASTRODUODENOSCOPY;  Surgeon: Killian Melendez MD;  Location: [facility] ENDOSCOPY;  Service: Gastroenterology;  Laterality: N/A;   • HERNIA REPAIR      Hernia repair w/mesh (1)      • INJECTION OF MEDICATION  03/26/2014    Depo Medrol (Methylprednisone) 80mg (1)      • INJECTION OF MEDICATION  03/26/2014    Dexamethasone (1)      • INJECTION OF MEDICATION  06/04/2013    Kenalog (1)    • INJECTION OF MEDICATION  03/26/2014    Rocephin (1)      • OTHER SURGICAL HISTORY  03/26/2014    Nebulizer  Treatment 55919 (1)         Social History     Socioeconomic History   • Marital status:    Tobacco Use   • Smoking status: Current Every Day Smoker     Packs/day: 1.00     Years: 35.00     Pack years: 35.00     Types: Cigarettes   • Smokeless tobacco: Never Used   • Tobacco comment: 20-39 cigarettes/day (5/9/16)   Vaping Use   • Vaping Use: Never used   Substance and Sexual Activity   • Alcohol use: Yes     Comment: Beer - 8 drinks a day. A  typical drinking day (5/9/16)   • Drug use: Yes     Types: Hydrocodone   • Sexual activity: Yes     Partners: Female      The following portions of the patient's history were reviewed and updated as appropriate: allergies, current medications, past family history, past medical history, past social history, past surgical history and problem list.    Review of Systems   Constitutional: Negative.  Negative for activity change, appetite change, chills, fever and unexpected weight loss.   HENT: Negative.  Negative for ear pain, postnasal drip, rhinorrhea, sinus pressure, sneezing, sore throat, swollen glands, trouble swallowing and voice change.    Eyes: Negative.  Negative for discharge, redness, itching and visual disturbance.   Respiratory: Negative.  Negative for cough, shortness of breath, wheezing and stridor.    Cardiovascular: Negative.  Negative for chest pain, palpitations and leg swelling.   Gastrointestinal: Negative.    Endocrine: Negative.  Negative for polydipsia, polyphagia and polyuria.   Genitourinary: Negative.  Negative for dysuria.   Musculoskeletal: Positive for back pain. Negative for arthralgias.   Skin: Negative.    Allergic/Immunologic: Negative.    Neurological: Negative.    Hematological: Negative.    Psychiatric/Behavioral: Negative.  Negative for behavioral problems, dysphoric mood, sleep disturbance, suicidal ideas and depressed mood. The patient is not nervous/anxious.    All other systems reviewed and are negative.    PHQ-9 Depression  "Screening  Little interest or pleasure in doing things? 0   Feeling down, depressed, or hopeless? 0   Trouble falling or staying asleep, or sleeping too much?     Feeling tired or having little energy?     Poor appetite or overeating?     Feeling bad about yourself - or that you are a failure or have let yourself or your family down?     Trouble concentrating on things, such as reading the newspaper or watching television?     Moving or speaking so slowly that other people could have noticed? Or the opposite - being so fidgety or restless that you have been moving around a lot more than usual?     Thoughts that you would be better off dead, or of hurting yourself in some way?     PHQ-9 Total Score 0   If you checked off any problems, how difficult have these problems made it for you to do your work, take care of things at home, or get along with other people?      Patient understands the risks associated with this controlled medication, including tolerance and addiction.  Patient also agrees to only obtain this medication from me, and not from a another provider, unless that provider is covering for me in my absence.  Patient also agrees to be compliant in dosing, and not self adjust the dose of medication.  A signed controlled substance agreement is on file, and the patient has received a controlled substance education sheet at this a previous visit.  The patient has also signed a consent for treatment with a controlled substance as per [facility] policy. TERRY was obtained.    Objective    Vitals:    01/31/22 0817   BP: 144/82   BP Location: Left arm   Patient Position: Sitting   Cuff Size: Adult   Pulse: 72   Resp: 16   SpO2: 99%   Weight: 69.2 kg (152 lb 9.6 oz)   Height: 182.9 cm (72\")   PainSc:   6   PainLoc: Back     Body mass index is 20.7 kg/m².    Physical Exam  Vitals and nursing note reviewed.   Constitutional:       General: He is not in acute distress.     Appearance: Normal appearance. He is " well-developed and normal weight. He is not ill-appearing or diaphoretic.   HENT:      Head: Normocephalic and atraumatic.   Eyes:      General: No scleral icterus.        Right eye: No discharge.         Left eye: No discharge.      Conjunctiva/sclera: Conjunctivae normal.      Pupils: Pupils are equal, round, and reactive to light.   Neck:      Thyroid: No thyromegaly.      Vascular: No carotid bruit or JVD.      Trachea: No tracheal deviation.   Cardiovascular:      Rate and Rhythm: Normal rate and regular rhythm.      Pulses: Normal pulses.      Heart sounds: Normal heart sounds. No murmur heard.  No friction rub. No gallop.    Pulmonary:      Effort: Pulmonary effort is normal. No respiratory distress.      Breath sounds: Normal breath sounds. No stridor. No wheezing, rhonchi or rales.   Chest:      Chest wall: No tenderness.   Abdominal:      General: Bowel sounds are normal.      Palpations: Abdomen is soft.   Musculoskeletal:         General: No tenderness or deformity. Normal range of motion.      Cervical back: Normal range of motion and neck supple. No rigidity or tenderness.      Right lower leg: No edema.      Left lower leg: No edema.   Lymphadenopathy:      Cervical: No cervical adenopathy.   Skin:     General: Skin is warm and dry.      Capillary Refill: Capillary refill takes 2 to 3 seconds.      Coloration: Skin is not jaundiced or pale.      Findings: No bruising, erythema, lesion or rash.   Neurological:      General: No focal deficit present.      Mental Status: He is alert and oriented to person, place, and time. Mental status is at baseline.      Motor: No weakness.      Gait: Gait normal.   Psychiatric:         Mood and Affect: Mood normal.         Behavior: Behavior normal.         Thought Content: Thought content normal.         Judgment: Judgment normal.           Assessment/Plan   Diagnoses and all orders for this visit:    1. Chronic midline low back pain without sciatica  "(Primary)  Comments:  controlled to stated satisfaction today. able to garden and work as needed, and sleep without waking due to pain. refill hydrocodone today.  Orders:  -     HYDROcodone-acetaminophen (Lortab) 7.5-325 MG per tablet; Take 1 tablet by mouth Every 6 (Six) Hours As Needed for Moderate Pain . Fill when due  Dispense: 120 tablet; Refill: 0    2. Encounter for long-term current use of medication  -     HYDROcodone-acetaminophen (Lortab) 7.5-325 MG per tablet; Take 1 tablet by mouth Every 6 (Six) Hours As Needed for Moderate Pain . Fill when due  Dispense: 120 tablet; Refill: 0    3. Osteoarthritis of spine with radiculopathy, lumbar region  Comments:  stable, refill hydrocodone today for associated pain.  Orders:  -     HYDROcodone-acetaminophen (Lortab) 7.5-325 MG per tablet; Take 1 tablet by mouth Every 6 (Six) Hours As Needed for Moderate Pain . Fill when due  Dispense: 120 tablet; Refill: 0    4. Mixed hyperlipidemia  Comments:  stable. due for repeat lipid before next visit in late April. conitnue pravastatin.   Orders:  -     HYDROcodone-acetaminophen (Lortab) 7.5-325 MG per tablet; Take 1 tablet by mouth Every 6 (Six) Hours As Needed for Moderate Pain . Fill when due  Dispense: 120 tablet; Refill: 0  -     Lipid Panel    5. Essential hypertension  Comments:  mild elevation in office today, not at goal /82 HR 72. increased Toprol XL to 50mg daily today 1/31/22. continue amlodipine 10mg daily.   Orders:  -     CBC & Differential  -     Comprehensive Metabolic Panel  -     metoprolol succinate XL (Toprol XL) 50 MG 24 hr tablet; Take 1 tablet by mouth Daily. Dose increased on 1/31/22. For high blood pressure  Dispense: 90 tablet; Refill: 1    6. Chronic nonseasonal allergic rhinitis due to pollen  Comments:  stable, seasonal flares, reports using montelukast and flonase PRN flare. no current exacerbation;.    7. COPD with exacerbation (HCC)  Comments:  reports breathing is \"good\" no current nor " recent exacerbation. continue current albuterol inhaler, Breo inhaler and prn albuterol nebs.    8. Gastroesophageal reflux disease with esophagitis without hemorrhage  Comments:  symptoms suppressed with daily pantoprazole.    9. BPH with obstruction/lower urinary tract symptoms  Comments:  feels symptoms well suppressed with med, but reports fewer noctural voidings required when ran out of med past 3 days. wants to try doing without it.        Return in about 12 weeks (around 4/25/2022).             This document has been electronically signed by JASMINE Noel on January 31, 2022 08:38 CST

## 2022-02-04 LAB
6MAM UR QL CFM: NEGATIVE
6MAM/CREAT UR: NOT DETECTED NG/MG CREAT
7AMINOCLONAZEPAM/CREAT UR: NOT DETECTED NG/MG CREAT
A-OH ALPRAZ/CREAT UR: NOT DETECTED NG/MG CREAT
A-OH-TRIAZOLAM/CREAT UR CFM: NOT DETECTED NG/MG CREAT
ALFENTANIL/CREAT UR CFM: NOT DETECTED NG/MG CREAT
ALPHA-HYDROXYMIDAZOLAM, URINE: NOT DETECTED NG/MG CREAT
ALPRAZ/CREAT UR CFM: NOT DETECTED NG/MG CREAT
AMOBARBITAL UR QL CFM: NOT DETECTED
AMPHET/CREAT UR: NOT DETECTED NG/MG CREAT
AMPHETAMINES UR QL CFM: NEGATIVE
BARBITAL UR QL CFM: NOT DETECTED
BARBITURATES UR QL CFM: NEGATIVE
BENZODIAZ UR QL CFM: NEGATIVE
BUPRENORPHINE UR QL CFM: NEGATIVE
BUPRENORPHINE/CREAT UR: NOT DETECTED NG/MG CREAT
BUTABARBITAL UR QL CFM: NOT DETECTED
BUTALBITAL UR QL CFM: NOT DETECTED
BZE/CREAT UR: NOT DETECTED NG/MG CREAT
CANNABINOIDS UR QL CFM: NEGATIVE
CARBOXYTHC/CREAT UR: NOT DETECTED NG/MG CREAT
CLONAZEPAM/CREAT UR CFM: NOT DETECTED NG/MG CREAT
COCAETHYLENE/CREAT UR CFM: NOT DETECTED NG/MG CREAT
COCAINE UR QL CFM: NEGATIVE
COCAINE/CREAT UR CFM: NOT DETECTED NG/MG CREAT
CODEINE/CREAT UR: NOT DETECTED NG/MG CREAT
CREAT UR-MCNC: 29 MG/DL
DESALKYLFLURAZ/CREAT UR: NOT DETECTED NG/MG CREAT
DESMETHYLFLUNITRAZEPAM: NOT DETECTED NG/MG CREAT
DHC/CREAT UR: 248 NG/MG CREAT
DIAZEPAM/CREAT UR: NOT DETECTED NG/MG CREAT
DRUGS UR: NORMAL
EDDP/CREAT UR: NOT DETECTED NG/MG CREAT
ETHANOL UR CFM-MCNC: NOT DETECTED G/DL
ETHANOL UR QL CFM: NEGATIVE
FENTANYL UR QL CFM: NEGATIVE
FENTANYL/CREAT UR: NOT DETECTED NG/MG CREAT
FLUNITRAZEPAM UR QL CFM: NOT DETECTED NG/MG CREAT
HYDROCODONE/CREAT UR: 1700 NG/MG CREAT
HYDROMORPHONE/CREAT UR: 445 NG/MG CREAT
LEVEL OF DETECTION:: NORMAL
LORAZEPAM/CREAT UR: NOT DETECTED NG/MG CREAT
MDA/CREAT UR: NOT DETECTED NG/MG CREAT
MDMA/CREAT UR: NOT DETECTED NG/MG CREAT
MEPHOBARBITAL UR QL CFM: NOT DETECTED
METHADONE UR QL CFM: NEGATIVE
METHADONE/CREAT UR: NOT DETECTED NG/MG CREAT
METHAMPHET/CREAT UR: NOT DETECTED NG/MG CREAT
MIDAZOLAM/CREAT UR CFM: NOT DETECTED NG/MG CREAT
MORPHINE/CREAT UR: NOT DETECTED NG/MG CREAT
N-NORTRAMADOL/CREAT UR CFM: NOT DETECTED NG/MG CREAT
NARCOTICS UR: NEGATIVE
NORBUPRENORPHINE/CREAT UR: NOT DETECTED NG/MG CREAT
NORCODEINE/CREAT UR CFM: NOT DETECTED NG/MG CREAT
NORDIAZEPAM/CREAT UR: NOT DETECTED NG/MG CREAT
NORFENTANYL/CREAT UR: NOT DETECTED NG/MG CREAT
NORHYDROCODONE/CREAT UR: 748 NG/MG CREAT
NORMORPHINE UR-MCNC: NOT DETECTED NG/MG CREAT
NOROXYCODONE/CREAT UR: NOT DETECTED NG/MG CREAT
NOROXYMORPHONE/CREAT UR CFM: NOT DETECTED NG/MG CREAT
O-NORTRAMADOL UR CFM-MCNC: NOT DETECTED NG/MG CREAT
OPIATES UR QL CFM: NORMAL
OXAZEPAM/CREAT UR: NOT DETECTED NG/MG CREAT
OXYCODONE UR QL CFM: NEGATIVE
OXYCODONE/CREAT UR: NOT DETECTED NG/MG CREAT
OXYMORPHONE/CREAT UR: NOT DETECTED NG/MG CREAT
PENTOBARB UR QL CFM: NOT DETECTED
PHENOBARB UR QL CFM: NOT DETECTED
SECOBARBITAL UR QL CFM: NOT DETECTED
SUFENTANIL/CREAT UR CFM: NOT DETECTED NG/MG CREAT
TAPENTADOL UR QL CFM: NEGATIVE
TAPENTADOL/CREAT UR: NOT DETECTED NG/MG CREAT
TEMAZEPAM/CREAT UR: NOT DETECTED NG/MG CREAT
THIOPENTAL UR QL CFM: NOT DETECTED
TRAMADOL UR QL CFM: NOT DETECTED NG/MG CREAT

## 2022-02-07 RX ORDER — PRAVASTATIN SODIUM 20 MG
20 TABLET ORAL DAILY
Qty: 30 TABLET | Refills: 3 | Status: SHIPPED | OUTPATIENT
Start: 2022-02-07 | End: 2022-03-06 | Stop reason: SDUPTHER

## 2022-02-27 DIAGNOSIS — I10 ESSENTIAL HYPERTENSION: Chronic | ICD-10-CM

## 2022-02-28 RX ORDER — METOPROLOL SUCCINATE 50 MG/1
50 TABLET, EXTENDED RELEASE ORAL DAILY
Qty: 90 TABLET | Refills: 1 | Status: SHIPPED | OUTPATIENT
Start: 2022-02-28 | End: 2022-08-16 | Stop reason: SDUPTHER

## 2022-03-06 DIAGNOSIS — G89.29 CHRONIC MIDLINE LOW BACK PAIN WITHOUT SCIATICA: Chronic | ICD-10-CM

## 2022-03-06 DIAGNOSIS — Z79.899 ENCOUNTER FOR LONG-TERM CURRENT USE OF MEDICATION: ICD-10-CM

## 2022-03-06 DIAGNOSIS — E78.2 MIXED HYPERLIPIDEMIA: Chronic | ICD-10-CM

## 2022-03-06 DIAGNOSIS — M47.26 OSTEOARTHRITIS OF SPINE WITH RADICULOPATHY, LUMBAR REGION: Chronic | ICD-10-CM

## 2022-03-06 DIAGNOSIS — M54.50 CHRONIC MIDLINE LOW BACK PAIN WITHOUT SCIATICA: Chronic | ICD-10-CM

## 2022-03-07 ENCOUNTER — TELEPHONE (OUTPATIENT)
Dept: FAMILY MEDICINE CLINIC | Facility: CLINIC | Age: 67
End: 2022-03-07

## 2022-03-07 RX ORDER — HYDROCODONE BITARTRATE AND ACETAMINOPHEN 7.5; 325 MG/1; MG/1
1 TABLET ORAL EVERY 6 HOURS PRN
Qty: 120 TABLET | Refills: 0 | Status: SHIPPED | OUTPATIENT
Start: 2022-03-07 | End: 2022-04-05 | Stop reason: SDUPTHER

## 2022-03-07 RX ORDER — PRAVASTATIN SODIUM 20 MG
20 TABLET ORAL DAILY
Qty: 30 TABLET | Refills: 3 | Status: SHIPPED | OUTPATIENT
Start: 2022-03-07 | End: 2022-08-16 | Stop reason: SDUPTHER

## 2022-03-07 NOTE — TELEPHONE ENCOUNTER
Patient seen in office every 3 months for chronic pain requiring opiate pain medication. Compliant with medication, visits with no adverse effects noted. TERRY and UDS current and appropriate. Patient called requesting scheduled refill at appropriate interval. Patient understands the risks associated with this controlled medication, including tolerance and addiction.  Patient also agrees to only obtain this medication from me, and not from a another provider, unless that provider is covering for me in my absence.  Patient also agrees to be compliant in dosing, and not self adjust the dose of medication.  A signed controlled substance agreement is on file, and the patient has received a controlled substance education sheet at this a previous visit.  The patient has also signed a consent for treatment with a controlled substance as per [facility] policy. TERRY was obtained.   Refill sent for hydrocodone.     This document has been electronically signed by JASMINE Noel on March 7, 2022 13:08 CST

## 2022-04-05 ENCOUNTER — TELEPHONE (OUTPATIENT)
Dept: FAMILY MEDICINE CLINIC | Facility: CLINIC | Age: 67
End: 2022-04-05

## 2022-04-05 DIAGNOSIS — E78.2 MIXED HYPERLIPIDEMIA: Chronic | ICD-10-CM

## 2022-04-05 DIAGNOSIS — M54.50 CHRONIC MIDLINE LOW BACK PAIN WITHOUT SCIATICA: Chronic | ICD-10-CM

## 2022-04-05 DIAGNOSIS — G89.29 CHRONIC MIDLINE LOW BACK PAIN WITHOUT SCIATICA: Chronic | ICD-10-CM

## 2022-04-05 DIAGNOSIS — M47.26 OSTEOARTHRITIS OF SPINE WITH RADICULOPATHY, LUMBAR REGION: Chronic | ICD-10-CM

## 2022-04-05 DIAGNOSIS — Z79.899 ENCOUNTER FOR LONG-TERM CURRENT USE OF MEDICATION: ICD-10-CM

## 2022-04-05 RX ORDER — HYDROCODONE BITARTRATE AND ACETAMINOPHEN 7.5; 325 MG/1; MG/1
1 TABLET ORAL EVERY 6 HOURS PRN
Qty: 120 TABLET | Refills: 0 | Status: SHIPPED | OUTPATIENT
Start: 2022-04-05 | End: 2022-04-25 | Stop reason: SDUPTHER

## 2022-04-05 NOTE — TELEPHONE ENCOUNTER
Patient seen in office every 3 months for chronic pain requiring opiate pain medication. Compliant with medication, visits with no adverse effects noted. TERRY and UDS current and appropriate. Patient called requesting scheduled refill at appropriate interval. Patient understands the risks associated with this controlled medication, including tolerance and addiction.  Patient also agrees to only obtain this medication from me, and not from a another provider, unless that provider is covering for me in my absence.  Patient also agrees to be compliant in dosing, and not self adjust the dose of medication.  A signed controlled substance agreement is on file, and the patient has received a controlled substance education sheet at this a previous visit.  The patient has also signed a consent for treatment with a controlled substance as per [facility] policy. TERRY was obtained.   Refill sent for hydrocodone.     This document has been electronically signed by JASMINE Noel on April 5, 2022 13:18 CDT

## 2022-04-22 ENCOUNTER — LAB (OUTPATIENT)
Dept: LAB | Facility: OTHER | Age: 67
End: 2022-04-22

## 2022-04-22 DIAGNOSIS — D75.839 THROMBOCYTOSIS: ICD-10-CM

## 2022-04-22 LAB
ALBUMIN SERPL-MCNC: 4.2 G/DL (ref 3.5–5)
ALBUMIN/GLOB SERPL: 1.2 G/DL (ref 1.1–1.8)
ALP SERPL-CCNC: 79 U/L (ref 38–126)
ALT SERPL W P-5'-P-CCNC: 28 U/L
ANION GAP SERPL CALCULATED.3IONS-SCNC: 6 MMOL/L (ref 5–15)
AST SERPL-CCNC: 46 U/L (ref 17–59)
BASOPHILS # BLD AUTO: 0.03 10*3/MM3 (ref 0–0.2)
BASOPHILS NFR BLD AUTO: 0.4 % (ref 0–1.5)
BILIRUB SERPL-MCNC: 0.6 MG/DL (ref 0.2–1.3)
BUN SERPL-MCNC: 7 MG/DL (ref 7–23)
BUN/CREAT SERPL: 9.2 (ref 7–25)
CALCIUM SPEC-SCNC: 9.2 MG/DL (ref 8.4–10.2)
CHLORIDE SERPL-SCNC: 93 MMOL/L (ref 101–112)
CHOLEST SERPL-MCNC: 175 MG/DL (ref 150–200)
CO2 SERPL-SCNC: 30 MMOL/L (ref 22–30)
CREAT SERPL-MCNC: 0.76 MG/DL (ref 0.7–1.3)
DEPRECATED RDW RBC AUTO: 46.4 FL (ref 37–54)
EGFRCR SERPLBLD CKD-EPI 2021: 99.7 ML/MIN/1.73
EOSINOPHIL # BLD AUTO: 0.27 10*3/MM3 (ref 0–0.4)
EOSINOPHIL NFR BLD AUTO: 3.5 % (ref 0.3–6.2)
ERYTHROCYTE [DISTWIDTH] IN BLOOD BY AUTOMATED COUNT: 13.9 % (ref 12.3–15.4)
GLOBULIN UR ELPH-MCNC: 3.4 GM/DL (ref 2.3–3.5)
GLUCOSE SERPL-MCNC: 120 MG/DL (ref 70–99)
HCT VFR BLD AUTO: 49.5 % (ref 37.5–51)
HDLC SERPL-MCNC: 101 MG/DL (ref 40–59)
HGB BLD-MCNC: 17.6 G/DL (ref 13–17.7)
LDLC SERPL CALC-MCNC: 62 MG/DL
LDLC/HDLC SERPL: 0.61 {RATIO} (ref 0–3.55)
LYMPHOCYTES # BLD AUTO: 1.09 10*3/MM3 (ref 0.7–3.1)
LYMPHOCYTES NFR BLD AUTO: 14.1 % (ref 19.6–45.3)
MCH RBC QN AUTO: 32.7 PG (ref 26.6–33)
MCHC RBC AUTO-ENTMCNC: 35.6 G/DL (ref 31.5–35.7)
MCV RBC AUTO: 92 FL (ref 79–97)
MONOCYTES # BLD AUTO: 1 10*3/MM3 (ref 0.1–0.9)
MONOCYTES NFR BLD AUTO: 12.9 % (ref 5–12)
NEUTROPHILS NFR BLD AUTO: 5.34 10*3/MM3 (ref 1.7–7)
NEUTROPHILS NFR BLD AUTO: 69.1 % (ref 42.7–76)
PLATELET # BLD AUTO: 293 10*3/MM3 (ref 140–450)
PMV BLD AUTO: 9 FL (ref 6–12)
POTASSIUM SERPL-SCNC: 5 MMOL/L (ref 3.4–5)
PROT SERPL-MCNC: 7.6 G/DL (ref 6.3–8.6)
RBC # BLD AUTO: 5.38 10*6/MM3 (ref 4.14–5.8)
SODIUM SERPL-SCNC: 129 MMOL/L (ref 137–145)
TRIGL SERPL-MCNC: 60 MG/DL
VLDLC SERPL-MCNC: 12 MG/DL (ref 5–40)
WBC NRBC COR # BLD: 7.73 10*3/MM3 (ref 3.4–10.8)

## 2022-04-22 PROCEDURE — 85025 COMPLETE CBC W/AUTO DIFF WBC: CPT | Performed by: NURSE PRACTITIONER

## 2022-04-22 PROCEDURE — 36415 COLL VENOUS BLD VENIPUNCTURE: CPT | Performed by: NURSE PRACTITIONER

## 2022-04-22 PROCEDURE — 80053 COMPREHEN METABOLIC PANEL: CPT | Performed by: NURSE PRACTITIONER

## 2022-04-22 PROCEDURE — 80061 LIPID PANEL: CPT | Performed by: NURSE PRACTITIONER

## 2022-04-25 ENCOUNTER — LAB (OUTPATIENT)
Dept: LAB | Facility: OTHER | Age: 67
End: 2022-04-25

## 2022-04-25 ENCOUNTER — OFFICE VISIT (OUTPATIENT)
Dept: FAMILY MEDICINE CLINIC | Facility: CLINIC | Age: 67
End: 2022-04-25

## 2022-04-25 VITALS
HEIGHT: 72 IN | TEMPERATURE: 98.7 F | HEART RATE: 68 BPM | DIASTOLIC BLOOD PRESSURE: 80 MMHG | OXYGEN SATURATION: 98 % | WEIGHT: 153.6 LBS | BODY MASS INDEX: 20.8 KG/M2 | SYSTOLIC BLOOD PRESSURE: 140 MMHG | RESPIRATION RATE: 16 BRPM

## 2022-04-25 DIAGNOSIS — E78.2 MIXED HYPERLIPIDEMIA: Chronic | ICD-10-CM

## 2022-04-25 DIAGNOSIS — I10 ESSENTIAL HYPERTENSION: ICD-10-CM

## 2022-04-25 DIAGNOSIS — J06.9 ACUTE URI: ICD-10-CM

## 2022-04-25 DIAGNOSIS — R79.89 ABNORMAL CBC MEASUREMENT: ICD-10-CM

## 2022-04-25 DIAGNOSIS — R73.9 HYPERGLYCEMIA: Primary | ICD-10-CM

## 2022-04-25 DIAGNOSIS — R53.82 CHRONIC FATIGUE, UNSPECIFIED: ICD-10-CM

## 2022-04-25 DIAGNOSIS — J44.1 COPD WITH EXACERBATION: ICD-10-CM

## 2022-04-25 DIAGNOSIS — M47.26 OSTEOARTHRITIS OF SPINE WITH RADICULOPATHY, LUMBAR REGION: Chronic | ICD-10-CM

## 2022-04-25 DIAGNOSIS — M54.50 CHRONIC MIDLINE LOW BACK PAIN WITHOUT SCIATICA: Chronic | ICD-10-CM

## 2022-04-25 DIAGNOSIS — G89.29 CHRONIC MIDLINE LOW BACK PAIN WITHOUT SCIATICA: Chronic | ICD-10-CM

## 2022-04-25 DIAGNOSIS — Z79.899 ENCOUNTER FOR LONG-TERM CURRENT USE OF MEDICATION: ICD-10-CM

## 2022-04-25 LAB — HBA1C MFR BLD: 5.4 % (ref 4.8–5.6)

## 2022-04-25 PROCEDURE — 82746 ASSAY OF FOLIC ACID SERUM: CPT | Performed by: NURSE PRACTITIONER

## 2022-04-25 PROCEDURE — 99214 OFFICE O/P EST MOD 30 MIN: CPT | Performed by: NURSE PRACTITIONER

## 2022-04-25 PROCEDURE — 83036 HEMOGLOBIN GLYCOSYLATED A1C: CPT | Performed by: NURSE PRACTITIONER

## 2022-04-25 PROCEDURE — 83527 ASSAY OF INSULIN: CPT | Performed by: NURSE PRACTITIONER

## 2022-04-25 PROCEDURE — 83525 ASSAY OF INSULIN: CPT | Performed by: NURSE PRACTITIONER

## 2022-04-25 PROCEDURE — 82607 VITAMIN B-12: CPT | Performed by: NURSE PRACTITIONER

## 2022-04-25 PROCEDURE — 36415 COLL VENOUS BLD VENIPUNCTURE: CPT | Performed by: NURSE PRACTITIONER

## 2022-04-25 RX ORDER — PREDNISONE 20 MG/1
20 TABLET ORAL 2 TIMES DAILY
Qty: 14 TABLET | Refills: 0 | Status: SHIPPED | OUTPATIENT
Start: 2022-04-25 | End: 2022-05-02

## 2022-04-25 RX ORDER — AZITHROMYCIN 250 MG/1
TABLET, FILM COATED ORAL
Qty: 6 TABLET | Refills: 0 | Status: SHIPPED | OUTPATIENT
Start: 2022-04-25 | End: 2022-05-16

## 2022-04-25 RX ORDER — LISINOPRIL 10 MG/1
10 TABLET ORAL DAILY
Qty: 30 TABLET | Refills: 0 | Status: SHIPPED | OUTPATIENT
Start: 2022-04-25 | End: 2022-05-16 | Stop reason: SDUPTHER

## 2022-04-25 RX ORDER — HYDROCODONE BITARTRATE AND ACETAMINOPHEN 7.5; 325 MG/1; MG/1
1 TABLET ORAL EVERY 6 HOURS PRN
Qty: 120 TABLET | Refills: 0 | Status: SHIPPED | OUTPATIENT
Start: 2022-04-25 | End: 2022-06-05 | Stop reason: SDUPTHER

## 2022-04-25 NOTE — PROGRESS NOTES
Subjective   Derrell Bynum is a 67 y.o. male.     has Coalworker's pneumoconiosis (HCC); COPD (chronic obstructive pulmonary disease) (HCC); Essential hypertension; Polycythemia; Muscle spasm; Osteoarthritis of spine; Encounter for long-term current use of medication; Chronic midline low back pain without sciatica; Encounter for long-term use of opiate analgesic; Smoking; Abnormal x-ray of lumbar spine; Mixed hyperlipidemia; Chronic nonseasonal allergic rhinitis due to pollen; Epigastric pain; Screen for colon cancer; and Alcohol-induced acute pancreatitis on their problem list.     Chief Complaint   Patient presents with   • lower back pain     12 week f/u        History of Present Illness     Labs of 4/22/22 reviewed.  Chief new complaint is fatigue over past 2 months, denies signs of illness but cough in office is congested and rhonchi heart left lung all fields, diminished sounds left lung. Reports cough nonproductive. 02 sat good at 98%. Sugars have been mildly elevated for a while now and again on labs 4/22/22. Will check a1c and insulin levels. Hbg/ hct, wbc normal.  Does not look to be iron deficient.    .chronic lower back pain 2/2 OA with DDD lumbar spine managed with hydrocodone with adequate relief reported. Due for refill soon, will send today for fill on due date.    No other complaint today.   Parts of most recent relevant visit HPI, ROS  and PE may be carried forward and all are updated as appropriate for current situation.    Past Surgical History:   Procedure Laterality Date   • COLONOSCOPY N/A 4/13/2021    Procedure: COLONOSCOPY;  Surgeon: Killian Melendez MD;  Location: [facility] ENDOSCOPY;  Service: Gastroenterology;  Laterality: N/A;   • CYST REMOVAL      neck   • ENDOSCOPY N/A 4/13/2021    Procedure: ESOPHAGOGASTRODUODENOSCOPY;  Surgeon: Killian Melendez MD;  Location: [facility] ENDOSCOPY;  Service: Gastroenterology;  Laterality: N/A;   • HERNIA REPAIR      Hernia repair w/mesh (1)      •  INJECTION OF MEDICATION  03/26/2014    Depo Medrol (Methylprednisone) 80mg (1)      • INJECTION OF MEDICATION  03/26/2014    Dexamethasone (1)      • INJECTION OF MEDICATION  06/04/2013    Kenalog (1)    • INJECTION OF MEDICATION  03/26/2014    Rocephin (1)      • OTHER SURGICAL HISTORY  03/26/2014    Nebulizer Treatment 95759 (1)         Social History     Socioeconomic History   • Marital status:    Tobacco Use   • Smoking status: Current Every Day Smoker     Packs/day: 1.00     Years: 35.00     Pack years: 35.00     Types: Cigarettes   • Smokeless tobacco: Never Used   • Tobacco comment: 20-39 cigarettes/day (5/9/16)   Vaping Use   • Vaping Use: Never used   Substance and Sexual Activity   • Alcohol use: Yes     Comment: Beer - 8 drinks a day. A  typical drinking day (5/9/16)   • Drug use: Yes     Types: Hydrocodone   • Sexual activity: Yes     Partners: Female      The following portions of the patient's history were reviewed and updated as appropriate: allergies, current medications, past family history, past medical history, past social history, past surgical history and problem list.    Review of Systems   Constitutional: Positive for fatigue. Negative for activity change, appetite change, chills, diaphoresis, fever and unexpected weight loss.   HENT: Negative.  Negative for ear pain, postnasal drip, rhinorrhea, sinus pressure, sneezing, sore throat, swollen glands, trouble swallowing and voice change.    Eyes: Negative.  Negative for discharge, redness, itching and visual disturbance.   Respiratory: Positive for cough, shortness of breath and wheezing. Negative for stridor.    Cardiovascular: Negative.  Negative for chest pain, palpitations and leg swelling.   Gastrointestinal: Negative.    Endocrine: Negative.  Negative for polydipsia, polyphagia and polyuria.   Genitourinary: Negative.  Negative for dysuria.   Musculoskeletal: Positive for back pain. Negative for arthralgias.   Skin: Negative.     Allergic/Immunologic: Negative.    Neurological: Negative.    Hematological: Negative.    Psychiatric/Behavioral: Negative.  Negative for behavioral problems, dysphoric mood, sleep disturbance, suicidal ideas and depressed mood. The patient is not nervous/anxious.    All other systems reviewed and are negative.    PHQ-9 Depression Screening  Little interest or pleasure in doing things?     Feeling down, depressed, or hopeless?     Trouble falling or staying asleep, or sleeping too much?     Feeling tired or having little energy?     Poor appetite or overeating?     Feeling bad about yourself - or that you are a failure or have let yourself or your family down?     Trouble concentrating on things, such as reading the newspaper or watching television?     Moving or speaking so slowly that other people could have noticed? Or the opposite - being so fidgety or restless that you have been moving around a lot more than usual?     Thoughts that you would be better off dead, or of hurting yourself in some way?     PHQ-9 Total Score     If you checked off any problems, how difficult have these problems made it for you to do your work, take care of things at home, or get along with other people?      Patient understands the risks associated with this controlled medication, including tolerance and addiction.  Patient also agrees to only obtain this medication from me, and not from a another provider, unless that provider is covering for me in my absence.  Patient also agrees to be compliant in dosing, and not self adjust the dose of medication.  A signed controlled substance agreement is on file, and the patient has received a controlled substance education sheet at this a previous visit.  The patient has also signed a consent for treatment with a controlled substance as per [facility] policy. TERRY was obtained.    Objective   Physical Exam  Vitals and nursing note reviewed.   Constitutional:       General: He is not  in acute distress.     Appearance: Normal appearance. He is well-developed. He is not ill-appearing or diaphoretic.   HENT:      Head: Normocephalic and atraumatic.      Nose: Nose normal.   Eyes:      General: No scleral icterus.        Right eye: No discharge.         Left eye: No discharge.      Conjunctiva/sclera: Conjunctivae normal.      Pupils: Pupils are equal, round, and reactive to light.   Neck:      Thyroid: No thyromegaly.      Vascular: No carotid bruit or JVD.      Trachea: No tracheal deviation.   Cardiovascular:      Rate and Rhythm: Normal rate and regular rhythm.      Pulses: Normal pulses.      Heart sounds: Normal heart sounds. No murmur heard.    No friction rub. No gallop.   Pulmonary:      Effort: Pulmonary effort is normal. No respiratory distress.      Breath sounds: No stridor. Rhonchi (left entire lung, diminished sounds as well. ) present. No wheezing or rales.   Chest:      Chest wall: No tenderness.   Abdominal:      General: Bowel sounds are normal.      Palpations: Abdomen is soft.   Musculoskeletal:         General: No tenderness or deformity. Normal range of motion.      Cervical back: Normal range of motion and neck supple. No rigidity or tenderness.      Right lower leg: No edema.      Left lower leg: No edema.   Lymphadenopathy:      Cervical: No cervical adenopathy.   Skin:     General: Skin is warm and dry.      Capillary Refill: Capillary refill takes 2 to 3 seconds.      Coloration: Skin is not jaundiced or pale.      Findings: No bruising, erythema, lesion or rash.   Neurological:      General: No focal deficit present.      Mental Status: He is alert and oriented to person, place, and time. Mental status is at baseline.      Motor: No weakness.      Gait: Gait normal.   Psychiatric:         Mood and Affect: Mood normal.         Behavior: Behavior normal.         Thought Content: Thought content normal.         Judgment: Judgment normal.       Vitals:    04/25/22 0820   BP:  "140/80   BP Location: Left arm   Patient Position: Sitting   Cuff Size: Adult   Pulse: 68   Resp: 16   Temp: 98.7 °F (37.1 °C)   SpO2: 98%   Weight: 69.7 kg (153 lb 9.6 oz)   Height: 182.9 cm (72\")   PainSc:   7   PainLoc: Back      Body mass index is 20.83 kg/m².      Assessment/Plan   Diagnoses and all orders for this visit:    1. Hyperglycemia (Primary)  -     Hemoglobin A1c  -     Insulin, Free & Total, Serum    2. Chronic midline low back pain without sciatica  Comments:  controlled to stated satisfaction today. able to garden and work as needed, and sleep without waking due to pain. refill hydrocodone today.  Orders:  -     HYDROcodone-acetaminophen (Lortab) 7.5-325 MG per tablet; Take 1 tablet by mouth Every 6 (Six) Hours As Needed for Moderate Pain . Fill when due  Dispense: 120 tablet; Refill: 0    3. Encounter for long-term current use of medication  -     HYDROcodone-acetaminophen (Lortab) 7.5-325 MG per tablet; Take 1 tablet by mouth Every 6 (Six) Hours As Needed for Moderate Pain . Fill when due  Dispense: 120 tablet; Refill: 0    4. Osteoarthritis of spine with radiculopathy, lumbar region  Comments:  stable, refill hydrocodone today for associated pain.  Orders:  -     HYDROcodone-acetaminophen (Lortab) 7.5-325 MG per tablet; Take 1 tablet by mouth Every 6 (Six) Hours As Needed for Moderate Pain . Fill when due  Dispense: 120 tablet; Refill: 0    5. Mixed hyperlipidemia  Comments:  stable. due for repeat lipid before next visit in late April. conitnue pravastatin.   Orders:  -     HYDROcodone-acetaminophen (Lortab) 7.5-325 MG per tablet; Take 1 tablet by mouth Every 6 (Six) Hours As Needed for Moderate Pain . Fill when due  Dispense: 120 tablet; Refill: 0    6. Chronic fatigue, unspecified  -     Vitamin B12  -     Folate    7. Abnormal CBC measurement  -     Vitamin B12  -     Folate    8. Essential hypertension  -     lisinopril (PRINIVIL,ZESTRIL) 10 MG tablet; Take 1 tablet by mouth Daily.  " Dispense: 30 tablet; Refill: 0    9. Acute URI  -     XR Chest 2 View  -     azithromycin (Zithromax Z-Piter) 250 MG tablet; Take 2 tablets by mouth on day 1, then 1 tablet daily on days 2-5  Dispense: 6 tablet; Refill: 0  -     predniSONE (DELTASONE) 20 MG tablet; Take 1 tablet by mouth 2 (Two) Times a Day for 7 days.  Dispense: 14 tablet; Refill: 0    10. COPD with exacerbation (HCC)  -     XR Chest 2 View  -     azithromycin (Zithromax Z-Piter) 250 MG tablet; Take 2 tablets by mouth on day 1, then 1 tablet daily on days 2-5  Dispense: 6 tablet; Refill: 0  -     predniSONE (DELTASONE) 20 MG tablet; Take 1 tablet by mouth 2 (Two) Times a Day for 7 days.  Dispense: 14 tablet; Refill: 0               Return in about 12 weeks (around 7/18/2022) for 2 weeks for follow up of high blood pressure with medication changes and evaluation of breathing..  There are no Patient Instructions on file for this visit.        This document has been electronically signed by JASMINE Noel on April 25, 2022 09:07 CDT

## 2022-04-26 LAB
FOLATE SERPL-MCNC: 5.98 NG/ML (ref 4.78–24.2)
VIT B12 BLD-MCNC: 322 PG/ML (ref 211–946)

## 2022-05-02 LAB
INSULIN FREE SERPL-ACNC: 3.6 UU/ML
INSULIN SERPL-ACNC: 3.6 UU/ML

## 2022-05-16 ENCOUNTER — OFFICE VISIT (OUTPATIENT)
Dept: FAMILY MEDICINE CLINIC | Facility: CLINIC | Age: 67
End: 2022-05-16

## 2022-05-16 VITALS
DIASTOLIC BLOOD PRESSURE: 70 MMHG | RESPIRATION RATE: 16 BRPM | HEIGHT: 72 IN | BODY MASS INDEX: 20.86 KG/M2 | SYSTOLIC BLOOD PRESSURE: 138 MMHG | WEIGHT: 154 LBS | OXYGEN SATURATION: 98 % | TEMPERATURE: 98.4 F | HEART RATE: 75 BPM

## 2022-05-16 DIAGNOSIS — I10 ESSENTIAL HYPERTENSION: ICD-10-CM

## 2022-05-16 DIAGNOSIS — J44.1 COPD WITH EXACERBATION: Primary | ICD-10-CM

## 2022-05-16 PROCEDURE — 99213 OFFICE O/P EST LOW 20 MIN: CPT | Performed by: NURSE PRACTITIONER

## 2022-05-16 RX ORDER — LISINOPRIL 10 MG/1
10 TABLET ORAL DAILY
Qty: 30 TABLET | Refills: 5 | Status: SHIPPED | OUTPATIENT
Start: 2022-05-16 | End: 2022-09-22 | Stop reason: SDUPTHER

## 2022-05-16 NOTE — PROGRESS NOTES
Subjective   Derrell Bynum is a 67 y.o. male.     has Coalworker's pneumoconiosis (HCC); COPD (chronic obstructive pulmonary disease) (HCC); Essential hypertension; Polycythemia; Muscle spasm; Osteoarthritis of spine; Encounter for long-term current use of medication; Chronic midline low back pain without sciatica; Encounter for long-term use of opiate analgesic; Smoking; Abnormal x-ray of lumbar spine; Mixed hyperlipidemia; Chronic nonseasonal allergic rhinitis due to pollen; Epigastric pain; Screen for colon cancer; and Alcohol-induced acute pancreatitis on their problem list.     Chief Complaint   Patient presents with   • Follow-up     2 week xrays        History of Present Illness   Seen in office on 4/25/22 with acute URI/ COPD exacerbation treated with prednisone and azithromycin. CXR showed no pneumonia, evidence of old scarring and COPD, as well as pleural thickening of right costophrenic angle. Here in follow up after treatment today. Today he reports;no hx of exposure to asbestos, does have pneumoconiosis managed by [facility]. Reports zero change + or negative, to effort of breathing, sputum purulence, color, quantity. He does report he had a better appetite with the prednisone but no other improvement. Feels breathing is at baseline. Other complaints today: none.    Parts of most recent relevant visit HPI, ROS  and PE may be carried forward and all are updated as appropriate for current situation.    Past Surgical History:   Procedure Laterality Date   • COLONOSCOPY N/A 4/13/2021    Procedure: COLONOSCOPY;  Surgeon: Killian Melendez MD;  Location: [facility] ENDOSCOPY;  Service: Gastroenterology;  Laterality: N/A;   • CYST REMOVAL      neck   • ENDOSCOPY N/A 4/13/2021    Procedure: ESOPHAGOGASTRODUODENOSCOPY;  Surgeon: Killian Melendez MD;  Location: [facility] ENDOSCOPY;  Service: Gastroenterology;  Laterality: N/A;   • HERNIA REPAIR      Hernia repair w/mesh (1)      • INJECTION OF  MEDICATION  03/26/2014    Depo Medrol (Methylprednisone) 80mg (1)      • INJECTION OF MEDICATION  03/26/2014    Dexamethasone (1)      • INJECTION OF MEDICATION  06/04/2013    Kenalog (1)    • INJECTION OF MEDICATION  03/26/2014    Rocephin (1)      • OTHER SURGICAL HISTORY  03/26/2014    Nebulizer Treatment 29856 (1)         Social History     Socioeconomic History   • Marital status:    Tobacco Use   • Smoking status: Current Every Day Smoker     Packs/day: 1.00     Years: 35.00     Pack years: 35.00     Types: Cigarettes   • Smokeless tobacco: Never Used   • Tobacco comment: 20-39 cigarettes/day (5/9/16)   Vaping Use   • Vaping Use: Never used   Substance and Sexual Activity   • Alcohol use: Yes     Comment: Beer - 8 drinks a day. A  typical drinking day (5/9/16)   • Drug use: Yes     Types: Hydrocodone   • Sexual activity: Yes     Partners: Female      The following portions of the patient's history were reviewed and updated as appropriate: allergies, current medications, past family history, past medical history, past social history, past surgical history and problem list.    Review of Systems   Constitutional: Positive for fatigue. Negative for activity change, appetite change, chills, diaphoresis, fever and unexpected weight loss.   HENT: Negative.  Negative for ear pain, postnasal drip, rhinorrhea, sinus pressure, sneezing, sore throat, swollen glands, trouble swallowing and voice change.    Eyes: Negative.  Negative for discharge, redness, itching and visual disturbance.   Respiratory: Positive for cough, shortness of breath and wheezing. Negative for stridor.    Cardiovascular: Negative.  Negative for chest pain, palpitations and leg swelling.   Gastrointestinal: Negative.    Endocrine: Negative.  Negative for polydipsia, polyphagia and polyuria.   Genitourinary: Negative.  Negative for dysuria.   Musculoskeletal: Positive for back pain. Negative for arthralgias.   Skin: Negative.     Allergic/Immunologic: Negative.    Neurological: Negative.    Hematological: Negative.    Psychiatric/Behavioral: Negative.  Negative for behavioral problems, dysphoric mood, sleep disturbance, suicidal ideas and depressed mood. The patient is not nervous/anxious.    All other systems reviewed and are negative.    PHQ-9 Depression Screening  Little interest or pleasure in doing things?     Feeling down, depressed, or hopeless?     Trouble falling or staying asleep, or sleeping too much?     Feeling tired or having little energy?     Poor appetite or overeating?     Feeling bad about yourself - or that you are a failure or have let yourself or your family down?     Trouble concentrating on things, such as reading the newspaper or watching television?     Moving or speaking so slowly that other people could have noticed? Or the opposite - being so fidgety or restless that you have been moving around a lot more than usual?     Thoughts that you would be better off dead, or of hurting yourself in some way?     PHQ-9 Total Score     If you checked off any problems, how difficult have these problems made it for you to do your work, take care of things at home, or get along with other people?      Patient understands the risks associated with this controlled medication, including tolerance and addiction.  Patient also agrees to only obtain this medication from me, and not from a another provider, unless that provider is covering for me in my absence.  Patient also agrees to be compliant in dosing, and not self adjust the dose of medication.  A signed controlled substance agreement is on file, and the patient has received a controlled substance education sheet at this a previous visit.  The patient has also signed a consent for treatment with a controlled substance as per [facility] policy. TERRY was obtained.    Objective    Study Result    Narrative & Impression   TWO VIEW CHEST     HISTORY: Cough. Acute upper  respiratory infection. Chronic  obstructive pulmonary disease with acute exacerbation.     Frontal and lateral films of the chest were obtained.     COMPARISON: November 17, 2016. Correlation CT November 13, 2020.     FINDINGS:    Chronic obstructive pulmonary disease.  No acute infiltrate.  Linear scarring lung bases.  Pleural thickening right costophrenic angle.  Old granulomatous disease is present.  The heart is not enlarged.  The pulmonary vasculature is not increased.  No pleural effusion.  No pneumothorax.  No acute osseous abnormality.  Interposition of the hepatic flexure between the liver and right  hemidiaphragm.     IMPRESSION:  CONCLUSION:  Chronic obstructive pulmonary disease.  No acute infiltrate.  Linear scarring lung bases.  Pleural thickening right costophrenic angle.     [id number]     Electronically signed by:  Akshat Antonio MD  4/26/2022 9:10 AM CDT       Physical Exam  Vitals and nursing note reviewed.   Constitutional:       General: He is not in acute distress.     Appearance: Normal appearance. He is well-developed. He is not ill-appearing or diaphoretic.   HENT:      Head: Normocephalic and atraumatic.      Nose: Nose normal.   Eyes:      General: No scleral icterus.        Right eye: No discharge.         Left eye: No discharge.      Conjunctiva/sclera: Conjunctivae normal.      Pupils: Pupils are equal, round, and reactive to light.   Neck:      Thyroid: No thyromegaly.      Vascular: No carotid bruit or JVD.      Trachea: No tracheal deviation.   Cardiovascular:      Rate and Rhythm: Normal rate and regular rhythm.      Pulses: Normal pulses.      Heart sounds: Normal heart sounds. No murmur heard.    No friction rub. No gallop.   Pulmonary:      Effort: Pulmonary effort is normal. No respiratory distress.      Breath sounds: No stridor. Wheezing present. No rhonchi or rales.   Chest:      Chest wall: No tenderness.   Abdominal:      General: Bowel sounds are normal.      Palpations: Abdomen  "is soft.   Musculoskeletal:         General: No tenderness or deformity. Normal range of motion.      Cervical back: Normal range of motion and neck supple. No rigidity or tenderness.      Right lower leg: No edema.      Left lower leg: No edema.   Lymphadenopathy:      Cervical: No cervical adenopathy.   Skin:     General: Skin is warm and dry.      Capillary Refill: Capillary refill takes 2 to 3 seconds.      Coloration: Skin is not jaundiced or pale.      Findings: No bruising, erythema, lesion or rash.   Neurological:      General: No focal deficit present.      Mental Status: He is alert and oriented to person, place, and time. Mental status is at baseline.      Motor: No weakness.      Gait: Gait normal.   Psychiatric:         Mood and Affect: Mood normal.         Behavior: Behavior normal.         Thought Content: Thought content normal.         Judgment: Judgment normal.       Vitals:    05/16/22 0848   BP: 138/70   Pulse: 75   Resp: 16   Temp: 98.4 °F (36.9 °C)   SpO2: 98%   Weight: 69.9 kg (154 lb)   Height: 182.9 cm (72\")   PainSc:   7   PainLoc: Back      Body mass index is 20.89 kg/m².      Assessment & Plan   Diagnoses and all orders for this visit:    1. COPD with exacerbation (HCC) (Primary)    2. Essential hypertension  -     lisinopril (PRINIVIL,ZESTRIL) 10 MG tablet; Take 1 tablet by mouth Daily.  Dispense: 30 tablet; Refill: 5               Return in about 12 weeks (around 8/8/2022) for Next scheduled follow up.  There are no Patient Instructions on file for this visit.        This document has been electronically signed by JASMINE Noel on May 16, 2022 09:24 CDT    "

## 2022-06-05 DIAGNOSIS — Z79.899 ENCOUNTER FOR LONG-TERM CURRENT USE OF MEDICATION: ICD-10-CM

## 2022-06-05 DIAGNOSIS — M54.50 CHRONIC MIDLINE LOW BACK PAIN WITHOUT SCIATICA: Chronic | ICD-10-CM

## 2022-06-05 DIAGNOSIS — M47.26 OSTEOARTHRITIS OF SPINE WITH RADICULOPATHY, LUMBAR REGION: Chronic | ICD-10-CM

## 2022-06-05 DIAGNOSIS — E78.2 MIXED HYPERLIPIDEMIA: Chronic | ICD-10-CM

## 2022-06-05 DIAGNOSIS — G89.29 CHRONIC MIDLINE LOW BACK PAIN WITHOUT SCIATICA: Chronic | ICD-10-CM

## 2022-06-06 ENCOUNTER — TELEPHONE (OUTPATIENT)
Dept: FAMILY MEDICINE CLINIC | Facility: CLINIC | Age: 67
End: 2022-06-06

## 2022-06-06 RX ORDER — HYDROCODONE BITARTRATE AND ACETAMINOPHEN 7.5; 325 MG/1; MG/1
1 TABLET ORAL EVERY 6 HOURS PRN
Qty: 120 TABLET | Refills: 0 | Status: SHIPPED | OUTPATIENT
Start: 2022-06-06 | End: 2022-07-05 | Stop reason: SDUPTHER

## 2022-06-06 NOTE — TELEPHONE ENCOUNTER
Patient seen in office every 3 months for chronic pain requiring opiate pain medication. Compliant with medication, visits with no adverse effects noted. TERRY and UDS current and appropriate. Patient called requesting scheduled refill at appropriate interval. Patient understands the risks associated with this controlled medication, including tolerance and addiction.  Patient also agrees to only obtain this medication from me, and not from a another provider, unless that provider is covering for me in my absence.  Patient also agrees to be compliant in dosing, and not self adjust the dose of medication.  A signed controlled substance agreement is on file, and the patient has received a controlled substance education sheet at this a previous visit.  The patient has also signed a consent for treatment with a controlled substance as per [facility] policy. TERRY was obtained.   Refill sent for hydrocodone.     This document has been electronically signed by JASMINE Noel on June 6, 2022 11:57 CDT

## 2022-06-19 DIAGNOSIS — J30.89 CHRONIC NONSEASONAL ALLERGIC RHINITIS DUE TO POLLEN: ICD-10-CM

## 2022-06-19 DIAGNOSIS — I10 ESSENTIAL HYPERTENSION: ICD-10-CM

## 2022-06-20 RX ORDER — AMLODIPINE BESYLATE 10 MG/1
10 TABLET ORAL DAILY
Qty: 90 TABLET | Refills: 1 | Status: SHIPPED | OUTPATIENT
Start: 2022-06-20 | End: 2022-09-22 | Stop reason: SDUPTHER

## 2022-06-20 RX ORDER — MONTELUKAST SODIUM 10 MG/1
10 TABLET ORAL NIGHTLY
Qty: 90 TABLET | Refills: 1 | Status: SHIPPED | OUTPATIENT
Start: 2022-06-20 | End: 2023-01-30 | Stop reason: SDUPTHER

## 2022-07-05 DIAGNOSIS — Z79.899 ENCOUNTER FOR LONG-TERM CURRENT USE OF MEDICATION: ICD-10-CM

## 2022-07-05 DIAGNOSIS — G89.29 CHRONIC MIDLINE LOW BACK PAIN WITHOUT SCIATICA: Chronic | ICD-10-CM

## 2022-07-05 DIAGNOSIS — M54.50 CHRONIC MIDLINE LOW BACK PAIN WITHOUT SCIATICA: Chronic | ICD-10-CM

## 2022-07-05 DIAGNOSIS — E78.2 MIXED HYPERLIPIDEMIA: Chronic | ICD-10-CM

## 2022-07-05 DIAGNOSIS — M47.26 OSTEOARTHRITIS OF SPINE WITH RADICULOPATHY, LUMBAR REGION: Chronic | ICD-10-CM

## 2022-07-05 DIAGNOSIS — K21.00 GASTROESOPHAGEAL REFLUX DISEASE WITH ESOPHAGITIS WITHOUT HEMORRHAGE: ICD-10-CM

## 2022-07-07 ENCOUNTER — TELEPHONE (OUTPATIENT)
Dept: FAMILY MEDICINE CLINIC | Facility: CLINIC | Age: 67
End: 2022-07-07

## 2022-07-07 RX ORDER — HYDROCODONE BITARTRATE AND ACETAMINOPHEN 7.5; 325 MG/1; MG/1
1 TABLET ORAL EVERY 6 HOURS PRN
Qty: 120 TABLET | Refills: 0 | Status: SHIPPED | OUTPATIENT
Start: 2022-07-07 | End: 2022-08-05 | Stop reason: SDUPTHER

## 2022-07-07 RX ORDER — PANTOPRAZOLE SODIUM 40 MG/1
40 TABLET, DELAYED RELEASE ORAL EVERY MORNING
Qty: 30 TABLET | Refills: 5 | Status: SHIPPED | OUTPATIENT
Start: 2022-07-07 | End: 2022-10-13 | Stop reason: SDUPTHER

## 2022-07-07 NOTE — TELEPHONE ENCOUNTER
Patient seen in office every 3 months for chronic pain requiring opiate pain medication. Compliant with medication, visits with no adverse effects noted. TERRY and UDS current and appropriate. Patient called requesting scheduled refill at appropriate interval. Patient understands the risks associated with this controlled medication, including tolerance and addiction.  Patient also agrees to only obtain this medication from me, and not from a another provider, unless that provider is covering for me in my absence.  Patient also agrees to be compliant in dosing, and not self adjust the dose of medication.  A signed controlled substance agreement is on file, and the patient has received a controlled substance education sheet at this a previous visit.  The patient has also signed a consent for treatment with a controlled substance as per [facility] policy. TERRY was obtained.   Refill sent for hydrocodone.     This document has been electronically signed by JASMINE Noel on July 7, 2022 09:10 CDT

## 2022-08-05 DIAGNOSIS — Z79.899 ENCOUNTER FOR LONG-TERM CURRENT USE OF MEDICATION: ICD-10-CM

## 2022-08-05 DIAGNOSIS — M47.26 OSTEOARTHRITIS OF SPINE WITH RADICULOPATHY, LUMBAR REGION: Chronic | ICD-10-CM

## 2022-08-05 DIAGNOSIS — M54.50 CHRONIC MIDLINE LOW BACK PAIN WITHOUT SCIATICA: Chronic | ICD-10-CM

## 2022-08-05 DIAGNOSIS — E78.2 MIXED HYPERLIPIDEMIA: Chronic | ICD-10-CM

## 2022-08-05 DIAGNOSIS — G89.29 CHRONIC MIDLINE LOW BACK PAIN WITHOUT SCIATICA: Chronic | ICD-10-CM

## 2022-08-05 NOTE — TELEPHONE ENCOUNTER
Incoming Refill Request      Medication requested (name and dose):   [place]     Pharmacy where request should be sent:   [facility]     Additional details provided by patient:   SOLORZANO   HAS APPT WITH TANISHA 8/16/22    Best call back number:     Does the patient have less than a 3 day supply:  [] Yes  [] No    Gale Logan  08/05/22, 10:37 CDT

## 2022-08-06 RX ORDER — HYDROCODONE BITARTRATE AND ACETAMINOPHEN 7.5; 325 MG/1; MG/1
1 TABLET ORAL EVERY 6 HOURS PRN
Qty: 120 TABLET | Refills: 0 | Status: SHIPPED | OUTPATIENT
Start: 2022-08-06 | End: 2022-09-06 | Stop reason: SDUPTHER

## 2022-08-16 ENCOUNTER — OFFICE VISIT (OUTPATIENT)
Dept: FAMILY MEDICINE CLINIC | Facility: CLINIC | Age: 67
End: 2022-08-16

## 2022-08-16 VITALS
RESPIRATION RATE: 18 BRPM | WEIGHT: 148 LBS | DIASTOLIC BLOOD PRESSURE: 82 MMHG | BODY MASS INDEX: 20.05 KG/M2 | HEIGHT: 72 IN | TEMPERATURE: 97.9 F | SYSTOLIC BLOOD PRESSURE: 138 MMHG

## 2022-08-16 DIAGNOSIS — Z79.899 ENCOUNTER FOR LONG-TERM CURRENT USE OF MEDICATION: ICD-10-CM

## 2022-08-16 DIAGNOSIS — M54.50 CHRONIC MIDLINE LOW BACK PAIN WITHOUT SCIATICA: Primary | ICD-10-CM

## 2022-08-16 DIAGNOSIS — K21.00 GASTROESOPHAGEAL REFLUX DISEASE WITH ESOPHAGITIS WITHOUT HEMORRHAGE: ICD-10-CM

## 2022-08-16 DIAGNOSIS — R73.02 IGT (IMPAIRED GLUCOSE TOLERANCE): ICD-10-CM

## 2022-08-16 DIAGNOSIS — M47.26 OSTEOARTHRITIS OF SPINE WITH RADICULOPATHY, LUMBAR REGION: ICD-10-CM

## 2022-08-16 DIAGNOSIS — I10 ESSENTIAL HYPERTENSION: Chronic | ICD-10-CM

## 2022-08-16 DIAGNOSIS — E78.2 MIXED HYPERLIPIDEMIA: ICD-10-CM

## 2022-08-16 DIAGNOSIS — G89.29 CHRONIC MIDLINE LOW BACK PAIN WITHOUT SCIATICA: Primary | ICD-10-CM

## 2022-08-16 DIAGNOSIS — E55.9 VITAMIN D DEFICIENCY: ICD-10-CM

## 2022-08-16 DIAGNOSIS — Z13.21 ENCOUNTER FOR SCREENING FOR NUTRITIONAL DISORDER: ICD-10-CM

## 2022-08-16 DIAGNOSIS — Z12.5 SCREENING FOR MALIGNANT NEOPLASM OF PROSTATE: ICD-10-CM

## 2022-08-16 DIAGNOSIS — Z53.20 LUNG CANCER SCREENING DECLINED BY PATIENT: ICD-10-CM

## 2022-08-16 DIAGNOSIS — J44.9 CHRONIC OBSTRUCTIVE PULMONARY DISEASE, UNSPECIFIED COPD TYPE: Chronic | ICD-10-CM

## 2022-08-16 DIAGNOSIS — Z13.29 SCREENING FOR THYROID DISORDER: ICD-10-CM

## 2022-08-16 PROCEDURE — 99214 OFFICE O/P EST MOD 30 MIN: CPT | Performed by: PHYSICIAN ASSISTANT

## 2022-08-16 RX ORDER — PRAVASTATIN SODIUM 20 MG
20 TABLET ORAL DAILY
Qty: 90 TABLET | Refills: 1 | Status: SHIPPED | OUTPATIENT
Start: 2022-08-16

## 2022-08-16 RX ORDER — METOPROLOL SUCCINATE 50 MG/1
50 TABLET, EXTENDED RELEASE ORAL DAILY
Qty: 90 TABLET | Refills: 1 | Status: SHIPPED | OUTPATIENT
Start: 2022-08-16 | End: 2023-02-28 | Stop reason: SDUPTHER

## 2022-08-16 RX ORDER — ALBUTEROL SULFATE 90 UG/1
2 AEROSOL, METERED RESPIRATORY (INHALATION) EVERY 6 HOURS PRN
Qty: 18 G | Refills: 11 | Status: SHIPPED | OUTPATIENT
Start: 2022-08-16

## 2022-08-16 NOTE — PROGRESS NOTES
Subjective   Derrell Bynum is a 67 y.o. male.     History of Present Illness     Pt presents today for a follow up on chronic conditions including essential htn, hld, copd, gerd, chronic low back pain 2/2 OA with DDD lumbar spine. PCP JASMINE Salmon.    Chronic low back pain - 2/2 OA with DDD lumbar spine. MRI lumbar spine completed 12/28/18 with left lateral disc herniation at L3-L4 resulting in severe neural foraminal stenosis and effacement of exiting nerve root. Mild central canal stenosis at L4-L5 2/2 generalized disc bulge. He is managed with hydrocodone 7.5-325mg QID PRN and tizanidine 2mg TID PRN per his pcp JASMINE Becerril. He does report improvement in ADLs with medication. He denies adverse effects or side effects of medication. UDS completed 1/31/22 and appropriate. Controlled contract utd, completed 1/31/22.    COPD - chronic, managed with albuterol inhaler QID PRN, breo ellipta. He does admit to exertional dyspnea, can walk approximately 1 block. He denies wheezing. He does admit to chronic cough of productive cough of white sputum. He is a current smoker of 1ppd x 35 years. He declines LDCT chest, will consider at later date.    GERD - chronic, managed with protonix 40mg daily.    Essential htn - chronic, controlled, managed with amlodipine 10mg daily, lisinopril 10mg daily, metoprolol 50mg daily, and clonidine 0.1mg BID PRN for systolic bp >160. He denies chest pain, peripheral edema, syncope/presyncope, dizziness, lightheadedness.     Hyperlipidemia - managed with pravastatin.    Chronic rhinitis - well controlled with singular 10mg qhs.     The following portions of the patient's history were reviewed and updated as appropriate: allergies, current medications, past family history, past medical history, past social history, past surgical history and problem list.    Review of Systems   Constitutional: Negative.  Negative for activity change, appetite change, chills, fever and unexpected weight  loss.   HENT: Negative.  Negative for ear pain, postnasal drip, rhinorrhea, sinus pressure, sneezing, sore throat, swollen glands, trouble swallowing and voice change.    Eyes: Negative.  Negative for discharge, redness, itching and visual disturbance.   Respiratory: Positive for cough and shortness of breath (exertional). Negative for wheezing and stridor.    Cardiovascular: Negative.  Negative for chest pain, palpitations and leg swelling.   Gastrointestinal: Negative.    Endocrine: Negative.  Negative for polydipsia, polyphagia and polyuria.   Genitourinary: Negative.  Negative for dysuria.   Musculoskeletal: Positive for back pain. Negative for arthralgias.   Skin: Negative.    Allergic/Immunologic: Negative.    Neurological: Negative.    Hematological: Negative.    Psychiatric/Behavioral: Negative.  Negative for behavioral problems, dysphoric mood, sleep disturbance, suicidal ideas and depressed mood. The patient is not nervous/anxious.    All other systems reviewed and are negative.      Objective   Physical Exam  Vitals and nursing note reviewed.   Constitutional:       General: He is not in acute distress.     Appearance: Normal appearance. He is well-developed and normal weight. He is not ill-appearing or diaphoretic.   HENT:      Head: Normocephalic and atraumatic.   Eyes:      General: No scleral icterus.        Right eye: No discharge.         Left eye: No discharge.      Conjunctiva/sclera: Conjunctivae normal.      Pupils: Pupils are equal, round, and reactive to light.   Neck:      Thyroid: No thyromegaly.      Vascular: No carotid bruit or JVD.      Trachea: No tracheal deviation.   Cardiovascular:      Rate and Rhythm: Normal rate and regular rhythm.      Pulses: Normal pulses.      Heart sounds: Normal heart sounds. No murmur heard.    No friction rub. No gallop.   Pulmonary:      Effort: Pulmonary effort is normal. No respiratory distress.      Breath sounds: Normal breath sounds. No stridor. No  wheezing, rhonchi or rales.   Chest:      Chest wall: No tenderness.   Abdominal:      General: Bowel sounds are normal.      Palpations: Abdomen is soft.   Musculoskeletal:         General: No tenderness or deformity.      Cervical back: Normal range of motion and neck supple. No rigidity or tenderness.      Lumbar back: Spasms (lumbar paraspinal muscles) present. No tenderness or bony tenderness. Decreased range of motion.      Right lower leg: No edema.      Left lower leg: No edema.   Lymphadenopathy:      Cervical: No cervical adenopathy.   Skin:     General: Skin is warm and dry.      Capillary Refill: Capillary refill takes less than 2 seconds.      Coloration: Skin is not jaundiced or pale.      Findings: No bruising, erythema, lesion or rash.   Neurological:      General: No focal deficit present.      Mental Status: He is alert and oriented to person, place, and time. Mental status is at baseline.      Motor: No weakness.      Gait: Gait normal.   Psychiatric:         Mood and Affect: Mood normal.         Behavior: Behavior normal.         Thought Content: Thought content normal.         Judgment: Judgment normal.       Vitals:    08/16/22 0919   BP: 138/82   Resp: 18   Temp: 97.9 °F (36.6 °C)      Assessment & Plan   Diagnoses and all orders for this visit:    1. Chronic midline low back pain without sciatica (Primary)    2. Chronic obstructive pulmonary disease, unspecified COPD type (HCC)  -     albuterol sulfate HFA (Ventolin HFA) 108 (90 Base) MCG/ACT inhaler; Inhale 2 puffs Every 6 (Six) Hours As Needed for Wheezing.  Dispense: 18 g; Refill: 11  -     Fluticasone Furoate-Vilanterol (BREO ELLIPTA) 100-25 MCG/INH inhaler; Inhale 1 puff Daily.  Dispense: 1 each; Refill: 5    3. Essential hypertension  Comments:  increased Toprol XL to 50mg daily today 1/31/22. continue amlodipine 10mg daily.   Orders:  -     metoprolol succinate XL (Toprol XL) 50 MG 24 hr tablet; Take 1 tablet by mouth Daily. Dose  increased on 1/31/22. For high blood pressure  Dispense: 90 tablet; Refill: 1  -     CBC w AUTO Differential; Future  -     Comprehensive metabolic panel; Future    4. Encounter for long-term current use of medication  -     ToxASSURE Select 13 Discrete -; Future    5. Mixed hyperlipidemia  -     pravastatin (PRAVACHOL) 20 MG tablet; Take 1 tablet by mouth Daily.  Dispense: 90 tablet; Refill: 1  -     Lipid panel; Future    6. Gastroesophageal reflux disease with esophagitis without hemorrhage    7. Screening for malignant neoplasm of prostate  -     PSA Screen; Future    8. Screening for thyroid disorder  -     TSH; Future  -     T4, free; Future    9. IGT (impaired glucose tolerance)  -     Hemoglobin A1c; Future    10. Encounter for screening for nutritional disorder  -     Vitamin B12; Future  -     Folate; Future  -     Vitamin D 25 hydroxy; Future    11. Osteoarthritis of spine with radiculopathy, lumbar region    12. Vitamin D deficiency  -     Vitamin D 25 hydroxy; Future    13. Lung cancer screening declined by patient      Chronic low back pain - 2/2 OA with DDD lumbar spine. MRI lumbar spine completed 12/28/18 with left lateral disc herniation at L3-L4 resulting in severe neural foraminal stenosis and effacement of exiting nerve root. Mild central canal stenosis at L4-L5 2/2 generalized disc bulge. He is managed with hydrocodone 7.5-325mg QID PRN and tizanidine 2mg TID PRN per his pcp JASMINE Becerril. He does report improvement in ADLs with medication. He denies adverse effects or side effects of medication. UDS completed 1/31/22 and appropriate. Controlled contract utd, completed 1/31/22. I did discuss with him that I do not have a [employer] license and will not be able to prescribe hydrocodone for him. I will notify Ms. Hinds's supervising physician for patient's request for refill of hydrocodone to review and refill as deemed appropriate. Patient understands the risks associated with this controlled medication,  including tolerance and addiction.  he also agrees to only obtain this medication from her pcp Lou FERRARO or her supervising physician, and not from another provider, unless that provider is covering for her pcp in their absence. he also agrees to be compliant in dosing, and not self adjust the dose of medication. Ravi to be obtained by covering provider.    COPD - chronic, managed with albuterol inhaler QID PRN, breo ellipta. He is a current smoker of 1ppd x 35 years. He declines LDCT chest, will consider at later date. Advised strict tobacco cessation. Recommended to add spiriva, pt declined.     Derrell Bynum  reports that he has been smoking cigarettes. He has a 35.00 pack-year smoking history. He has never used smokeless tobacco.. I have educated him on the risk of diseases from using tobacco products such as cancer, COPD and heart disease.   I advised him to quit and he is not willing to quit.  I spent 3  minutes counseling the patient.    GERD - chronic, managed with protonix 40mg daily. Avoid gerd irritants. Avoid lying down <1 hour after meals.     Essential htn - chronic, controlled, managed with amlodipine 10mg daily, lisinopril 10mg daily, metoprolol 50mg daily, and clonidine 0.1mg BID PRN for systolic bp >160. We discussed dietary changes and the need to decrease salt intake, consistent with the dash diet. Also encouraged the patient to begin regular exercise routine.     Hyperlipidemia - managed with pravastatin 20mg daily. Obtain lipid panel as above for routine monitoring. Last lipid panel 4/22/22 at goal with total cholesterol 175, triglycerides 60, hdl 101, ldl 62.    Chronic rhinitis - well controlled with singular 10mg qhs.     Patient educated to follow up in 3 months or sooner than next scheduled appointment if needed. Patient stated understanding and has agreed with plan of care. After visit summary was printed and given to patient.      This document has been electronically signed  by Rena Montana PA-C on August 24, 2022 08:49 CDT,.

## 2022-09-06 DIAGNOSIS — E78.2 MIXED HYPERLIPIDEMIA: Chronic | ICD-10-CM

## 2022-09-06 DIAGNOSIS — M47.26 OSTEOARTHRITIS OF SPINE WITH RADICULOPATHY, LUMBAR REGION: Chronic | ICD-10-CM

## 2022-09-06 DIAGNOSIS — G89.29 CHRONIC MIDLINE LOW BACK PAIN WITHOUT SCIATICA: Chronic | ICD-10-CM

## 2022-09-06 DIAGNOSIS — M54.50 CHRONIC MIDLINE LOW BACK PAIN WITHOUT SCIATICA: Chronic | ICD-10-CM

## 2022-09-06 DIAGNOSIS — Z79.899 ENCOUNTER FOR LONG-TERM CURRENT USE OF MEDICATION: ICD-10-CM

## 2022-09-06 RX ORDER — HYDROCODONE BITARTRATE AND ACETAMINOPHEN 7.5; 325 MG/1; MG/1
1 TABLET ORAL EVERY 6 HOURS PRN
Qty: 120 TABLET | Refills: 0 | Status: SHIPPED | OUTPATIENT
Start: 2022-09-06 | End: 2022-10-07 | Stop reason: SDUPTHER

## 2022-09-12 ENCOUNTER — TELEPHONE (OUTPATIENT)
Dept: FAMILY MEDICINE CLINIC | Facility: CLINIC | Age: 67
End: 2022-09-12

## 2022-09-13 ENCOUNTER — TELEPHONE (OUTPATIENT)
Dept: FAMILY MEDICINE CLINIC | Facility: CLINIC | Age: 67
End: 2022-09-13

## 2022-09-13 NOTE — TELEPHONE ENCOUNTER
Pt called back about Est'ing with another Provider, Pt said [facility] Office said they aren't accepting Pts, I sent him [facility] to [facility] to see if they can est him, He did't want to Est with Rena Since she can not write his pain medications he doesn't want pain Managment

## 2022-09-21 DIAGNOSIS — I10 ESSENTIAL HYPERTENSION: ICD-10-CM

## 2022-09-22 DIAGNOSIS — I10 ESSENTIAL HYPERTENSION: ICD-10-CM

## 2022-09-22 RX ORDER — LISINOPRIL 10 MG/1
10 TABLET ORAL DAILY
Qty: 30 TABLET | Refills: 5 | Status: SHIPPED | OUTPATIENT
Start: 2022-09-22

## 2022-09-22 RX ORDER — LISINOPRIL 10 MG/1
10 TABLET ORAL DAILY
Qty: 30 TABLET | Refills: 5 | OUTPATIENT
Start: 2022-09-22

## 2022-09-22 RX ORDER — AMLODIPINE BESYLATE 10 MG/1
10 TABLET ORAL DAILY
Qty: 90 TABLET | Refills: 1 | OUTPATIENT
Start: 2022-09-22

## 2022-09-22 RX ORDER — AMLODIPINE BESYLATE 10 MG/1
10 TABLET ORAL DAILY
Qty: 90 TABLET | Refills: 1 | Status: SHIPPED | OUTPATIENT
Start: 2022-09-22

## 2022-09-25 DIAGNOSIS — M62.838 MUSCLE SPASM: ICD-10-CM

## 2022-09-26 RX ORDER — TIZANIDINE 2 MG/1
2 TABLET ORAL EVERY 8 HOURS PRN
Qty: 90 TABLET | Refills: 5 | OUTPATIENT
Start: 2022-09-26

## 2022-10-04 ENCOUNTER — OFFICE VISIT (OUTPATIENT)
Dept: FAMILY MEDICINE CLINIC | Facility: CLINIC | Age: 67
End: 2022-10-04

## 2022-10-04 ENCOUNTER — LAB (OUTPATIENT)
Dept: LAB | Facility: OTHER | Age: 67
End: 2022-10-04

## 2022-10-04 VITALS
DIASTOLIC BLOOD PRESSURE: 78 MMHG | OXYGEN SATURATION: 98 % | HEIGHT: 69 IN | HEART RATE: 66 BPM | WEIGHT: 144.4 LBS | BODY MASS INDEX: 21.39 KG/M2 | SYSTOLIC BLOOD PRESSURE: 124 MMHG

## 2022-10-04 DIAGNOSIS — Z79.899 HIGH RISK MEDICATION USE: Primary | ICD-10-CM

## 2022-10-04 DIAGNOSIS — Z12.2 SCREENING FOR LUNG CANCER: ICD-10-CM

## 2022-10-04 DIAGNOSIS — E55.9 VITAMIN D DEFICIENCY: ICD-10-CM

## 2022-10-04 DIAGNOSIS — Z12.5 PROSTATE CANCER SCREENING: ICD-10-CM

## 2022-10-04 DIAGNOSIS — R73.02 IMPAIRED GLUCOSE TOLERANCE: Chronic | ICD-10-CM

## 2022-10-04 DIAGNOSIS — Z23 NEED FOR VACCINATION: ICD-10-CM

## 2022-10-04 DIAGNOSIS — E78.2 MIXED HYPERLIPIDEMIA: Chronic | ICD-10-CM

## 2022-10-04 DIAGNOSIS — J44.9 CHRONIC OBSTRUCTIVE PULMONARY DISEASE, UNSPECIFIED COPD TYPE: Chronic | ICD-10-CM

## 2022-10-04 DIAGNOSIS — R73.02 IMPAIRED GLUCOSE TOLERANCE: ICD-10-CM

## 2022-10-04 DIAGNOSIS — M62.838 MUSCLE SPASM: Chronic | ICD-10-CM

## 2022-10-04 DIAGNOSIS — I10 ESSENTIAL HYPERTENSION: ICD-10-CM

## 2022-10-04 DIAGNOSIS — Z79.899 HIGH RISK MEDICATION USE: ICD-10-CM

## 2022-10-04 DIAGNOSIS — Z79.899 HIGH RISK MEDICATION USE: Chronic | ICD-10-CM

## 2022-10-04 DIAGNOSIS — Z87.891 PERSONAL HISTORY OF NICOTINE DEPENDENCE: Chronic | ICD-10-CM

## 2022-10-04 DIAGNOSIS — E78.2 MIXED HYPERLIPIDEMIA: ICD-10-CM

## 2022-10-04 DIAGNOSIS — M54.50 CHRONIC MIDLINE LOW BACK PAIN WITHOUT SCIATICA: Chronic | ICD-10-CM

## 2022-10-04 DIAGNOSIS — F17.200 SMOKER: Chronic | ICD-10-CM

## 2022-10-04 DIAGNOSIS — G89.29 CHRONIC MIDLINE LOW BACK PAIN WITHOUT SCIATICA: Chronic | ICD-10-CM

## 2022-10-04 DIAGNOSIS — J31.0 CHRONIC RHINITIS: Chronic | ICD-10-CM

## 2022-10-04 DIAGNOSIS — I10 ESSENTIAL HYPERTENSION: Primary | Chronic | ICD-10-CM

## 2022-10-04 DIAGNOSIS — Z12.2 ENCOUNTER FOR SCREENING FOR LUNG CANCER: ICD-10-CM

## 2022-10-04 LAB
25(OH)D3 SERPL-MCNC: 56.7 NG/ML (ref 30–100)
ALBUMIN SERPL-MCNC: 4.1 G/DL (ref 3.5–5)
ALBUMIN/GLOB SERPL: 1.2 G/DL (ref 1.1–1.8)
ALP SERPL-CCNC: 84 U/L (ref 38–126)
ALT SERPL W P-5'-P-CCNC: 17 U/L
ANION GAP SERPL CALCULATED.3IONS-SCNC: 3 MMOL/L (ref 5–15)
AST SERPL-CCNC: 23 U/L (ref 17–59)
BASOPHILS # BLD AUTO: 0.04 10*3/MM3 (ref 0–0.2)
BASOPHILS NFR BLD AUTO: 0.4 % (ref 0–1.5)
BILIRUB SERPL-MCNC: 0.6 MG/DL (ref 0.2–1.3)
BUN SERPL-MCNC: 6 MG/DL (ref 7–23)
BUN/CREAT SERPL: 9.4 (ref 7–25)
CALCIUM SPEC-SCNC: 9.2 MG/DL (ref 8.4–10.2)
CHLORIDE SERPL-SCNC: 94 MMOL/L (ref 101–112)
CHOLEST SERPL-MCNC: 146 MG/DL (ref 150–200)
CO2 SERPL-SCNC: 32 MMOL/L (ref 22–30)
CREAT SERPL-MCNC: 0.64 MG/DL (ref 0.7–1.3)
DEPRECATED RDW RBC AUTO: 44.2 FL (ref 37–54)
EGFRCR SERPLBLD CKD-EPI 2021: 105.1 ML/MIN/1.73
EOSINOPHIL # BLD AUTO: 0.12 10*3/MM3 (ref 0–0.4)
EOSINOPHIL NFR BLD AUTO: 1.2 % (ref 0.3–6.2)
ERYTHROCYTE [DISTWIDTH] IN BLOOD BY AUTOMATED COUNT: 13.3 % (ref 12.3–15.4)
FOLATE SERPL-MCNC: 9.54 NG/ML (ref 4.78–24.2)
GLOBULIN UR ELPH-MCNC: 3.4 GM/DL (ref 2.3–3.5)
GLUCOSE SERPL-MCNC: 124 MG/DL (ref 70–99)
HBA1C MFR BLD: 5.4 % (ref 4.8–5.6)
HCT VFR BLD AUTO: 47.1 % (ref 37.5–51)
HDLC SERPL-MCNC: 81 MG/DL (ref 40–59)
HGB BLD-MCNC: 16.1 G/DL (ref 13–17.7)
LDLC SERPL CALC-MCNC: 51 MG/DL
LDLC/HDLC SERPL: 0.63 {RATIO} (ref 0–3.55)
LYMPHOCYTES # BLD AUTO: 1.22 10*3/MM3 (ref 0.7–3.1)
LYMPHOCYTES NFR BLD AUTO: 12.6 % (ref 19.6–45.3)
MCH RBC QN AUTO: 31.9 PG (ref 26.6–33)
MCHC RBC AUTO-ENTMCNC: 34.2 G/DL (ref 31.5–35.7)
MCV RBC AUTO: 93.5 FL (ref 79–97)
MONOCYTES # BLD AUTO: 1.31 10*3/MM3 (ref 0.1–0.9)
MONOCYTES NFR BLD AUTO: 13.5 % (ref 5–12)
NEUTROPHILS NFR BLD AUTO: 7.02 10*3/MM3 (ref 1.7–7)
NEUTROPHILS NFR BLD AUTO: 72.3 % (ref 42.7–76)
PLATELET # BLD AUTO: 328 10*3/MM3 (ref 140–450)
PMV BLD AUTO: 9.2 FL (ref 6–12)
POTASSIUM SERPL-SCNC: 4.8 MMOL/L (ref 3.4–5)
PROT SERPL-MCNC: 7.5 G/DL (ref 6.3–8.6)
PSA SERPL-MCNC: 2.06 NG/ML (ref 0–4)
RBC # BLD AUTO: 5.04 10*6/MM3 (ref 4.14–5.8)
SODIUM SERPL-SCNC: 129 MMOL/L (ref 137–145)
TRIGL SERPL-MCNC: 69 MG/DL
TSH SERPL DL<=0.05 MIU/L-ACNC: 0.8 UIU/ML (ref 0.27–4.2)
VIT B12 BLD-MCNC: 361 PG/ML (ref 211–946)
VLDLC SERPL-MCNC: 14 MG/DL (ref 5–40)
WBC NRBC COR # BLD: 9.71 10*3/MM3 (ref 3.4–10.8)

## 2022-10-04 PROCEDURE — 36415 COLL VENOUS BLD VENIPUNCTURE: CPT | Performed by: NURSE PRACTITIONER

## 2022-10-04 PROCEDURE — 80307 DRUG TEST PRSMV CHEM ANLYZR: CPT | Performed by: NURSE PRACTITIONER

## 2022-10-04 PROCEDURE — G0481 DRUG TEST DEF 8-14 CLASSES: HCPCS | Performed by: NURSE PRACTITIONER

## 2022-10-04 PROCEDURE — 83036 HEMOGLOBIN GLYCOSYLATED A1C: CPT | Performed by: NURSE PRACTITIONER

## 2022-10-04 PROCEDURE — 82746 ASSAY OF FOLIC ACID SERUM: CPT | Performed by: NURSE PRACTITIONER

## 2022-10-04 PROCEDURE — G0103 PSA SCREENING: HCPCS | Performed by: NURSE PRACTITIONER

## 2022-10-04 PROCEDURE — 82306 VITAMIN D 25 HYDROXY: CPT | Performed by: NURSE PRACTITIONER

## 2022-10-04 PROCEDURE — 80053 COMPREHEN METABOLIC PANEL: CPT | Performed by: NURSE PRACTITIONER

## 2022-10-04 PROCEDURE — 80061 LIPID PANEL: CPT | Performed by: NURSE PRACTITIONER

## 2022-10-04 PROCEDURE — 82607 VITAMIN B-12: CPT | Performed by: NURSE PRACTITIONER

## 2022-10-04 PROCEDURE — 85025 COMPLETE CBC W/AUTO DIFF WBC: CPT | Performed by: NURSE PRACTITIONER

## 2022-10-04 PROCEDURE — 99214 OFFICE O/P EST MOD 30 MIN: CPT | Performed by: NURSE PRACTITIONER

## 2022-10-04 PROCEDURE — 84443 ASSAY THYROID STIM HORMONE: CPT | Performed by: NURSE PRACTITIONER

## 2022-10-04 RX ORDER — TIZANIDINE 2 MG/1
2 TABLET ORAL EVERY 8 HOURS PRN
Qty: 90 TABLET | Refills: 5 | Status: SHIPPED | OUTPATIENT
Start: 2022-10-04

## 2022-10-04 NOTE — PROGRESS NOTES
CC: Establish Care      Subjective:  Derrell Bynum is a 67 y.o. male who presents for     Patient presents to office to establish care today.  Formerly a patient of JASMINE Becerril.  Patient has hypertension which he takes amlodipine, clonidine, and metoprolol for.  He has hyperlipidemia in which he takes pravastatin for.  He has COPD in which he uses albuterol nebulizer as needed, albuterol inhaler as needed, and Breo for.  He has GERD in which he takes Protonix for.  He does well with this medication.  He has chronic low back pain, osteoarthritis, degenerative disc disease of the lumbar spine he had an MRI on 12/28/2018.  Please see for details.  Patient takes hydrocodone as needed for.  He has chronic rhinitis which is helped with Singulair.  He has impaired glucose tolerance in which is currently controlled with diet. He does smoke 1 ppd for about 50 years.    He is due for lung cancer screening. He states he is willing to have this done.    He is due for Shingrix vaccine and COVID vaccines #3. Is willing to have the shingrix sent to his pharmacy to be given there if covered by his insurance. States is going to get his COVID booster at the pharmacy.      The following portions of the patient's history were reviewed and updated as appropriate: allergies, current medications, past family history, past medical history, past social history, past surgical history and problem list.    Past Medical History:   Diagnosis Date   • Acute bacterial sinusitis    • Acute pharyngitis    • Acute sinusitis    • Allergic rhinitis    • Chronic obstructive pulmonary disease with acute lower respiratory infection (HCC)    • Chronic sinusitis    • Coalworker's pneumoconiosis (HCC)    • Contact dermatitis    • COPD (chronic obstructive pulmonary disease) (HCC)    • Cough    • Dyspnea    • Encounter for immunization    • Erythrocytosis    • Essential hypertension    • Fever    • Hemoptysis    • Hoarse    • Hyperlipidemia    • Insect  bite, nonvenomous of shoulder and upper arm, infected    • Malaise and fatigue     Other   • Need for immunization against influenza    • Osteoarthritis     Chronic daily pain   • Smokes tobacco daily    • Tobacco dependence syndrome    • Viral gastroenteritis 10/10/2013    Resolved   • Wheezing          Current Outpatient Medications:   •  albuterol (PROVENTIL) (2.5 MG/3ML) 0.083% nebulizer solution, Take 2.5 mg by nebulization Every 4 (Four) Hours As Needed for wheezing (Neb tx q6hrs prn cough, congestion, wheezing)., Disp: , Rfl:   •  albuterol sulfate HFA (Ventolin HFA) 108 (90 Base) MCG/ACT inhaler, Inhale 2 puffs Every 6 (Six) Hours As Needed for Wheezing., Disp: 18 g, Rfl: 11  •  amLODIPine (NORVASC) 10 MG tablet, Take 1 tablet by mouth Daily., Disp: 90 tablet, Rfl: 1  •  Ascorbic Acid (VITAMIN C) 500 MG capsule, , Disp: , Rfl:   •  aspirin 81 MG chewable tablet, Chew 81 mg Daily., Disp: , Rfl:   •  CloNIDine (CATAPRES) 0.1 MG tablet, Take 1 tablet by mouth 2 (Two) Times a Day. If needed for B/P 150/90 or higher., Disp: 60 tablet, Rfl: 5  •  fluticasone (FLONASE) 50 MCG/ACT nasal spray, 1 spray into each nostril 2 (Two) Times a Day., Disp: , Rfl:   •  Fluticasone Furoate-Vilanterol (BREO ELLIPTA) 100-25 MCG/INH inhaler, Inhale 1 puff Daily., Disp: 1 each, Rfl: 5  •  HYDROcodone-acetaminophen (Lortab) 7.5-325 MG per tablet, Take 1 tablet by mouth Every 6 (Six) Hours As Needed for Moderate Pain. Fill when due, Disp: 120 tablet, Rfl: 0  •  lisinopril (PRINIVIL,ZESTRIL) 10 MG tablet, Take 1 tablet by mouth Daily., Disp: 30 tablet, Rfl: 5  •  metoprolol succinate XL (Toprol XL) 50 MG 24 hr tablet, Take 1 tablet by mouth Daily. Dose increased on 1/31/22. For high blood pressure, Disp: 90 tablet, Rfl: 1  •  montelukast (SINGULAIR) 10 MG tablet, Take 1 tablet by mouth Every Night., Disp: 90 tablet, Rfl: 1  •  ondansetron ODT (ZOFRAN-ODT) 4 MG disintegrating tablet, , Disp: , Rfl:   •  pantoprazole (PROTONIX) 40 MG EC  "tablet, Take 1 tablet by mouth Every Morning., Disp: 30 tablet, Rfl: 5  •  pravastatin (PRAVACHOL) 20 MG tablet, Take 1 tablet by mouth Daily., Disp: 90 tablet, Rfl: 1  •  tiZANidine (ZANAFLEX) 2 MG tablet, Take 1 tablet by mouth Every 8 (Eight) Hours As Needed for Muscle Spasms., Disp: 90 tablet, Rfl: 5  •  Zoster Vac Recomb Adjuvanted 50 MCG/0.5ML reconstituted suspension, Inject 0.5 mL into the appropriate muscle as directed by prescriber 1 (One) Time for 1 dose., Disp: 1 each, Rfl: 1    Review of Systems    Review of Systems   Constitutional: Negative.    HENT: Negative.    Eyes: Negative.    Respiratory: Negative.    Cardiovascular: Negative.    Gastrointestinal: Negative.    Endocrine: Negative.    Genitourinary: Negative.    Musculoskeletal: Positive for arthralgias and back pain.   Skin: Negative.    Allergic/Immunologic: Negative.    Neurological: Negative.    Hematological: Negative.    Psychiatric/Behavioral: Negative.    All other systems reviewed and are negative.      Objective  Vitals:    10/04/22 0758   BP: 124/78   Pulse: 66   SpO2: 98%   Weight: 65.5 kg (144 lb 6.4 oz)   Height: 175.3 cm (69\")     Body mass index is 21.32 kg/m².    Physical Exam    Physical Exam  Vitals and nursing note reviewed.   Constitutional:       General: He is not in acute distress.     Appearance: Normal appearance. He is not ill-appearing, toxic-appearing or diaphoretic.   HENT:      Head: Normocephalic and atraumatic.   Cardiovascular:      Rate and Rhythm: Normal rate and regular rhythm.      Pulses: Normal pulses.      Heart sounds: Normal heart sounds. No murmur heard.    No friction rub. No gallop.   Pulmonary:      Effort: Pulmonary effort is normal. No respiratory distress.      Breath sounds: Normal breath sounds. No stridor. No wheezing, rhonchi or rales.   Chest:      Chest wall: No tenderness.   Musculoskeletal:      Cervical back: Normal range of motion and neck supple.      Right lower leg: No edema.      " Left lower leg: No edema.   Skin:     General: Skin is warm and dry.      Findings: No rash.   Neurological:      Mental Status: He is alert.             Diagnoses and all orders for this visit:    1. Essential hypertension (Primary)    2. Muscle spasm  -     tiZANidine (ZANAFLEX) 2 MG tablet; Take 1 tablet by mouth Every 8 (Eight) Hours As Needed for Muscle Spasms.  Dispense: 90 tablet; Refill: 5    3. Mixed hyperlipidemia    4. Chronic obstructive pulmonary disease, unspecified COPD type (HCC)    5. Chronic midline low back pain without sciatica  -     Ambulatory Referral to Pain Management    6. Chronic rhinitis    7. Impaired glucose tolerance    8. High risk medication use    9. Encounter for screening for lung cancer    10. Smoker  -     CT Chest Low Dose Wo; Future    11. Screening for lung cancer  -     CT Chest Low Dose Wo; Future    12. Need for vaccination  -     Zoster Vac Recomb Adjuvanted 50 MCG/0.5ML reconstituted suspension; Inject 0.5 mL into the appropriate muscle as directed by prescriber 1 (One) Time for 1 dose.  Dispense: 1 each; Refill: 1    13. Personal history of nicotine dependence   -     CT Chest Low Dose Wo; Future    Patient establish care today.  He has labs ordered.  He is going to have these drawn on his way out today.  We will notify patient of these results once they are available.  Refilled patient's tizanidine today as requested.  He is to continue his other current medications as ordered.  He does not need refills on these today.  For the chronic midline low back pain, will refer to pain management for further evaluation and treatment. Patient understands the risks associated with this controlled medication, including tolerance and addiction.  he also agrees to only obtain this medication from me, and not from a another provider, unless that provider is covering for me in my absence.  he also agrees to be compliant in dosing, and not self adjust the dose of medication.  A signed  controlled substance agreement is on file, and he has received a controlled substance education sheet at this a previous visit.  he has also signed a consent for treatment with a controlled substance as per [facility] policy. TERRY was obtained.  Because patient is a smoker we ordered a CT low-dose of the lungs today.  Patient to schedule this on his way out today.  Smoking cessation counseling of less than 3 minutes performed today.  Patient not interested in smoking cessation at this time.  Counseled on needed immunizations today.  He is willing to have the Shingrix sent to his pharmacy.  States he will get his third COVID immunization at his pharmacy.  Patient to follow-up in 3 months or sooner if needed.  At that time we will perform an annual wellness visit.  Answered all questions.  Patient verbalized understanding of plan of care.            This document has been electronically signed by JASMINE Taylor on October 4, 2022 08:24 CDT

## 2022-10-07 DIAGNOSIS — Z79.899 ENCOUNTER FOR LONG-TERM CURRENT USE OF MEDICATION: ICD-10-CM

## 2022-10-07 DIAGNOSIS — G89.29 CHRONIC MIDLINE LOW BACK PAIN WITHOUT SCIATICA: Chronic | ICD-10-CM

## 2022-10-07 DIAGNOSIS — M54.50 CHRONIC MIDLINE LOW BACK PAIN WITHOUT SCIATICA: Chronic | ICD-10-CM

## 2022-10-07 DIAGNOSIS — E78.2 MIXED HYPERLIPIDEMIA: Chronic | ICD-10-CM

## 2022-10-07 DIAGNOSIS — M47.26 OSTEOARTHRITIS OF SPINE WITH RADICULOPATHY, LUMBAR REGION: Chronic | ICD-10-CM

## 2022-10-07 RX ORDER — HYDROCODONE BITARTRATE AND ACETAMINOPHEN 7.5; 325 MG/1; MG/1
1 TABLET ORAL EVERY 6 HOURS PRN
Qty: 120 TABLET | Refills: 0 | Status: SHIPPED | OUTPATIENT
Start: 2022-10-07

## 2022-10-11 LAB
6MAM UR QL CFM: NEGATIVE
6MAM/CREAT UR: NOT DETECTED NG/MG CREAT
7AMINOCLONAZEPAM/CREAT UR: NOT DETECTED NG/MG CREAT
A-OH ALPRAZ/CREAT UR: NOT DETECTED NG/MG CREAT
A-OH-TRIAZOLAM/CREAT UR CFM: NOT DETECTED NG/MG CREAT
ALFENTANIL/CREAT UR CFM: NOT DETECTED NG/MG CREAT
ALPHA-HYDROXYMIDAZOLAM, URINE: NOT DETECTED NG/MG CREAT
ALPRAZ/CREAT UR CFM: NOT DETECTED NG/MG CREAT
AMOBARBITAL UR QL CFM: NOT DETECTED
AMPHET/CREAT UR: NOT DETECTED NG/MG CREAT
AMPHETAMINES UR QL CFM: NEGATIVE
BARBITAL UR QL CFM: NOT DETECTED
BARBITURATES UR QL CFM: NEGATIVE
BENZODIAZ UR QL CFM: NEGATIVE
BUPRENORPHINE UR QL CFM: NEGATIVE
BUPRENORPHINE/CREAT UR: NOT DETECTED NG/MG CREAT
BUTABARBITAL UR QL CFM: NOT DETECTED
BUTALBITAL UR QL CFM: NOT DETECTED
BZE/CREAT UR: NOT DETECTED NG/MG CREAT
CANNABINOIDS UR QL CFM: NEGATIVE
CARBOXYTHC/CREAT UR: NOT DETECTED NG/MG CREAT
CLONAZEPAM/CREAT UR CFM: NOT DETECTED NG/MG CREAT
COCAETHYLENE/CREAT UR CFM: NOT DETECTED NG/MG CREAT
COCAINE UR QL CFM: NEGATIVE
COCAINE/CREAT UR CFM: NOT DETECTED NG/MG CREAT
CODEINE/CREAT UR: NOT DETECTED NG/MG CREAT
CREAT UR-MCNC: 198 MG/DL
DESALKYLFLURAZ/CREAT UR: NOT DETECTED NG/MG CREAT
DESMETHYLFLUNITRAZEPAM: NOT DETECTED NG/MG CREAT
DHC/CREAT UR: 210 NG/MG CREAT
DIAZEPAM/CREAT UR: NOT DETECTED NG/MG CREAT
DRUGS UR: NORMAL
EDDP/CREAT UR: NOT DETECTED NG/MG CREAT
ETHANOL UR CFM-MCNC: NOT DETECTED G/DL
ETHANOL UR QL CFM: NEGATIVE
FENTANYL UR QL CFM: NEGATIVE
FENTANYL/CREAT UR: NOT DETECTED NG/MG CREAT
FLUNITRAZEPAM UR QL CFM: NOT DETECTED NG/MG CREAT
HYDROCODONE/CREAT UR: 1601 NG/MG CREAT
HYDROMORPHONE/CREAT UR: 853 NG/MG CREAT
LORAZEPAM/CREAT UR: NOT DETECTED NG/MG CREAT
MDA/CREAT UR: NOT DETECTED NG/MG CREAT
MDMA/CREAT UR: NOT DETECTED NG/MG CREAT
MEPHOBARBITAL UR QL CFM: NOT DETECTED
METHADONE UR QL CFM: NEGATIVE
METHADONE/CREAT UR: NOT DETECTED NG/MG CREAT
METHAMPHET/CREAT UR: NOT DETECTED NG/MG CREAT
MIDAZOLAM/CREAT UR CFM: NOT DETECTED NG/MG CREAT
MORPHINE/CREAT UR: NOT DETECTED NG/MG CREAT
N-NORTRAMADOL/CREAT UR CFM: NOT DETECTED NG/MG CREAT
NARCOTICS UR: NEGATIVE
NORBUPRENORPHINE/CREAT UR: NOT DETECTED NG/MG CREAT
NORCODEINE/CREAT UR CFM: NOT DETECTED NG/MG CREAT
NORDIAZEPAM/CREAT UR: NOT DETECTED NG/MG CREAT
NORFENTANYL/CREAT UR: NOT DETECTED NG/MG CREAT
NORHYDROCODONE/CREAT UR: 1323 NG/MG CREAT
NORMORPHINE UR-MCNC: NOT DETECTED NG/MG CREAT
NOROXYCODONE/CREAT UR: NOT DETECTED NG/MG CREAT
NOROXYMORPHONE/CREAT UR CFM: NOT DETECTED NG/MG CREAT
O-NORTRAMADOL UR CFM-MCNC: NOT DETECTED NG/MG CREAT
OPIATES UR QL CFM: NORMAL
OXAZEPAM/CREAT UR: NOT DETECTED NG/MG CREAT
OXYCODONE UR QL CFM: NEGATIVE
OXYCODONE/CREAT UR: NOT DETECTED NG/MG CREAT
OXYMORPHONE/CREAT UR: NOT DETECTED NG/MG CREAT
PENTOBARB UR QL CFM: NOT DETECTED
PHENOBARB UR QL CFM: NOT DETECTED
SECOBARBITAL UR QL CFM: NOT DETECTED
SERVICE CMNT 02-IMP: NORMAL
SUFENTANIL/CREAT UR CFM: NOT DETECTED NG/MG CREAT
TAPENTADOL UR QL CFM: NEGATIVE
TAPENTADOL/CREAT UR: NOT DETECTED NG/MG CREAT
TEMAZEPAM/CREAT UR: NOT DETECTED NG/MG CREAT
THIOPENTAL UR QL CFM: NOT DETECTED
TRAMADOL UR QL CFM: NOT DETECTED NG/MG CREAT

## 2022-10-13 DIAGNOSIS — K21.00 GASTROESOPHAGEAL REFLUX DISEASE WITH ESOPHAGITIS WITHOUT HEMORRHAGE: ICD-10-CM

## 2022-10-14 RX ORDER — PANTOPRAZOLE SODIUM 40 MG/1
40 TABLET, DELAYED RELEASE ORAL EVERY MORNING
Qty: 30 TABLET | Refills: 5 | Status: SHIPPED | OUTPATIENT
Start: 2022-10-14

## 2022-10-17 ENCOUNTER — CLINICAL SUPPORT (OUTPATIENT)
Dept: FAMILY MEDICINE CLINIC | Facility: CLINIC | Age: 67
End: 2022-10-17

## 2022-10-17 DIAGNOSIS — Z23 NEED FOR VACCINATION: Primary | ICD-10-CM

## 2022-10-17 PROCEDURE — 90750 HZV VACC RECOMBINANT IM: CPT | Performed by: NURSE PRACTITIONER

## 2022-10-31 ENCOUNTER — TRANSCRIBE ORDERS (OUTPATIENT)
Dept: GENERAL RADIOLOGY | Facility: CLINIC | Age: 67
End: 2022-10-31

## 2022-10-31 DIAGNOSIS — M54.50 LOW BACK PAIN, UNSPECIFIED BACK PAIN LATERALITY, UNSPECIFIED CHRONICITY, UNSPECIFIED WHETHER SCIATICA PRESENT: Primary | ICD-10-CM

## 2022-11-20 DIAGNOSIS — I10 ESSENTIAL HYPERTENSION: ICD-10-CM

## 2022-11-21 RX ORDER — LISINOPRIL 10 MG/1
10 TABLET ORAL DAILY
Qty: 30 TABLET | Refills: 5 | OUTPATIENT
Start: 2022-11-21

## 2022-12-19 DIAGNOSIS — I10 ESSENTIAL HYPERTENSION: ICD-10-CM

## 2022-12-21 RX ORDER — AMLODIPINE BESYLATE 10 MG/1
10 TABLET ORAL DAILY
Qty: 90 TABLET | Refills: 1 | OUTPATIENT
Start: 2022-12-21

## 2023-01-04 ENCOUNTER — OFFICE VISIT (OUTPATIENT)
Dept: FAMILY MEDICINE CLINIC | Facility: CLINIC | Age: 68
End: 2023-01-04
Payer: MEDICARE

## 2023-01-04 VITALS
HEART RATE: 72 BPM | WEIGHT: 148.4 LBS | DIASTOLIC BLOOD PRESSURE: 82 MMHG | BODY MASS INDEX: 21.98 KG/M2 | HEIGHT: 69 IN | SYSTOLIC BLOOD PRESSURE: 128 MMHG | OXYGEN SATURATION: 96 %
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
## 2023-01-04 DIAGNOSIS — M54.50 CHRONIC MIDLINE LOW BACK PAIN WITHOUT SCIATICA: Chronic | ICD-10-CM

## 2023-01-04 DIAGNOSIS — I10 ESSENTIAL HYPERTENSION: Chronic | ICD-10-CM

## 2023-01-04 DIAGNOSIS — R07.81 RIB PAIN ON LEFT SIDE: ICD-10-CM

## 2023-01-04 DIAGNOSIS — K21.9 GASTROESOPHAGEAL REFLUX DISEASE WITHOUT ESOPHAGITIS: Chronic | ICD-10-CM

## 2023-01-04 DIAGNOSIS — M47.26 OSTEOARTHRITIS OF SPINE WITH RADICULOPATHY, LUMBAR REGION: Chronic | ICD-10-CM

## 2023-01-04 DIAGNOSIS — Z87.891 PERSONAL HISTORY OF NICOTINE DEPENDENCE: Chronic | ICD-10-CM

## 2023-01-04 DIAGNOSIS — M51.36 DDD (DEGENERATIVE DISC DISEASE), LUMBAR: Chronic | ICD-10-CM

## 2023-01-04 DIAGNOSIS — G89.29 CHRONIC MIDLINE LOW BACK PAIN WITHOUT SCIATICA: Chronic | ICD-10-CM

## 2023-01-04 DIAGNOSIS — R73.02 IMPAIRED GLUCOSE TOLERANCE: Chronic | ICD-10-CM

## 2023-01-04 DIAGNOSIS — J44.9 CHRONIC OBSTRUCTIVE PULMONARY DISEASE, UNSPECIFIED COPD TYPE: Chronic | ICD-10-CM

## 2023-01-04 DIAGNOSIS — Z00.00 MEDICARE ANNUAL WELLNESS VISIT, SUBSEQUENT: Primary | ICD-10-CM

## 2023-01-04 PROCEDURE — 1159F MED LIST DOCD IN RCRD: CPT | Performed by: NURSE PRACTITIONER

## 2023-01-04 PROCEDURE — 99214 OFFICE O/P EST MOD 30 MIN: CPT | Performed by: NURSE PRACTITIONER

## 2023-01-04 PROCEDURE — 1170F FXNL STATUS ASSESSED: CPT | Performed by: NURSE PRACTITIONER

## 2023-01-04 PROCEDURE — G0439 PPPS, SUBSEQ VISIT: HCPCS | Performed by: NURSE PRACTITIONER

## 2023-01-04 PROCEDURE — 1160F RVW MEDS BY RX/DR IN RCRD: CPT | Performed by: NURSE PRACTITIONER

## 2023-01-04 PROCEDURE — 96160 PT-FOCUSED HLTH RISK ASSMT: CPT | Performed by: NURSE PRACTITIONER

## 2023-01-04 NOTE — PROGRESS NOTES
The ABCs of the Annual Wellness Visit  Subsequent Medicare Wellness Visit    Subjective        Derrell Bynum is a 67 y.o. male who presents for a Subsequent Medicare Wellness Visit.    The following portions of the patient's history were reviewed and   updated as appropriate: allergies, current medications, past family history, past medical history, past social history, past surgical history and problem list.    Compared to one year ago, the patient feels his physical   health is the same.    Compared to one year ago, the patient feels his mental   health is the same.    Recent Hospitalizations:  He was not admitted to the hospital during the last year.       Current Medical Providers:  Patient Care Team:  Hermelinda Diaz APRN as PCP - General (Family Medicine)    Outpatient Medications Prior to Visit   Medication Sig Dispense Refill   • albuterol (PROVENTIL) (2.5 MG/3ML) 0.083% nebulizer solution Take 2.5 mg by nebulization Every 4 (Four) Hours As Needed for wheezing (Neb tx q6hrs prn cough, congestion, wheezing).     • albuterol sulfate HFA (Ventolin HFA) 108 (90 Base) MCG/ACT inhaler Inhale 2 puffs Every 6 (Six) Hours As Needed for Wheezing. 18 g 11   • amLODIPine (NORVASC) 10 MG tablet Take 1 tablet by mouth Daily. 90 tablet 1   • Ascorbic Acid (VITAMIN C) 500 MG capsule      • aspirin 81 MG chewable tablet Chew 81 mg Daily.     • CloNIDine (CATAPRES) 0.1 MG tablet Take 1 tablet by mouth 2 (Two) Times a Day. If needed for B/P 150/90 or higher. 60 tablet 5   • fluticasone (FLONASE) 50 MCG/ACT nasal spray 1 spray into each nostril 2 (Two) Times a Day.     • Fluticasone Furoate-Vilanterol (BREO ELLIPTA) 100-25 MCG/INH inhaler Inhale 1 puff Daily. 1 each 5   • HYDROcodone-acetaminophen (Lortab) 7.5-325 MG per tablet Take 1 tablet by mouth Every 6 (Six) Hours As Needed for Moderate Pain. Fill when due 120 tablet 0   • lisinopril (PRINIVIL,ZESTRIL) 10 MG tablet Take 1 tablet by mouth Daily. 30 tablet 5   •  metoprolol succinate XL (Toprol XL) 50 MG 24 hr tablet Take 1 tablet by mouth Daily. Dose increased on 1/31/22. For high blood pressure 90 tablet 1   • montelukast (SINGULAIR) 10 MG tablet Take 1 tablet by mouth Every Night. 90 tablet 1   • ondansetron ODT (ZOFRAN-ODT) 4 MG disintegrating tablet      • pantoprazole (PROTONIX) 40 MG EC tablet Take 1 tablet by mouth Every Morning. 30 tablet 5   • pravastatin (PRAVACHOL) 20 MG tablet Take 1 tablet by mouth Daily. 90 tablet 1   • tiZANidine (ZANAFLEX) 2 MG tablet Take 1 tablet by mouth Every 8 (Eight) Hours As Needed for Muscle Spasms. 90 tablet 5     No facility-administered medications prior to visit.       Opioid medication/s are on active medication list.  and I have evaluated his active treatment plan and pain score trends (see table).  Vitals:    01/04/23 0806   PainSc:   6   PainLoc: Rib Cage     I have reviewed the chart for potential of high risk medication and harmful drug interactions in the elderly.            Aspirin is on active medication list. Aspirin use is indicated based on review of current medical condition/s. Pros and cons of this therapy have been discussed today. Benefits of this medication outweigh potential harm.  Patient has been encouraged to continue taking this medication.  .      Patient Active Problem List   Diagnosis   • Coalworker's pneumoconiosis (HCC)   • Chronic obstructive pulmonary disease (HCC)   • Essential hypertension   • Polycythemia   • Muscle spasm   • Osteoarthritis of spine   • High risk medication use   • Chronic midline low back pain without sciatica   • Encounter for long-term use of opiate analgesic   • Smoking   • Abnormal x-ray of lumbar spine   • Mixed hyperlipidemia   • Chronic nonseasonal allergic rhinitis due to pollen   • Epigastric pain   • Screen for colon cancer   • Alcohol-induced acute pancreatitis   • Chronic rhinitis   • Impaired glucose tolerance   • Personal history of nicotine dependence    •  Gastroesophageal reflux disease without esophagitis   • DDD (degenerative disc disease), lumbar     Advance Care Planning  Advance Directive is not on file.  ACP discussion was held with the patient during this visit. Patient does not have an advance directive, declines further assistance.     Objective    Vitals:    01/04/23 0806   BP: 128/82   Pulse: 72   SpO2: 96%   Weight: 67.3 kg (148 lb 6.4 oz)   Height: 175.3 cm (69\")   PainSc:   6   PainLoc: Rib Cage     Estimated body mass index is 21.91 kg/m² as calculated from the following:    Height as of this encounter: 175.3 cm (69\").    Weight as of this encounter: 67.3 kg (148 lb 6.4 oz).    BMI is within normal parameters. No other follow-up for BMI required.      Does the patient have evidence of cognitive impairment?   No            HEALTH RISK ASSESSMENT    Smoking Status:  Social History     Tobacco Use   Smoking Status Every Day   • Packs/day: 1.00   • Years: 35.00   • Pack years: 35.00   • Types: Cigarettes   Smokeless Tobacco Never   Tobacco Comments    20-39 cigarettes/day (5/9/16)     Alcohol Consumption:  Social History     Substance and Sexual Activity   Alcohol Use Yes    Comment: Beer - 8 drinks a day. A  typical drinking day (5/9/16)     Fall Risk Screen:    STEADI Fall Risk Assessment was completed, and patient is at LOW risk for falls.Assessment completed on:1/4/2023    Depression Screening:  PHQ-2/PHQ-9 Depression Screening 1/4/2023   Little Interest or Pleasure in Doing Things 0-->not at all   Feeling Down, Depressed or Hopeless 0-->not at all   PHQ-9: Brief Depression Severity Measure Score 0       Health Habits and Functional and Cognitive Screening:  Functional & Cognitive Status 1/4/2023   Do you have difficulty preparing food and eating? No   Do you have difficulty bathing yourself, getting dressed or grooming yourself? No   Do you have difficulty using the toilet? No   Do you have difficulty moving around from place to place? No   Do you  have trouble with steps or getting out of a bed or a chair? No   Current Diet Well Balanced Diet   Dental Exam Not up to date   Eye Exam Not up to date        Eye Exam Comment -   Exercise (times per week) 7 times per week   Current Exercises Include Walking   Current Exercise Activities Include -   Do you need help using the phone?  No   Are you deaf or do you have serious difficulty hearing?  No   Do you need help with transportation? No   Do you need help shopping? No   Do you need help preparing meals?  No   Do you need help with housework?  No   Do you need help with laundry? No   Do you need help taking your medications? No   Do you need help managing money? No   Do you ever drive or ride in a car without wearing a seat belt? No   Have you felt unusual stress, anger or loneliness in the last month? No   Who do you live with? Spouse   If you need help, do you have trouble finding someone available to you? No   Have you been bothered in the last four weeks by sexual problems? No   Do you have difficulty concentrating, remembering or making decisions? No       Age-appropriate Screening Schedule:  Refer to the list below for future screening recommendations based on patient's age, sex and/or medical conditions. Orders for these recommended tests are listed in the plan section. The patient has been provided with a written plan.    Health Maintenance   Topic Date Due   • LIPID PANEL  10/04/2023   • TDAP/TD VACCINES (2 - Td or Tdap) 05/22/2027   • INFLUENZA VACCINE  Completed   • ZOSTER VACCINE  Addressed                CMS Preventative Services Quick Reference  Risk Factors Identified During Encounter:    Immunizations Discussed/Encouraged: Prevnar 20 (Pneumococcal 20-valent conjugate) and COVID19    The above risks/problems have been discussed with the patient.  Pertinent information has been shared with the patient in the After Visit Summary.    Diagnoses and all orders for this visit:    1. Medicare annual  wellness visit, subsequent (Primary)    2. Essential hypertension    3. Gastroesophageal reflux disease without esophagitis    4. Chronic midline low back pain without sciatica    5. DDD (degenerative disc disease), lumbar    6. Osteoarthritis of spine with radiculopathy, lumbar region    7. Chronic obstructive pulmonary disease, unspecified COPD type (HCC)    8. Impaired glucose tolerance    9. Personal history of nicotine dependence         Follow Up:   Next Medicare Wellness visit to be scheduled in 1 year.      An After Visit Summary and PPPS were made available to the patient.

## 2023-01-04 NOTE — PROGRESS NOTES
CC: Follow-up (3 month FU, has already seen pain management, slipped out of shower 2 weeks ago, and broke some ribs ) and Medicare Wellness-subsequent      Subjective:  Derrell Bynum is a 67 y.o. male who presents for         Pt presents to office for follow up on medical conditions. He has HTN he takes Norvasc, Clonidine, and metoprolol for. He has COPD. He uses albuterol nebs, albuterol inhaler, and Breo inhaler for. He states his breathing is at baseline. He is a patient at [facility]. He has GERD in which he takes Protonix for. He has Chronic lower back pain, DDD of the lumbar spine, and OA of the spine. He is under the care of pain management for and takes Norco for. He has IGT in which he monitors his intake of carbs. He continues to smoke 1 ppd x 50 years.    He is due for pneumonia and COVID 3 immunizations today. He states he has had the pneumonia shot in the past and doesn't need this today. Will get COVID 3 at his pharmacy.    Pt states that he slipped out of the shower 2 weeks ago and feels like he broke some ribs. States he landed on his left side. Didn't hit his head or LOC. States it knocked the breath out of him. Pain is sharp and intermittent. States it is improving with time. Rates at 6 on pain scale. Doesn't radiate. His pain medication has helped with the pain.      The following portions of the patient's history were reviewed and updated as appropriate: allergies, current medications, past family history, past medical history, past social history, past surgical history and problem list.    Past Medical History:   Diagnosis Date   • Acute bacterial sinusitis    • Acute pharyngitis    • Acute sinusitis    • Allergic rhinitis    • Chronic obstructive pulmonary disease with acute lower respiratory infection (HCC)    • Chronic sinusitis    • Coalworker's pneumoconiosis (HCC)    • Contact dermatitis    • COPD (chronic obstructive pulmonary disease) (HCC)    • Cough    • Dyspnea    •  Encounter for immunization    • Erythrocytosis    • Essential hypertension    • Fever    • Hemoptysis    • Hoarse    • Hyperlipidemia    • Insect bite, nonvenomous of shoulder and upper arm, infected    • Malaise and fatigue     Other   • Need for immunization against influenza    • Osteoarthritis     Chronic daily pain   • Smokes tobacco daily    • Tobacco dependence syndrome    • Viral gastroenteritis 10/10/2013    Resolved   • Wheezing          Current Outpatient Medications:   •  albuterol (PROVENTIL) (2.5 MG/3ML) 0.083% nebulizer solution, Take 2.5 mg by nebulization Every 4 (Four) Hours As Needed for wheezing (Neb tx q6hrs prn cough, congestion, wheezing)., Disp: , Rfl:   •  albuterol sulfate HFA (Ventolin HFA) 108 (90 Base) MCG/ACT inhaler, Inhale 2 puffs Every 6 (Six) Hours As Needed for Wheezing., Disp: 18 g, Rfl: 11  •  amLODIPine (NORVASC) 10 MG tablet, Take 1 tablet by mouth Daily., Disp: 90 tablet, Rfl: 1  •  Ascorbic Acid (VITAMIN C) 500 MG capsule, , Disp: , Rfl:   •  aspirin 81 MG chewable tablet, Chew 81 mg Daily., Disp: , Rfl:   •  CloNIDine (CATAPRES) 0.1 MG tablet, Take 1 tablet by mouth 2 (Two) Times a Day. If needed for B/P 150/90 or higher., Disp: 60 tablet, Rfl: 5  •  fluticasone (FLONASE) 50 MCG/ACT nasal spray, 1 spray into each nostril 2 (Two) Times a Day., Disp: , Rfl:   •  Fluticasone Furoate-Vilanterol (BREO ELLIPTA) 100-25 MCG/INH inhaler, Inhale 1 puff Daily., Disp: 1 each, Rfl: 5  •  HYDROcodone-acetaminophen (Lortab) 7.5-325 MG per tablet, Take 1 tablet by mouth Every 6 (Six) Hours As Needed for Moderate Pain. Fill when due, Disp: 120 tablet, Rfl: 0  •  lisinopril (PRINIVIL,ZESTRIL) 10 MG tablet, Take 1 tablet by mouth Daily., Disp: 30 tablet, Rfl: 5  •  metoprolol succinate XL (Toprol XL) 50 MG 24 hr tablet, Take 1 tablet by mouth Daily. Dose increased on 1/31/22. For high blood pressure, Disp: 90 tablet, Rfl: 1  •  montelukast (SINGULAIR) 10 MG tablet, Take 1 tablet by mouth Every  Night., Disp: 90 tablet, Rfl: 1  •  ondansetron ODT (ZOFRAN-ODT) 4 MG disintegrating tablet, , Disp: , Rfl:   •  pantoprazole (PROTONIX) 40 MG EC tablet, Take 1 tablet by mouth Every Morning., Disp: 30 tablet, Rfl: 5  •  pravastatin (PRAVACHOL) 20 MG tablet, Take 1 tablet by mouth Daily., Disp: 90 tablet, Rfl: 1  •  tiZANidine (ZANAFLEX) 2 MG tablet, Take 1 tablet by mouth Every 8 (Eight) Hours As Needed for Muscle Spasms., Disp: 90 tablet, Rfl: 5    Review of Systems    Review of Systems   Constitutional: Negative.    HENT: Negative.    Eyes: Negative.    Respiratory: Negative.    Cardiovascular: Negative.    Gastrointestinal: Negative.    Endocrine: Negative.    Genitourinary: Negative.    Musculoskeletal: Positive for back pain.        Chest wall pain on left   Skin: Negative.    Allergic/Immunologic: Negative.    Neurological: Negative.    Hematological: Negative.    Psychiatric/Behavioral: Negative.    All other systems reviewed and are negative.      Objective  Vitals:    01/04/23 0806   BP: 128/82   Pulse: 72   SpO2: 96%   Weight: 67.3 kg (148 lb 6.4 oz)   Height: 175.3 cm (69\")     Body mass index is 21.91 kg/m².    Physical Exam    Physical Exam  Vitals and nursing note reviewed.   Constitutional:       General: He is not in acute distress.     Appearance: Normal appearance. He is not ill-appearing, toxic-appearing or diaphoretic.   HENT:      Head: Normocephalic and atraumatic.   Cardiovascular:      Rate and Rhythm: Normal rate and regular rhythm.      Pulses: Normal pulses.      Heart sounds: Normal heart sounds. No murmur heard.    No friction rub. No gallop.   Pulmonary:      Effort: Pulmonary effort is normal. No respiratory distress.      Breath sounds: Normal breath sounds. No stridor. No wheezing, rhonchi or rales.      Comments: Pt has pain on palpation of the Lt posterior chest.  Chest:      Chest wall: No tenderness.   Musculoskeletal:      Cervical back: Normal range of motion and neck supple.       Right lower leg: No edema.      Left lower leg: No edema.   Skin:     General: Skin is warm and dry.      Findings: No rash.   Neurological:      Mental Status: He is alert.             Diagnoses and all orders for this visit:    1. Medicare annual wellness visit, subsequent (Primary)    2. Essential hypertension    3. Gastroesophageal reflux disease without esophagitis    4. Chronic midline low back pain without sciatica    5. DDD (degenerative disc disease), lumbar    6. Osteoarthritis of spine with radiculopathy, lumbar region    7. Chronic obstructive pulmonary disease, unspecified COPD type (HCC)    8. Impaired glucose tolerance    9. Personal history of nicotine dependence     10. Rib pain on left side  -     XR Ribs Left With PA Chest; Future    11. Fall, initial encounter  -     XR Ribs Left With PA Chest; Future       Patient in for annual wellness visit today.  Labs are up-to-date.  We will check labs at next appointment.  Patient to continue his current medications.  He states he does not need refills at this time.  Patient to continue with pain management for his pain.  He is counseled on needed immunizations today.  He states he has had a pneumonia vaccine in the past.  He states he will get his COVID #3 through his pharmacy.  He is having rib pain after a fall approximately 2 weeks ago.  We will check a x-ray on patient's way out today and inform him of the results once those are available.  Smoking cessation counseling of less than 3 minutes provided today.  Patient is not interested in smoking cessation at this time.  He is to follow-up in 6 months or sooner if needed for annual physical exam with labs.  Answered all questions.  Patient verbalized understanding of plan of care.          This document has been electronically signed by JASMINE Taylor on January 4, 2023 08:49 CST

## 2023-01-19 ENCOUNTER — OFFICE VISIT (OUTPATIENT)
Dept: FAMILY MEDICINE CLINIC | Facility: CLINIC | Age: 68
End: 2023-01-19
Payer: MEDICARE

## 2023-01-19 VITALS
WEIGHT: 144.6 LBS | HEIGHT: 69 IN | OXYGEN SATURATION: 95 % | DIASTOLIC BLOOD PRESSURE: 80 MMHG | SYSTOLIC BLOOD PRESSURE: 126 MMHG | HEART RATE: 83 BPM | BODY MASS INDEX: 21.42 KG/M2

## 2023-01-19 DIAGNOSIS — R05.1 ACUTE COUGH: Primary | ICD-10-CM

## 2023-01-19 DIAGNOSIS — R06.02 SOB (SHORTNESS OF BREATH): ICD-10-CM

## 2023-01-19 DIAGNOSIS — J90 PLEURAL EFFUSION ON LEFT: ICD-10-CM

## 2023-01-19 DIAGNOSIS — R06.2 WHEEZING: ICD-10-CM

## 2023-01-19 DIAGNOSIS — J44.1 ACUTE EXACERBATION OF CHRONIC OBSTRUCTIVE PULMONARY DISEASE (COPD): ICD-10-CM

## 2023-01-19 PROCEDURE — 99214 OFFICE O/P EST MOD 30 MIN: CPT | Performed by: NURSE PRACTITIONER

## 2023-01-19 PROCEDURE — 96372 THER/PROPH/DIAG INJ SC/IM: CPT | Performed by: NURSE PRACTITIONER

## 2023-01-19 RX ORDER — PREDNISONE 20 MG/1
20 TABLET ORAL 2 TIMES DAILY
Qty: 10 TABLET | Refills: 0 | Status: SHIPPED | OUTPATIENT
Start: 2023-01-19 | End: 2023-01-24

## 2023-01-19 RX ORDER — TRIAMCINOLONE ACETONIDE 40 MG/ML
40 INJECTION, SUSPENSION INTRA-ARTICULAR; INTRAMUSCULAR ONCE
Status: COMPLETED | OUTPATIENT
Start: 2023-01-19 | End: 2023-01-19

## 2023-01-19 RX ORDER — AZITHROMYCIN 250 MG/1
TABLET, FILM COATED ORAL
Qty: 6 TABLET | Refills: 0 | Status: SHIPPED | OUTPATIENT
Start: 2023-01-19 | End: 2023-01-26

## 2023-01-19 RX ADMIN — TRIAMCINOLONE ACETONIDE 40 MG: 40 INJECTION, SUSPENSION INTRA-ARTICULAR; INTRAMUSCULAR at 10:43

## 2023-01-19 NOTE — PROGRESS NOTES
CC: Pneumonia (Wheezing, spitting up flem. Started Saturday )      Subjective:  Derrell Bynum is a 67 y.o. male who presents for         Patient presents to office with 5-day onset of wheezing and coughing up thick, white phlegm.  He is concerned that he may have pneumonia. He has associated wheezing, SOB, nasal congestion and weakness. Denies associated fever, chills, n/v, or diarrhea. No noted aggravating or alleviating factors. No treatment prior to arrival.      The following portions of the patient's history were reviewed and updated as appropriate: allergies, current medications, past family history, past medical history, past social history, past surgical history and problem list.    Past Medical History:   Diagnosis Date   • Acute bacterial sinusitis    • Acute pharyngitis    • Acute sinusitis    • Allergic rhinitis    • Chronic obstructive pulmonary disease with acute lower respiratory infection (HCC)    • Chronic sinusitis    • Coalworker's pneumoconiosis (HCC)    • Contact dermatitis    • COPD (chronic obstructive pulmonary disease) (HCC)    • Cough    • Dyspnea    • Encounter for immunization    • Erythrocytosis    • Essential hypertension    • Fever    • Hemoptysis    • Hoarse    • Hyperlipidemia    • Insect bite, nonvenomous of shoulder and upper arm, infected    • Malaise and fatigue     Other   • Need for immunization against influenza    • Osteoarthritis     Chronic daily pain   • Smokes tobacco daily    • Tobacco dependence syndrome    • Viral gastroenteritis 10/10/2013    Resolved   • Wheezing          Current Outpatient Medications:   •  albuterol (PROVENTIL) (2.5 MG/3ML) 0.083% nebulizer solution, Take 2.5 mg by nebulization Every 4 (Four) Hours As Needed for wheezing (Neb tx q6hrs prn cough, congestion, wheezing)., Disp: , Rfl:   •  albuterol sulfate HFA (Ventolin HFA) 108 (90 Base) MCG/ACT inhaler, Inhale 2 puffs Every 6 (Six) Hours As Needed for Wheezing., Disp: 18 g, Rfl: 11  •   amLODIPine (NORVASC) 10 MG tablet, Take 1 tablet by mouth Daily., Disp: 90 tablet, Rfl: 1  •  Ascorbic Acid (VITAMIN C) 500 MG capsule, , Disp: , Rfl:   •  aspirin 81 MG chewable tablet, Chew 81 mg Daily., Disp: , Rfl:   •  CloNIDine (CATAPRES) 0.1 MG tablet, Take 1 tablet by mouth 2 (Two) Times a Day. If needed for B/P 150/90 or higher., Disp: 60 tablet, Rfl: 5  •  fluticasone (FLONASE) 50 MCG/ACT nasal spray, 1 spray into each nostril 2 (Two) Times a Day., Disp: , Rfl:   •  Fluticasone Furoate-Vilanterol (BREO ELLIPTA) 100-25 MCG/INH inhaler, Inhale 1 puff Daily., Disp: 1 each, Rfl: 5  •  HYDROcodone-acetaminophen (Lortab) 7.5-325 MG per tablet, Take 1 tablet by mouth Every 6 (Six) Hours As Needed for Moderate Pain. Fill when due, Disp: 120 tablet, Rfl: 0  •  lisinopril (PRINIVIL,ZESTRIL) 10 MG tablet, Take 1 tablet by mouth Daily., Disp: 30 tablet, Rfl: 5  •  metoprolol succinate XL (Toprol XL) 50 MG 24 hr tablet, Take 1 tablet by mouth Daily. Dose increased on 1/31/22. For high blood pressure, Disp: 90 tablet, Rfl: 1  •  montelukast (SINGULAIR) 10 MG tablet, Take 1 tablet by mouth Every Night., Disp: 90 tablet, Rfl: 1  •  ondansetron ODT (ZOFRAN-ODT) 4 MG disintegrating tablet, , Disp: , Rfl:   •  pantoprazole (PROTONIX) 40 MG EC tablet, Take 1 tablet by mouth Every Morning., Disp: 30 tablet, Rfl: 5  •  pravastatin (PRAVACHOL) 20 MG tablet, Take 1 tablet by mouth Daily., Disp: 90 tablet, Rfl: 1  •  tiZANidine (ZANAFLEX) 2 MG tablet, Take 1 tablet by mouth Every 8 (Eight) Hours As Needed for Muscle Spasms., Disp: 90 tablet, Rfl: 5  •  azithromycin (Zithromax Z-Piter) 250 MG tablet, Take 2 tablets the first day, then 1 tablet daily for 4 days., Disp: 6 tablet, Rfl: 0  •  predniSONE (DELTASONE) 20 MG tablet, Take 1 tablet by mouth 2 (Two) Times a Day for 5 days., Disp: 10 tablet, Rfl: 0  No current facility-administered medications for this visit.    Review of Systems    Review of Systems   Constitutional: Negative.   "  HENT: Negative.    Eyes: Negative.    Respiratory: Positive for cough, chest tightness, shortness of breath and wheezing.    Cardiovascular: Negative.    Gastrointestinal: Negative.    Endocrine: Negative.    Genitourinary: Negative.    Musculoskeletal: Negative.    Skin: Negative.    Allergic/Immunologic: Negative.    Neurological: Negative.    Hematological: Negative.    Psychiatric/Behavioral: Negative.    All other systems reviewed and are negative.      Objective  Vitals:    01/19/23 0923   BP: 126/80   Pulse: 83   SpO2: 95%   Weight: 65.6 kg (144 lb 9.6 oz)   Height: 175.3 cm (69\")     Body mass index is 21.35 kg/m².    Physical Exam    Physical Exam  Vitals and nursing note reviewed.   Constitutional:       General: He is not in acute distress.     Appearance: Normal appearance. He is not ill-appearing, toxic-appearing or diaphoretic.   HENT:      Head: Normocephalic and atraumatic.   Cardiovascular:      Rate and Rhythm: Normal rate and regular rhythm.      Pulses: Normal pulses.      Heart sounds: Normal heart sounds. No murmur heard.    No friction rub. No gallop.   Pulmonary:      Effort: Pulmonary effort is normal. No respiratory distress.      Breath sounds: No stridor. Wheezing and rhonchi present. No rales.      Comments: Very coarse lung sounds. Expiratory wheezing with end air trapping noted.  Chest:      Chest wall: No tenderness.   Musculoskeletal:      Cervical back: Normal range of motion and neck supple.   Neurological:      Mental Status: He is alert.             Diagnoses and all orders for this visit:    1. Acute cough (Primary)  -     XR Chest PA & Lateral; Future    2. Wheezing  -     XR Chest PA & Lateral; Future    3. SOB (shortness of breath)  -     XR Chest PA & Lateral; Future    4. Acute exacerbation of chronic obstructive pulmonary disease (COPD) ([facility])  -     cefTRIAXone (ROCEPHIN) 350 mg/ml in lidocaine 1% IM syringe (1 gm vial)  -     triamcinolone acetonide (KENALOG-40) " injection 40 mg  -     predniSONE (DELTASONE) 20 MG tablet; Take 1 tablet by mouth 2 (Two) Times a Day for 5 days.  Dispense: 10 tablet; Refill: 0  -     azithromycin (Zithromax Z-Piter) 250 MG tablet; Take 2 tablets the first day, then 1 tablet daily for 4 days.  Dispense: 6 tablet; Refill: 0    5. Pleural effusion on left       Because of patient's symptoms and clinical assessment, patient was sent for a stat chest x-ray.  Chest x-ray reviewed a small pleural effusion on the left.  Also some possible atelectasis.  With emphysema.  This is an acute exacerbation of patient's COPD.  He was given Rocephin and Kenalog injections while in office today.  He tolerated well without complications.  He was given a scription for Zithromax and prednisone.  Patient advised how to take these medications and possible side effects.  He is advised not to start them until tomorrow as I have give him medicines in the office today.  He is advised to use his albuterol nebs every 4 hours as needed.  He is advised breathing exercises at least 4-5 times hourly.  He is advised if any acute worsening to go to the ER.  He is to follow-up in 1 week or sooner if needed for recheck.  Answered all questions.  Patient verbalized understanding of plan of care.          This document has been electronically signed by JASMINE Taylor on January 19, 2023 12:04 CST

## 2023-01-26 ENCOUNTER — OFFICE VISIT (OUTPATIENT)
Dept: FAMILY MEDICINE CLINIC | Facility: CLINIC | Age: 68
End: 2023-01-26
Payer: MEDICARE

## 2023-01-26 ENCOUNTER — TELEPHONE (OUTPATIENT)
Dept: FAMILY MEDICINE CLINIC | Facility: CLINIC | Age: 68
End: 2023-01-26

## 2023-01-26 VITALS
OXYGEN SATURATION: 100 % | TEMPERATURE: 97.3 F | HEIGHT: 69 IN | WEIGHT: 145 LBS | HEART RATE: 73 BPM | SYSTOLIC BLOOD PRESSURE: 156 MMHG | DIASTOLIC BLOOD PRESSURE: 70 MMHG | BODY MASS INDEX: 21.48 KG/M2

## 2023-01-26 DIAGNOSIS — J90 PLEURAL EFFUSION: ICD-10-CM

## 2023-01-26 DIAGNOSIS — J44.1 ACUTE EXACERBATION OF CHRONIC OBSTRUCTIVE PULMONARY DISEASE (COPD): Primary | ICD-10-CM

## 2023-01-26 PROCEDURE — 99214 OFFICE O/P EST MOD 30 MIN: CPT | Performed by: NURSE PRACTITIONER

## 2023-01-26 NOTE — TELEPHONE ENCOUNTER
Aurea Torres MA   1/26/2023  3:08 PM CST   The patients wife contacted the office, results were relayed per verbal release form. She stated understanding and thanked me.     Hermelinda Diaz, APRN   1/26/2023  9:28 AM CST   Chest x-ray is normal.  There is no significant pleural effusion or fluid noted. Please inform patient of results.

## 2023-01-26 NOTE — PROGRESS NOTES
CC: Follow-up (One Week Follow Up /Acute Cough /Wheezing /SOB/COPD)      Subjective:  Derrell Bynum is a 68 y.o. male who presents for         Patient presents to office for 1 week follow-up on acute exacerbation of COPD.  Patient was given a steroid injection and Rocephin injection while in office last week.  He was sent home on prednisone and Zithromax.  He has finished these medications.  He states he is feeling better.  His breathing is back to baseline. Denies any further wheezing. Cough is better. He continues to smoke despite smoking cessation counseling.  On x-ray last week, patient was noted to have a small left pleural effusion.  We will repeat x-ray patient today to make sure this is not gotten any larger.      The following portions of the patient's history were reviewed and updated as appropriate: allergies, current medications, past family history, past medical history, past social history, past surgical history and problem list.    Past Medical History:   Diagnosis Date   • Acute bacterial sinusitis    • Acute pharyngitis    • Acute sinusitis    • Allergic rhinitis    • Chronic obstructive pulmonary disease with acute lower respiratory infection (HCC)    • Chronic sinusitis    • Coalworker's pneumoconiosis (HCC)    • Contact dermatitis    • COPD (chronic obstructive pulmonary disease) (HCC)    • Cough    • Dyspnea    • Encounter for immunization    • Erythrocytosis    • Essential hypertension    • Fever    • Hemoptysis    • Hoarse    • Hyperlipidemia    • Insect bite, nonvenomous of shoulder and upper arm, infected    • Malaise and fatigue     Other   • Need for immunization against influenza    • Osteoarthritis     Chronic daily pain   • Smokes tobacco daily    • Tobacco dependence syndrome    • Viral gastroenteritis 10/10/2013    Resolved   • Wheezing          Current Outpatient Medications:   •  albuterol (PROVENTIL) (2.5 MG/3ML) 0.083% nebulizer solution, Take 2.5 mg by nebulization Every 4  (Four) Hours As Needed for wheezing (Neb tx q6hrs prn cough, congestion, wheezing)., Disp: , Rfl:   •  albuterol sulfate HFA (Ventolin HFA) 108 (90 Base) MCG/ACT inhaler, Inhale 2 puffs Every 6 (Six) Hours As Needed for Wheezing., Disp: 18 g, Rfl: 11  •  amLODIPine (NORVASC) 10 MG tablet, Take 1 tablet by mouth Daily., Disp: 90 tablet, Rfl: 1  •  Ascorbic Acid (VITAMIN C) 500 MG capsule, , Disp: , Rfl:   •  aspirin 81 MG chewable tablet, Chew 81 mg Daily., Disp: , Rfl:   •  CloNIDine (CATAPRES) 0.1 MG tablet, Take 1 tablet by mouth 2 (Two) Times a Day. If needed for B/P 150/90 or higher., Disp: 60 tablet, Rfl: 5  •  fluticasone (FLONASE) 50 MCG/ACT nasal spray, 1 spray into each nostril 2 (Two) Times a Day., Disp: , Rfl:   •  Fluticasone Furoate-Vilanterol (BREO ELLIPTA) 100-25 MCG/INH inhaler, Inhale 1 puff Daily., Disp: 1 each, Rfl: 5  •  HYDROcodone-acetaminophen (Lortab) 7.5-325 MG per tablet, Take 1 tablet by mouth Every 6 (Six) Hours As Needed for Moderate Pain. Fill when due, Disp: 120 tablet, Rfl: 0  •  lisinopril (PRINIVIL,ZESTRIL) 10 MG tablet, Take 1 tablet by mouth Daily., Disp: 30 tablet, Rfl: 5  •  metoprolol succinate XL (Toprol XL) 50 MG 24 hr tablet, Take 1 tablet by mouth Daily. Dose increased on 1/31/22. For high blood pressure, Disp: 90 tablet, Rfl: 1  •  montelukast (SINGULAIR) 10 MG tablet, Take 1 tablet by mouth Every Night., Disp: 90 tablet, Rfl: 1  •  ondansetron ODT (ZOFRAN-ODT) 4 MG disintegrating tablet, , Disp: , Rfl:   •  pantoprazole (PROTONIX) 40 MG EC tablet, Take 1 tablet by mouth Every Morning., Disp: 30 tablet, Rfl: 5  •  pravastatin (PRAVACHOL) 20 MG tablet, Take 1 tablet by mouth Daily., Disp: 90 tablet, Rfl: 1  •  tiZANidine (ZANAFLEX) 2 MG tablet, Take 1 tablet by mouth Every 8 (Eight) Hours As Needed for Muscle Spasms., Disp: 90 tablet, Rfl: 5    Review of Systems    Review of Systems   Constitutional: Negative.    HENT: Negative.    Eyes: Negative.    Respiratory: Positive  "for cough.    Cardiovascular: Negative.    Gastrointestinal: Negative.    Endocrine: Negative.    Genitourinary: Negative.    Musculoskeletal: Negative.    Skin: Negative.    Allergic/Immunologic: Negative.    Neurological: Negative.    Hematological: Negative.    Psychiatric/Behavioral: Negative.    All other systems reviewed and are negative.      Objective  Vitals:    01/26/23 0803   BP: 156/70   BP Location: Right arm   Patient Position: Sitting   Cuff Size: Large Adult   Pulse: 73   Temp: 97.3 °F (36.3 °C)   TempSrc: Tympanic   SpO2: 100%   Weight: 65.8 kg (145 lb)   Height: 175.3 cm (69.02\")     Body mass index is 21.4 kg/m².    Physical Exam    Physical Exam  Vitals and nursing note reviewed.   Constitutional:       General: He is not in acute distress.     Appearance: Normal appearance. He is not ill-appearing, toxic-appearing or diaphoretic.   HENT:      Head: Normocephalic and atraumatic.   Cardiovascular:      Rate and Rhythm: Normal rate and regular rhythm.      Pulses: Normal pulses.      Heart sounds: Normal heart sounds. No murmur heard.    No friction rub. No gallop.   Pulmonary:      Effort: Pulmonary effort is normal. No respiratory distress.      Breath sounds: No stridor. No wheezing, rhonchi or rales.      Comments: Breath sounds diminished yet clear.  Chest:      Chest wall: No tenderness.   Musculoskeletal:      Cervical back: Normal range of motion and neck supple.   Neurological:      Mental Status: He is alert.             Diagnoses and all orders for this visit:    1. Acute exacerbation of chronic obstructive pulmonary disease (COPD) ([facility]) (Primary)  Comments:  Improved  Orders:  -     XR Chest PA & Lateral; Future    2. Pleural effusion  -     XR Chest PA & Lateral; Future       Patient has improved from acute exacerbation of COPD.  His breathing is now at baseline.  Smoking cessation counseling of less than 3 minutes provided.  Patient is not interested in smoking cessation at this time. "  He is advised to continue his breathing medications as ordered.  We will repeat chest x-ray to make sure the pleural effusion has not gotten any larger.  We will notify patient of these results once they are available.  Patient advised to keep his scheduled appointment in July of this year.  Otherwise, to follow-up as needed.  Answered all questions.  Patient verbalized understanding of plan of care.          This document has been electronically signed by JASMINE Taylor on January 26, 2023 08:16 CST

## 2023-01-30 DIAGNOSIS — J30.89 CHRONIC NONSEASONAL ALLERGIC RHINITIS DUE TO POLLEN: ICD-10-CM

## 2023-01-30 RX ORDER — MONTELUKAST SODIUM 10 MG/1
10 TABLET ORAL NIGHTLY
Qty: 90 TABLET | Refills: 1 | Status: SHIPPED | OUTPATIENT
Start: 2023-01-30

## 2023-02-13 ENCOUNTER — CLINICAL SUPPORT (OUTPATIENT)
Dept: FAMILY MEDICINE CLINIC | Facility: CLINIC | Age: 68
End: 2023-02-13
Payer: MEDICARE

## 2023-02-13 DIAGNOSIS — Z23 NEED FOR SHINGLES VACCINE: Primary | ICD-10-CM

## 2023-02-13 PROCEDURE — 90471 IMMUNIZATION ADMIN: CPT | Performed by: NURSE PRACTITIONER

## 2023-02-28 DIAGNOSIS — I10 ESSENTIAL HYPERTENSION: Chronic | ICD-10-CM

## 2023-02-28 RX ORDER — METOPROLOL SUCCINATE 50 MG/1
50 TABLET, EXTENDED RELEASE ORAL DAILY
Qty: 90 TABLET | Refills: 1 | OUTPATIENT
Start: 2023-02-28

## 2023-02-28 RX ORDER — METOPROLOL SUCCINATE 50 MG/1
50 TABLET, EXTENDED RELEASE ORAL DAILY
Qty: 90 TABLET | Refills: 1 | Status: SHIPPED | OUTPATIENT
Start: 2023-02-28

## 2023-04-17 DIAGNOSIS — K21.00 GASTROESOPHAGEAL REFLUX DISEASE WITH ESOPHAGITIS WITHOUT HEMORRHAGE: ICD-10-CM

## 2023-04-17 DIAGNOSIS — I10 ESSENTIAL HYPERTENSION: ICD-10-CM

## 2023-04-17 RX ORDER — PANTOPRAZOLE SODIUM 40 MG/1
40 TABLET, DELAYED RELEASE ORAL EVERY MORNING
Qty: 30 TABLET | Refills: 0 | Status: SHIPPED | OUTPATIENT
Start: 2023-04-17

## 2023-04-17 RX ORDER — LISINOPRIL 10 MG/1
10 TABLET ORAL DAILY
Qty: 30 TABLET | Refills: 0 | Status: SHIPPED | OUTPATIENT
Start: 2023-04-17

## 2023-05-09 DIAGNOSIS — K21.00 GASTROESOPHAGEAL REFLUX DISEASE WITH ESOPHAGITIS WITHOUT HEMORRHAGE: ICD-10-CM

## 2023-05-09 DIAGNOSIS — I10 ESSENTIAL HYPERTENSION: ICD-10-CM

## 2023-05-10 RX ORDER — LISINOPRIL 10 MG/1
10 TABLET ORAL DAILY
Qty: 30 TABLET | Refills: 0 | OUTPATIENT
Start: 2023-05-10

## 2023-05-10 RX ORDER — PANTOPRAZOLE SODIUM 40 MG/1
40 TABLET, DELAYED RELEASE ORAL EVERY MORNING
Qty: 30 TABLET | Refills: 0 | OUTPATIENT
Start: 2023-05-10

## 2023-05-15 DIAGNOSIS — E78.2 MIXED HYPERLIPIDEMIA: ICD-10-CM

## 2023-05-15 RX ORDER — PRAVASTATIN SODIUM 20 MG
20 TABLET ORAL DAILY
Qty: 90 TABLET | Refills: 1 | Status: SHIPPED | OUTPATIENT
Start: 2023-05-15

## 2023-05-24 ENCOUNTER — EXTERNAL PBMM DATA (OUTPATIENT)
Dept: PHARMACY | Facility: OTHER | Age: 68
End: 2023-05-24
Payer: MEDICARE

## 2023-05-25 DIAGNOSIS — E78.2 MIXED HYPERLIPIDEMIA: ICD-10-CM

## 2023-05-30 RX ORDER — PRAVASTATIN SODIUM 20 MG
20 TABLET ORAL DAILY
Qty: 90 TABLET | Refills: 1 | OUTPATIENT
Start: 2023-05-30

## 2023-06-15 DIAGNOSIS — I10 ESSENTIAL HYPERTENSION: ICD-10-CM

## 2023-06-15 RX ORDER — LISINOPRIL 10 MG/1
10 TABLET ORAL DAILY
Qty: 30 TABLET | Refills: 5 | Status: SHIPPED | OUTPATIENT
Start: 2023-06-15

## 2023-06-15 RX ORDER — AMLODIPINE BESYLATE 10 MG/1
10 TABLET ORAL DAILY
Qty: 90 TABLET | Refills: 1 | Status: SHIPPED | OUTPATIENT
Start: 2023-06-15

## 2023-07-06 PROBLEM — E53.8 B12 DEFICIENCY: Chronic | Status: ACTIVE | Noted: 2023-07-06

## 2023-07-21 DIAGNOSIS — K21.00 GASTROESOPHAGEAL REFLUX DISEASE WITH ESOPHAGITIS WITHOUT HEMORRHAGE: ICD-10-CM

## 2023-07-24 ENCOUNTER — LAB (OUTPATIENT)
Dept: LAB | Facility: OTHER | Age: 68
End: 2023-07-24
Payer: MEDICARE

## 2023-07-24 DIAGNOSIS — E53.8 B12 DEFICIENCY: ICD-10-CM

## 2023-07-24 DIAGNOSIS — K21.9 GASTROESOPHAGEAL REFLUX DISEASE WITHOUT ESOPHAGITIS: ICD-10-CM

## 2023-07-24 DIAGNOSIS — I10 ESSENTIAL HYPERTENSION: ICD-10-CM

## 2023-07-24 DIAGNOSIS — E78.2 MIXED HYPERLIPIDEMIA: ICD-10-CM

## 2023-07-24 DIAGNOSIS — M47.26 OSTEOARTHRITIS OF SPINE WITH RADICULOPATHY, LUMBAR REGION: ICD-10-CM

## 2023-07-24 DIAGNOSIS — J44.9 CHRONIC OBSTRUCTIVE PULMONARY DISEASE, UNSPECIFIED COPD TYPE: ICD-10-CM

## 2023-07-24 DIAGNOSIS — Z87.891 PERSONAL HISTORY OF NICOTINE DEPENDENCE: ICD-10-CM

## 2023-07-24 DIAGNOSIS — R73.02 IMPAIRED GLUCOSE TOLERANCE: ICD-10-CM

## 2023-07-24 DIAGNOSIS — M51.36 DDD (DEGENERATIVE DISC DISEASE), LUMBAR: ICD-10-CM

## 2023-07-24 LAB
ALBUMIN SERPL-MCNC: 3.8 G/DL (ref 3.5–5)
ALBUMIN/GLOB SERPL: 1 G/DL (ref 1.1–1.8)
ALP SERPL-CCNC: 100 U/L (ref 38–126)
ALT SERPL W P-5'-P-CCNC: 29 U/L
ANION GAP SERPL CALCULATED.3IONS-SCNC: 7 MMOL/L (ref 5–15)
AST SERPL-CCNC: 57 U/L (ref 17–59)
BASOPHILS # BLD AUTO: 0.05 10*3/MM3 (ref 0–0.2)
BASOPHILS NFR BLD AUTO: 0.7 % (ref 0–1.5)
BILIRUB SERPL-MCNC: 0.6 MG/DL (ref 0.2–1.3)
BUN SERPL-MCNC: 7 MG/DL (ref 7–23)
BUN/CREAT SERPL: 9.7 (ref 7–25)
CALCIUM SPEC-SCNC: 8.8 MG/DL (ref 8.4–10.2)
CHLORIDE SERPL-SCNC: 94 MMOL/L (ref 101–112)
CHOLEST SERPL-MCNC: 158 MG/DL (ref 150–200)
CO2 SERPL-SCNC: 29 MMOL/L (ref 22–30)
CREAT SERPL-MCNC: 0.72 MG/DL (ref 0.7–1.3)
DEPRECATED RDW RBC AUTO: 50.4 FL (ref 37–54)
EGFRCR SERPLBLD CKD-EPI 2021: 100.8 ML/MIN/1.73
EOSINOPHIL # BLD AUTO: 0.21 10*3/MM3 (ref 0–0.4)
EOSINOPHIL NFR BLD AUTO: 2.8 % (ref 0.3–6.2)
ERYTHROCYTE [DISTWIDTH] IN BLOOD BY AUTOMATED COUNT: 16.5 % (ref 12.3–15.4)
GLOBULIN UR ELPH-MCNC: 3.7 GM/DL (ref 2.3–3.5)
GLUCOSE SERPL-MCNC: 100 MG/DL (ref 70–99)
HCT VFR BLD AUTO: 40.1 % (ref 37.5–51)
HDLC SERPL-MCNC: 102 MG/DL (ref 40–59)
HGB BLD-MCNC: 13.5 G/DL (ref 13–17.7)
LDLC SERPL CALC-MCNC: 43 MG/DL
LDLC/HDLC SERPL: 0.41 {RATIO} (ref 0–3.55)
LYMPHOCYTES # BLD AUTO: 1.22 10*3/MM3 (ref 0.7–3.1)
LYMPHOCYTES NFR BLD AUTO: 16.2 % (ref 19.6–45.3)
MCH RBC QN AUTO: 28.3 PG (ref 26.6–33)
MCHC RBC AUTO-ENTMCNC: 33.7 G/DL (ref 31.5–35.7)
MCV RBC AUTO: 84.1 FL (ref 79–97)
MONOCYTES # BLD AUTO: 1.21 10*3/MM3 (ref 0.1–0.9)
MONOCYTES NFR BLD AUTO: 16 % (ref 5–12)
NEUTROPHILS NFR BLD AUTO: 4.86 10*3/MM3 (ref 1.7–7)
NEUTROPHILS NFR BLD AUTO: 64.3 % (ref 42.7–76)
PLATELET # BLD AUTO: 354 10*3/MM3 (ref 140–450)
PMV BLD AUTO: 8.9 FL (ref 6–12)
POTASSIUM SERPL-SCNC: 5 MMOL/L (ref 3.4–5)
PROT SERPL-MCNC: 7.5 G/DL (ref 6.3–8.6)
RBC # BLD AUTO: 4.77 10*6/MM3 (ref 4.14–5.8)
SODIUM SERPL-SCNC: 130 MMOL/L (ref 137–145)
TRIGL SERPL-MCNC: 69 MG/DL
VIT B12 BLD-MCNC: 391 PG/ML (ref 211–946)
VLDLC SERPL-MCNC: 13 MG/DL (ref 5–40)
WBC NRBC COR # BLD: 7.55 10*3/MM3 (ref 3.4–10.8)

## 2023-07-24 PROCEDURE — 80061 LIPID PANEL: CPT | Performed by: NURSE PRACTITIONER

## 2023-07-24 PROCEDURE — 83036 HEMOGLOBIN GLYCOSYLATED A1C: CPT | Performed by: NURSE PRACTITIONER

## 2023-07-24 PROCEDURE — 36415 COLL VENOUS BLD VENIPUNCTURE: CPT | Performed by: NURSE PRACTITIONER

## 2023-07-24 PROCEDURE — 80053 COMPREHEN METABOLIC PANEL: CPT | Performed by: NURSE PRACTITIONER

## 2023-07-24 PROCEDURE — 82607 VITAMIN B-12: CPT | Performed by: NURSE PRACTITIONER

## 2023-07-24 PROCEDURE — 85025 COMPLETE CBC W/AUTO DIFF WBC: CPT | Performed by: NURSE PRACTITIONER

## 2023-07-24 PROCEDURE — 84443 ASSAY THYROID STIM HORMONE: CPT | Performed by: NURSE PRACTITIONER

## 2023-07-24 RX ORDER — PANTOPRAZOLE SODIUM 40 MG/1
40 TABLET, DELAYED RELEASE ORAL EVERY MORNING
Qty: 30 TABLET | Refills: 5 | Status: SHIPPED | OUTPATIENT
Start: 2023-07-24

## 2023-08-23 DIAGNOSIS — E78.2 MIXED HYPERLIPIDEMIA: ICD-10-CM

## 2023-08-23 RX ORDER — PRAVASTATIN SODIUM 20 MG
20 TABLET ORAL DAILY
Qty: 90 TABLET | Refills: 1 | Status: SHIPPED | OUTPATIENT
Start: 2023-08-23

## 2023-08-31 DIAGNOSIS — I10 ESSENTIAL HYPERTENSION: Chronic | ICD-10-CM

## 2023-08-31 RX ORDER — METOPROLOL SUCCINATE 50 MG/1
50 TABLET, EXTENDED RELEASE ORAL DAILY
Qty: 90 TABLET | Refills: 1 | Status: SHIPPED | OUTPATIENT
Start: 2023-08-31

## 2023-09-17 DIAGNOSIS — J30.89 CHRONIC NONSEASONAL ALLERGIC RHINITIS DUE TO POLLEN: ICD-10-CM

## 2023-09-18 RX ORDER — MONTELUKAST SODIUM 10 MG/1
10 TABLET ORAL NIGHTLY
Qty: 90 TABLET | Refills: 1 | Status: SHIPPED | OUTPATIENT
Start: 2023-09-18

## 2023-09-25 DIAGNOSIS — I10 ESSENTIAL HYPERTENSION: ICD-10-CM

## 2023-09-25 RX ORDER — LISINOPRIL 10 MG/1
10 TABLET ORAL DAILY
Qty: 90 TABLET | Refills: 5 | Status: SHIPPED | OUTPATIENT
Start: 2023-09-25

## 2023-09-25 RX ORDER — AMLODIPINE BESYLATE 10 MG/1
10 TABLET ORAL DAILY
Qty: 90 TABLET | Refills: 1 | Status: SHIPPED | OUTPATIENT
Start: 2023-09-25

## (undated) DEVICE — CANN SMPL SOFTECH BIFLO ETCO2 A/M 7FT

## (undated) DEVICE — BITEBLOCK ENDO W/STRAP 60F A/ LF DISP

## (undated) DEVICE — SINGLE-USE BIOPSY FORCEPS: Brand: RADIAL JAW 4